# Patient Record
Sex: MALE | Race: WHITE
[De-identification: names, ages, dates, MRNs, and addresses within clinical notes are randomized per-mention and may not be internally consistent; named-entity substitution may affect disease eponyms.]

---

## 2018-02-13 ENCOUNTER — HOSPITAL ENCOUNTER (OUTPATIENT)
Dept: HOSPITAL 47 - LABWHC1 | Age: 58
Discharge: HOME | End: 2018-02-13
Payer: COMMERCIAL

## 2018-02-13 ENCOUNTER — HOSPITAL ENCOUNTER (INPATIENT)
Dept: HOSPITAL 47 - EC | Age: 58
LOS: 3 days | Discharge: HOME | DRG: 378 | End: 2018-02-16
Payer: COMMERCIAL

## 2018-02-13 DIAGNOSIS — K62.5: Primary | ICD-10-CM

## 2018-02-13 DIAGNOSIS — K57.31: ICD-10-CM

## 2018-02-13 DIAGNOSIS — I25.2: ICD-10-CM

## 2018-02-13 DIAGNOSIS — D62: ICD-10-CM

## 2018-02-13 DIAGNOSIS — Z87.11: ICD-10-CM

## 2018-02-13 DIAGNOSIS — Z79.82: ICD-10-CM

## 2018-02-13 DIAGNOSIS — K92.2: Primary | ICD-10-CM

## 2018-02-13 DIAGNOSIS — F10.20: ICD-10-CM

## 2018-02-13 DIAGNOSIS — Z85.828: ICD-10-CM

## 2018-02-13 DIAGNOSIS — Z79.02: ICD-10-CM

## 2018-02-13 DIAGNOSIS — I25.10: ICD-10-CM

## 2018-02-13 DIAGNOSIS — Z87.891: ICD-10-CM

## 2018-02-13 DIAGNOSIS — Z87.442: ICD-10-CM

## 2018-02-13 DIAGNOSIS — D75.89: ICD-10-CM

## 2018-02-13 DIAGNOSIS — Z95.5: ICD-10-CM

## 2018-02-13 DIAGNOSIS — E78.5: ICD-10-CM

## 2018-02-13 DIAGNOSIS — K21.0: ICD-10-CM

## 2018-02-13 DIAGNOSIS — Z79.899: ICD-10-CM

## 2018-02-13 LAB
ALBUMIN SERPL-MCNC: 3.4 G/DL (ref 3.5–5)
ALP SERPL-CCNC: 58 U/L (ref 38–126)
ALT SERPL-CCNC: 32 U/L (ref 21–72)
ANION GAP SERPL CALC-SCNC: 10 MMOL/L
APTT BLD: 20.5 SEC (ref 22–30)
AST SERPL-CCNC: 25 U/L (ref 17–59)
BASOPHILS # BLD AUTO: 0.1 K/UL (ref 0–0.2)
BASOPHILS # BLD AUTO: 0.1 K/UL (ref 0–0.2)
BASOPHILS NFR BLD AUTO: 1 %
BASOPHILS NFR BLD AUTO: 1 %
BUN SERPL-SCNC: 13 MG/DL (ref 9–20)
CALCIUM SPEC-MCNC: 9.2 MG/DL (ref 8.4–10.2)
CHLORIDE SERPL-SCNC: 103 MMOL/L (ref 98–107)
CK SERPL-CCNC: 59 U/L (ref 55–170)
CO2 SERPL-SCNC: 25 MMOL/L (ref 22–30)
EOSINOPHIL # BLD AUTO: 0.2 K/UL (ref 0–0.7)
EOSINOPHIL # BLD AUTO: 0.3 K/UL (ref 0–0.7)
EOSINOPHIL NFR BLD AUTO: 2 %
EOSINOPHIL NFR BLD AUTO: 3 %
ERYTHROCYTE [DISTWIDTH] IN BLOOD BY AUTOMATED COUNT: 2.7 M/UL (ref 4.3–5.9)
ERYTHROCYTE [DISTWIDTH] IN BLOOD BY AUTOMATED COUNT: 2.75 M/UL (ref 4.3–5.9)
ERYTHROCYTE [DISTWIDTH] IN BLOOD: 13.2 % (ref 11.5–15.5)
ERYTHROCYTE [DISTWIDTH] IN BLOOD: 13.3 % (ref 11.5–15.5)
GLUCOSE BLD-MCNC: 114 MG/DL (ref 75–99)
GLUCOSE SERPL-MCNC: 126 MG/DL (ref 74–99)
HCT VFR BLD AUTO: 26.3 % (ref 39–53)
HCT VFR BLD AUTO: 26.9 % (ref 39–53)
HGB BLD-MCNC: 8.4 GM/DL (ref 13–17.5)
HGB BLD-MCNC: 8.5 GM/DL (ref 13–17.5)
INR PPP: 1 (ref ?–1.2)
LIPASE SERPL-CCNC: 95 U/L (ref 23–300)
LYMPHOCYTES # SPEC AUTO: 1.2 K/UL (ref 1–4.8)
LYMPHOCYTES # SPEC AUTO: 1.9 K/UL (ref 1–4.8)
LYMPHOCYTES NFR SPEC AUTO: 18 %
LYMPHOCYTES NFR SPEC AUTO: 21 %
MAGNESIUM SPEC-SCNC: 1.9 MG/DL (ref 1.6–2.3)
MCH RBC QN AUTO: 31 PG (ref 25–35)
MCH RBC QN AUTO: 31.1 PG (ref 25–35)
MCHC RBC AUTO-ENTMCNC: 31.7 G/DL (ref 31–37)
MCHC RBC AUTO-ENTMCNC: 32 G/DL (ref 31–37)
MCV RBC AUTO: 97.4 FL (ref 80–100)
MCV RBC AUTO: 98 FL (ref 80–100)
MONOCYTES # BLD AUTO: 0.4 K/UL (ref 0–1)
MONOCYTES # BLD AUTO: 0.6 K/UL (ref 0–1)
MONOCYTES NFR BLD AUTO: 6 %
MONOCYTES NFR BLD AUTO: 6 %
NEUTROPHILS # BLD AUTO: 4.7 K/UL (ref 1.3–7.7)
NEUTROPHILS # BLD AUTO: 5.8 K/UL (ref 1.3–7.7)
NEUTROPHILS NFR BLD AUTO: 66 %
NEUTROPHILS NFR BLD AUTO: 70 %
PLATELET # BLD AUTO: 350 K/UL (ref 150–450)
PLATELET # BLD AUTO: 380 K/UL (ref 150–450)
POTASSIUM SERPL-SCNC: 4.3 MMOL/L (ref 3.5–5.1)
PROT SERPL-MCNC: 6.2 G/DL (ref 6.3–8.2)
PT BLD: 9.8 SEC (ref 9–12)
SODIUM SERPL-SCNC: 138 MMOL/L (ref 137–145)
TROPONIN I SERPL-MCNC: <0.012 NG/ML (ref 0–0.03)
WBC # BLD AUTO: 6.7 K/UL (ref 3.8–10.6)
WBC # BLD AUTO: 8.8 K/UL (ref 3.8–10.6)

## 2018-02-13 PROCEDURE — 85025 COMPLETE CBC W/AUTO DIFF WBC: CPT

## 2018-02-13 PROCEDURE — 82550 ASSAY OF CK (CPK): CPT

## 2018-02-13 PROCEDURE — 86920 COMPATIBILITY TEST SPIN: CPT

## 2018-02-13 PROCEDURE — 84100 ASSAY OF PHOSPHORUS: CPT

## 2018-02-13 PROCEDURE — 84132 ASSAY OF SERUM POTASSIUM: CPT

## 2018-02-13 PROCEDURE — 85610 PROTHROMBIN TIME: CPT

## 2018-02-13 PROCEDURE — 85027 COMPLETE CBC AUTOMATED: CPT

## 2018-02-13 PROCEDURE — 96361 HYDRATE IV INFUSION ADD-ON: CPT

## 2018-02-13 PROCEDURE — 86901 BLOOD TYPING SEROLOGIC RH(D): CPT

## 2018-02-13 PROCEDURE — 85730 THROMBOPLASTIN TIME PARTIAL: CPT

## 2018-02-13 PROCEDURE — 43235 EGD DIAGNOSTIC BRUSH WASH: CPT

## 2018-02-13 PROCEDURE — 83735 ASSAY OF MAGNESIUM: CPT

## 2018-02-13 PROCEDURE — 93005 ELECTROCARDIOGRAM TRACING: CPT

## 2018-02-13 PROCEDURE — 86900 BLOOD TYPING SEROLOGIC ABO: CPT

## 2018-02-13 PROCEDURE — 36415 COLL VENOUS BLD VENIPUNCTURE: CPT

## 2018-02-13 PROCEDURE — 80048 BASIC METABOLIC PNL TOTAL CA: CPT

## 2018-02-13 PROCEDURE — 86850 RBC ANTIBODY SCREEN: CPT

## 2018-02-13 PROCEDURE — 96374 THER/PROPH/DIAG INJ IV PUSH: CPT

## 2018-02-13 PROCEDURE — 82553 CREATINE MB FRACTION: CPT

## 2018-02-13 PROCEDURE — 80053 COMPREHEN METABOLIC PANEL: CPT

## 2018-02-13 PROCEDURE — 45330 DIAGNOSTIC SIGMOIDOSCOPY: CPT

## 2018-02-13 PROCEDURE — 99285 EMERGENCY DEPT VISIT HI MDM: CPT

## 2018-02-13 PROCEDURE — 83690 ASSAY OF LIPASE: CPT

## 2018-02-13 PROCEDURE — 84484 ASSAY OF TROPONIN QUANT: CPT

## 2018-02-13 RX ADMIN — PANTOPRAZOLE SODIUM SCH MG: 40 INJECTION, POWDER, FOR SOLUTION INTRAVENOUS at 21:47

## 2018-02-13 RX ADMIN — CEFAZOLIN SCH MLS/HR: 330 INJECTION, POWDER, FOR SOLUTION INTRAMUSCULAR; INTRAVENOUS at 22:30

## 2018-02-13 NOTE — ED
General Adult HPI





- General


Chief complaint: GI Bleed


Stated complaint: Rctal bleeding


Time Seen by Provider: 02/13/18 10:21


Source: patient, RN notes reviewed, old records reviewed


Mode of arrival: ambulatory


Limitations: no limitations





- History of Present Illness


Initial comments: 





This is a 57-year-old male the ER for evaluation of GI bleed.  Patient sent in 

for abnormal outpatient lab test.  Patient did have recent heart attack, and 

Plavix.  Patient has no lightheadedness no dizziness no weakness to feels 

easily pass out no abdominal pain.  Patient's been having bloody bowel 

movements for 4 days.  Bloody bowel movement or bright red in nature 

occasionally a little dark.





- Related Data


 Home Medications











 Medication  Instructions  Recorded  Confirmed


 


Aspirin 81 mg PO QAM 10/23/17 02/13/18


 


Cholecalciferol (Vitamin D3) 2,000 unit PO QAM 10/23/17 02/13/18





[Vitamin D3]   


 


Atorvastatin [Lipitor] 80 mg PO QAM 12/08/17 02/13/18


 


Metoprolol Tartrate [Lopressor] 25 mg PO QAM 12/08/17 02/13/18


 


Clopidogrel [Plavix] 75 mg PO QAM 02/13/18 02/13/18








 Previous Rx's











 Medication  Instructions  Recorded


 


Nitroglycerin Sl Tabs [Nitrostat] 0.4 mg SUBLINGUAL Q5M PRN #25 tab 10/25/17











 Allergies











Allergy/AdvReac Type Severity Reaction Status Date / Time


 


No Known Allergies Allergy   Verified 02/13/18 10:36














Review of Systems


ROS Statement: 


Those systems with pertinent positive or pertinent negative responses have been 

documented in the HPI.





ROS Other: All systems not noted in ROS Statement are negative.





Past Medical History


Past Medical History: Cancer, Myocardial Infarction (MI)


Additional Past Medical History / Comment(s): skin cancer right eyebrow


Last Myocardial Infarction Date:: 10/23/17


History of Any Multi-Drug Resistant Organisms: None Reported


Past Surgical History: Heart Catheterization With Stent, Tonsillectomy


Additional Past Surgical History / Comment(s): skin cancer removal, 3 stents  10

/23/17


Past Anesthesia/Blood Transfusion Reactions: No Reported Reaction


Date of Last Stent Placement:: 10/23/17


Past Psychological History: No Psychological Hx Reported


Smoking Status: Former smoker


Past Alcohol Use History: Daily


Past Drug Use History: None Reported





- Past Family History


  ** Mother


Family Medical History: CVA/TIA





  ** Father


Family Medical History: Cancer


Additional Family Medical History / Comment(s): pancreatic





General Exam


Limitations: no limitations


General appearance: alert, in no apparent distress


Head exam: Present: atraumatic, normocephalic, normal inspection


Eye exam: Present: normal appearance, PERRL, EOMI.  Absent: scleral icterus, 

conjunctival injection, periorbital swelling


ENT exam: Present: normal exam, mucous membranes moist


Neck exam: Present: normal inspection.  Absent: tenderness, meningismus, 

lymphadenopathy


Respiratory exam: Present: normal lung sounds bilaterally.  Absent: respiratory 

distress, wheezes, rales, rhonchi, stridor


Cardiovascular Exam: Present: normal rhythm, tachycardia, normal heart sounds.  

Absent: systolic murmur, diastolic murmur, rubs, gallop, clicks


GI/Abdominal exam: Present: soft, normal bowel sounds.  Absent: distended, 

tenderness, guarding, rebound, rigid


Extremities exam: Present: normal inspection, full ROM, normal capillary 

refill.  Absent: tenderness, pedal edema, joint swelling, calf tenderness


Back exam: Present: normal inspection


Neurological exam: Present: alert, oriented X3, CN II-XII intact


Psychiatric exam: Present: normal affect, normal mood


Skin exam: Present: warm, dry, intact, normal color.  Absent: rash





Course


 Vital Signs











  02/13/18 02/13/18





  10:12 11:25


 


Temperature 98.8 F 


 


Pulse Rate 114 H 89


 


Respiratory 20 16





Rate  


 


Blood Pressure 111/72 136/73


 


O2 Sat by Pulse 100 100





Oximetry  














- Reevaluation(s)


Reevaluation #1: 





02/13/18 11:58


Patient has not had a significant bloody bowel movement here in the emergency 

room





Medical Decision Making





- Medical Decision Making





57 male the ER for evaluation of positive GI bleed.  3-4 days of bloody bowel 

movements.  Patient denies pain.  Patient's hemoglobin is drop from 14 down to 

8.  Patient will be admitted for continued evaluation of hemodynamic state





- Lab Data


Result diagrams: 


 02/13/18 10:50





 02/13/18 10:50


 Lab Results











  02/13/18 02/13/18 02/13/18 Range/Units





  10:50 10:50 10:50 


 


WBC    6.7  (3.8-10.6)  k/uL


 


RBC    2.70 L  (4.30-5.90)  m/uL


 


Hgb    8.4 L  (13.0-17.5)  gm/dL


 


Hct    26.3 L  (39.0-53.0)  %


 


MCV    97.4  (80.0-100.0)  fL


 


MCH    31.1  (25.0-35.0)  pg


 


MCHC    32.0  (31.0-37.0)  g/dL


 


RDW    13.3  (11.5-15.5)  %


 


Plt Count    350  (150-450)  k/uL


 


Neutrophils %    70  %


 


Lymphocytes %    18  %


 


Monocytes %    6  %


 


Eosinophils %    2  %


 


Basophils %    1  %


 


Neutrophils #    4.7  (1.3-7.7)  k/uL


 


Lymphocytes #    1.2  (1.0-4.8)  k/uL


 


Monocytes #    0.4  (0-1.0)  k/uL


 


Eosinophils #    0.2  (0-0.7)  k/uL


 


Basophils #    0.1  (0-0.2)  k/uL


 


PT  9.8    (9.0-12.0)  sec


 


INR  1.0    (<1.2)  


 


APTT  20.5 L    (22.0-30.0)  sec


 


Sodium     (137-145)  mmol/L


 


Potassium     (3.5-5.1)  mmol/L


 


Chloride     ()  mmol/L


 


Carbon Dioxide     (22-30)  mmol/L


 


Anion Gap     mmol/L


 


BUN     (9-20)  mg/dL


 


Creatinine     (0.66-1.25)  mg/dL


 


Est GFR (MDRD) Af Amer     (>60 ml/min/1.73 sqM)  


 


Est GFR (MDRD) Non-Af     (>60 ml/min/1.73 sqM)  


 


Glucose     (74-99)  mg/dL


 


Calcium     (8.4-10.2)  mg/dL


 


Magnesium     (1.6-2.3)  mg/dL


 


Total Bilirubin     (0.2-1.3)  mg/dL


 


AST     (17-59)  U/L


 


ALT     (21-72)  U/L


 


Alkaline Phosphatase     ()  U/L


 


Total Creatine Kinase   59   ()  U/L


 


CK-MB (CK-2)   0.4   (0.0-2.4)  ng/mL


 


CK-MB (CK-2) Rel Index   0.7   


 


Troponin I   <0.012   (0.000-0.034)  ng/mL


 


Total Protein     (6.3-8.2)  g/dL


 


Albumin     (3.5-5.0)  g/dL


 


Lipase     ()  U/L














  02/13/18 Range/Units





  10:50 


 


WBC   (3.8-10.6)  k/uL


 


RBC   (4.30-5.90)  m/uL


 


Hgb   (13.0-17.5)  gm/dL


 


Hct   (39.0-53.0)  %


 


MCV   (80.0-100.0)  fL


 


MCH   (25.0-35.0)  pg


 


MCHC   (31.0-37.0)  g/dL


 


RDW   (11.5-15.5)  %


 


Plt Count   (150-450)  k/uL


 


Neutrophils %   %


 


Lymphocytes %   %


 


Monocytes %   %


 


Eosinophils %   %


 


Basophils %   %


 


Neutrophils #   (1.3-7.7)  k/uL


 


Lymphocytes #   (1.0-4.8)  k/uL


 


Monocytes #   (0-1.0)  k/uL


 


Eosinophils #   (0-0.7)  k/uL


 


Basophils #   (0-0.2)  k/uL


 


PT   (9.0-12.0)  sec


 


INR   (<1.2)  


 


APTT   (22.0-30.0)  sec


 


Sodium  138  (137-145)  mmol/L


 


Potassium  4.3  (3.5-5.1)  mmol/L


 


Chloride  103  ()  mmol/L


 


Carbon Dioxide  25  (22-30)  mmol/L


 


Anion Gap  10  mmol/L


 


BUN  13  (9-20)  mg/dL


 


Creatinine  0.98  (0.66-1.25)  mg/dL


 


Est GFR (MDRD) Af Amer  >60  (>60 ml/min/1.73 sqM)  


 


Est GFR (MDRD) Non-Af  >60  (>60 ml/min/1.73 sqM)  


 


Glucose  126 H  (74-99)  mg/dL


 


Calcium  9.2  (8.4-10.2)  mg/dL


 


Magnesium  1.9  (1.6-2.3)  mg/dL


 


Total Bilirubin  0.3  (0.2-1.3)  mg/dL


 


AST  25  (17-59)  U/L


 


ALT  32  (21-72)  U/L


 


Alkaline Phosphatase  58  ()  U/L


 


Total Creatine Kinase   ()  U/L


 


CK-MB (CK-2)   (0.0-2.4)  ng/mL


 


CK-MB (CK-2) Rel Index   


 


Troponin I   (0.000-0.034)  ng/mL


 


Total Protein  6.2 L  (6.3-8.2)  g/dL


 


Albumin  3.4 L  (3.5-5.0)  g/dL


 


Lipase  95  ()  U/L














Disposition


Clinical Impression: 


 Gastrointestinal hemorrhage, Anemia





Disposition: ADMITTED AS IP TO THIS HOSP


Condition: Fair


Referrals: 


Reddy Chung MD [Primary Care Provider] - 1-2 days

## 2018-02-13 NOTE — P.CNPUL
History of Present Illness


Consult date: 02/13/18


Requesting physician: Darrius Martínez


Reason for consult: other (Critical care management)


Chief complaint: Bright red and dark tarry stools


History of present illness: 





This is a very pleasant 57-year-old gentleman who follows with Dr. Reddy Chung 

as his primary care physician.  He has a history of skin cancer with resection 

over the right eyebrow, nephrolithiasis, hyperlipidemia.  He has a remote 

history of smoking and daily alcohol consumption.  He has a history of coronary 

artery disease and had sustained an acute myocardial infarction in October 2017.  He had undergone cardiac catheterization and stenting 3 to the RCA.  

There was moderate disease in the circumflex at that time along with some 

disease in the LAD.  He had undergone a subsequent cardiac catheterization on 12 /12/2017 to evaluate the progression.  The stents were widely patent.  The LAD 

artery had diffuse distal disease in the distal one fourth.  Circumcised had a 

50% mid lesion.  He was recommended continued medical therapy.  He has remained 

on Plavix and aspirin since October.  He presented here to the emergency room 

from his primary care physician's office after having a 3 day history of bloody 

bowel movements.  Initially they were bright red and subsequently they're black 

and tarry.  He was found to have a drop in hemoglobin from 14-8.4.  The 

recommendation is for transfer to the intensive care unit where consulted for 

the same.  He is seen in the emergency room.  He is awake and alert in no acute 

distress.  He has not had any further bloody bowel movements today.  He is 

maintaining good O2 saturations up to 100% on room air.  He's been afebrile.  

Hemodynamically stable.  He has been initiated on Protonix 40 mg IV push twice 

a day.  0.9 normal saline at 100 MLS per hour following 1 L of fluid 

resuscitation.





Review of Systems





14 point review of system was conducted.  Positive for acute gastrointestinal 

bleeding.  All other systems are negative thus far.





Past Medical History


Past Medical History: Coronary Artery Disease (CAD), Cancer, Chest Pain / Angina

, Myocardial Infarction (MI)


Additional Past Medical History / Comment(s): 10/23/17 stemi,  skin cancer 

removal-right eyebrow, kidney stone which pt passed on his own.


Last Myocardial Infarction Date:: 10/23/17


History of Any Multi-Drug Resistant Organisms: None Reported


Past Surgical History: Heart Catheterization With Stent, Tonsillectomy


Additional Past Surgical History / Comment(s): skin cancer removal, 3 stents on 

10/23/17, 12/12/17 cardiac cath with maximizing medical therapy.


Past Anesthesia/Blood Transfusion Reactions: No Reported Reaction


Date of Last Stent Placement:: 10/23/17


Smoking Status: Former smoker





- Past Family History


  ** Mother


Family Medical History: CVA/TIA





  ** Father


Family Medical History: Cancer


Additional Family Medical History / Comment(s): pancreatic





Medications and Allergies


 Home Medications











 Medication  Instructions  Recorded  Confirmed  Type


 


Aspirin 81 mg PO QAM 10/23/17 02/13/18 History


 


Cholecalciferol (Vitamin D3) 2,000 unit PO QAM 10/23/17 02/13/18 History





[Vitamin D3]    


 


Nitroglycerin Sl Tabs [Nitrostat] 0.4 mg SUBLINGUAL Q5M PRN #25 tab 10/25/17 02/

13/18 Rx


 


Atorvastatin [Lipitor] 80 mg PO QAM 12/08/17 02/13/18 History


 


Metoprolol Tartrate [Lopressor] 25 mg PO QAM 12/08/17 02/13/18 History


 


Clopidogrel [Plavix] 75 mg PO QAM 02/13/18 02/13/18 History











 Allergies











Allergy/AdvReac Type Severity Reaction Status Date / Time


 


No Known Allergies Allergy   Verified 02/13/18 10:36














Physical Exam


Vitals: 


 Vital Signs











  Temp Pulse Resp BP Pulse Ox


 


 02/13/18 11:25   89  16  136/73  100


 


 02/13/18 10:12  98.8 F  114 H  20  111/72  100








 Intake and Output











 02/12/18 02/13/18 02/13/18





 22:59 06:59 14:59


 


Other:   


 


  Weight   76.657 kg








 Patient Weight











 02/14/18





 06:59


 


Weight 76.657 kg














GENERAL EXAM: Alert, active, comfortable in no apparent distress.


HEAD: Normocephalic.


EYES: Normal reaction of pupils, equal size.


NOSE: Clear with pink turbinates.


THROAT: No erythema or exudates.


NECK: No masses, no JVD.


CHEST: No chest wall deformity.


LUNGS: Equal air entry with no crackles, wheeze, rhonchi or dullness.


CVS: S1 and S2 normal with no audible murmur, regular rhythm.


ABDOMEN: No hepatosplenomegaly, normal bowel sounds, no guarding or rigidity.


SPINE: No scoliosis or deformity


SKIN: No rashes


CENTRAL NERVOUS SYSTEM: No focal deficits, tone is normal in all 4 extremities.


EXTREMITIES: There is no peripheral edema.  No clubbing, no cyanosis.  

Peripheral pulses are intact.





Results





- Laboratory Findings


CBC and BMP: 


 02/13/18 10:50





 02/13/18 10:50


PT/INR, D-dimer











PT  9.8 sec (9.0-12.0)   02/13/18  10:50    


 


INR  1.0  (<1.2)   02/13/18  10:50    








Abnormal lab findings: 


 Abnormal Labs











  02/13/18 02/13/18 02/13/18





  10:50 10:50 10:50


 


RBC   2.70 L 


 


Hgb   8.4 L 


 


Hct   26.3 L 


 


APTT  20.5 L  


 


Glucose    126 H


 


Total Protein    6.2 L


 


Albumin    3.4 L














Assessment and Plan


Assessment: 





Impression:





#1 Acute gastrointestinal bleeding with a drop of hemoglobin from 14-8.7.





#2 Coronary artery disease with myocardial infarction in October 2017 status 

post stent placements 3 to the RCA. Maintained on aspirin and Plavix.  

Subsequent cardiac catheterization in December 2017 revealed patent stents.





#3 Daily alcohol consumption.





#4 Remote history of chronic tobacco dependence.





#5 History of nephrolithiasis.





#6 History of skin cancer removed from the right eyebrow.





Plan:





The patient was seen and evaluated by Dr. Bhatt.  He will be transferred to the 

intensive care unit for close observation and continued hemoglobin monitoring.  

The patient has not had any active bleeding today.  His stents are less than 6 

months out.  We'll continue with Plavix and aspirin for now.  Continue 0.9 

normal saline at 100 MLS per hour.  Continue IV Protonix.  Consult 

gastroenterology.  We will continue to follow and make further recommendations 

based on his clinical status.





I, the cosigning physician, performed a history & physical examination of the 

patient. Lungs sounds are clear.  Maintaining good O2 saturations in the 90s on 

room air.  I discussed the assessment and plan of care with my nurse 

practitioner, Fang Soto. I attest to the above note as dictated by her. 


Time with Patient: Greater than 30

## 2018-02-13 NOTE — HP
HISTORY AND PHYSICAL



DATE OF ADMISSION:

02/13/2018



PRESENT COMPLAINT:

Lower GI bleed.



HISTORY OF PRESENTING COMPLAINT:

This is a pleasant 57-year-old patient of Dr. Chung.  The patient on October 23 did

have an ST-elevation myocardial infarction, had a stent placed to the RCA.  Then

ejection fraction was 40%.  The patient came back to the cath lab in December, by Dr. KIKA HUBBARD. Juvenal was found to have diffuse LAD and no intervention was done.  The decision

was made to do medical management.  Repeat echocardiogram showed an EF of 50%-55%.  The

patient has been on aspirin and Plavix.  Four days ago, patient had a bout of diarrhea

and then started having multiple bloody stools for the last 3 days.  Last bloody stool

was yesterday and patient has been feeling dizzy, tired, run down.  Hemoglobin in the

ER was found to be 8.4, dizzy, lightheaded.  Patient admitted for admitted for the

same.  No bleeding today.



REVIEW OF SYSTEMS:

CONSTITUTIONAL:  Tired.

HEENT:  None.

RESPIRATORY:  None.

CARDIOVASCULAR:  None.

GASTROINTESTINAL:  As above.

GENITOURINARY:  None.

MUSCULOSKELETAL:  None.

DERMATOLOGICAL:  None.

HEMATOLOGIC:  As above.

LYMPHATIC:  None.

PSYCHIATRY:  None.

NEUROLOGICAL:  None.



PAST MEDICAL HISTORY:

Kidney stones, skin cancer, _____, coronary artery disease with acute MI.



SURGICAL HISTORY:

Skin cancer removal.



SOCIAL HISTORY:

Patient smoked for 30 years, 2 packs a day.  Still drinks about 5-6 beers a day.

Retired .  Lives with his wife, Sivan.



Family history of stroke.



HOME MEDICATIONS:

1. Nitrostat 0.4 sublingual q.5 p.r.n.

2. Lopressor 25 p.o. daily.

3. Plavix 75 p.o. daily.

4. Vitamin D3 2000 p.o. daily.

5. Lipitor 80 mg p.o. daily.

6. Aspirin 81 mg p.o. daily.



ALLERGIES:

None.



EXAMINATION:

Temperature 98.4, pulse 79, respirations 14, blood pressure 110/64, pulse ox 99% on

room air.

GENERAL APPEARANCE:  Average build, lying in bed, tired-appearing.

EYES:  Pupils equal.  Conjunctivae pale.

HEENT:  Oral cavity normal.

NECK:  JVD not raised.  Mass not palpable.

RESPIRATORY:  Effort normal.

LUNGS:  Slightly decreased breath sounds.

CARDIOVASCULAR:  First and second sounds normal.  No edema.

ABDOMEN:  Soft, nontender.  Liver and spleen not palpable.

LYMPHATIC:  No lymph nodes palpable in neck or axillae.

PSYCHIATRY:  Alert and oriented x3.  Mood and affect normal.

NEUROLOGICAL:  Pupils equal.  Cranial nerves grossly intact.  Power and sensation

grossly intact.



INVESTIGATIONS:

White count 6.7, hemoglobin 8.4, down from 14.4 on 10/25/2017.  BUN, creatinine are

normal.



ASSESSMENT:

1. Acute severe gastrointestinal in a patient who is on aspirin, Plavix, also drinks

    about 6 beers a day.  Most likely source of bleeding is upper

    gastrointestinal/gastric.

2. Acute severe blood loss anemia, symptomatic.  Patient is light-headed with a

    hemoglobin from 14.4 down to 8.4.

3. Coronary artery disease with stent to the right coronary artery and diffuse disease

    in the left anterior descending.



PLAN:

Patient admitted to the ICU.  Antiplatelet agents are held, even though the patient has

had a recent stent.  Will let Cardiology decide this dilemma.  Consultation made to GI,

pulmonary and intensivist.  H and H is being followed.  The patient is on a clear

liquid diet.  Patient is also put on proton pump inhibitor in IV format.  Care was

discussed with the patient.





MMODL / IJN: [id number] / Job#: [id number]

## 2018-02-14 LAB
ANION GAP SERPL CALC-SCNC: 7 MMOL/L
BASOPHILS # BLD AUTO: 0.1 K/UL (ref 0–0.2)
BASOPHILS NFR BLD AUTO: 1 %
BUN SERPL-SCNC: 8 MG/DL (ref 9–20)
CALCIUM SPEC-MCNC: 8.4 MG/DL (ref 8.4–10.2)
CHLORIDE SERPL-SCNC: 108 MMOL/L (ref 98–107)
CO2 SERPL-SCNC: 23 MMOL/L (ref 22–30)
EOSINOPHIL # BLD AUTO: 0.2 K/UL (ref 0–0.7)
EOSINOPHIL NFR BLD AUTO: 4 %
ERYTHROCYTE [DISTWIDTH] IN BLOOD BY AUTOMATED COUNT: 2.26 M/UL (ref 4.3–5.9)
ERYTHROCYTE [DISTWIDTH] IN BLOOD BY AUTOMATED COUNT: 2.27 M/UL (ref 4.3–5.9)
ERYTHROCYTE [DISTWIDTH] IN BLOOD BY AUTOMATED COUNT: 2.32 M/UL (ref 4.3–5.9)
ERYTHROCYTE [DISTWIDTH] IN BLOOD BY AUTOMATED COUNT: 2.34 M/UL (ref 4.3–5.9)
ERYTHROCYTE [DISTWIDTH] IN BLOOD: 13.5 % (ref 11.5–15.5)
ERYTHROCYTE [DISTWIDTH] IN BLOOD: 13.8 % (ref 11.5–15.5)
ERYTHROCYTE [DISTWIDTH] IN BLOOD: 13.9 % (ref 11.5–15.5)
ERYTHROCYTE [DISTWIDTH] IN BLOOD: 14.3 % (ref 11.5–15.5)
GLUCOSE SERPL-MCNC: 92 MG/DL (ref 74–99)
HCT VFR BLD AUTO: 22.3 % (ref 39–53)
HCT VFR BLD AUTO: 23 % (ref 39–53)
HCT VFR BLD AUTO: 23.1 % (ref 39–53)
HCT VFR BLD AUTO: 24 % (ref 39–53)
HGB BLD-MCNC: 7 GM/DL (ref 13–17.5)
HGB BLD-MCNC: 7.1 GM/DL (ref 13–17.5)
HGB BLD-MCNC: 7.2 GM/DL (ref 13–17.5)
HGB BLD-MCNC: 7.4 GM/DL (ref 13–17.5)
LYMPHOCYTES # SPEC AUTO: 1.3 K/UL (ref 1–4.8)
LYMPHOCYTES NFR SPEC AUTO: 25 %
MAGNESIUM SPEC-SCNC: 1.9 MG/DL (ref 1.6–2.3)
MAGNESIUM SPEC-SCNC: 2.2 MG/DL (ref 1.6–2.3)
MCH RBC QN AUTO: 30.8 PG (ref 25–35)
MCH RBC QN AUTO: 31.1 PG (ref 25–35)
MCH RBC QN AUTO: 31.1 PG (ref 25–35)
MCH RBC QN AUTO: 31.5 PG (ref 25–35)
MCHC RBC AUTO-ENTMCNC: 30.2 G/DL (ref 31–37)
MCHC RBC AUTO-ENTMCNC: 30.8 G/DL (ref 31–37)
MCHC RBC AUTO-ENTMCNC: 31.4 G/DL (ref 31–37)
MCHC RBC AUTO-ENTMCNC: 31.7 G/DL (ref 31–37)
MCV RBC AUTO: 102 FL (ref 80–100)
MCV RBC AUTO: 102.6 FL (ref 80–100)
MCV RBC AUTO: 98.2 FL (ref 80–100)
MCV RBC AUTO: 99.1 FL (ref 80–100)
MONOCYTES # BLD AUTO: 0.4 K/UL (ref 0–1)
MONOCYTES NFR BLD AUTO: 8 %
NEUTROPHILS # BLD AUTO: 3 K/UL (ref 1.3–7.7)
NEUTROPHILS NFR BLD AUTO: 60 %
PLATELET # BLD AUTO: 344 K/UL (ref 150–450)
PLATELET # BLD AUTO: 348 K/UL (ref 150–450)
PLATELET # BLD AUTO: 364 K/UL (ref 150–450)
PLATELET # BLD AUTO: 400 K/UL (ref 150–450)
POTASSIUM SERPL-SCNC: 3.7 MMOL/L (ref 3.5–5.1)
POTASSIUM SERPL-SCNC: 4 MMOL/L (ref 3.5–5.1)
SODIUM SERPL-SCNC: 138 MMOL/L (ref 137–145)
WBC # BLD AUTO: 5 K/UL (ref 3.8–10.6)
WBC # BLD AUTO: 5.4 K/UL (ref 3.8–10.6)
WBC # BLD AUTO: 5.6 K/UL (ref 3.8–10.6)
WBC # BLD AUTO: 6.6 K/UL (ref 3.8–10.6)

## 2018-02-14 PROCEDURE — 30233N1 TRANSFUSION OF NONAUTOLOGOUS RED BLOOD CELLS INTO PERIPHERAL VEIN, PERCUTANEOUS APPROACH: ICD-10-PCS

## 2018-02-14 RX ADMIN — Medication SCH UNIT: at 11:48

## 2018-02-14 RX ADMIN — MAGNESIUM SULFATE IN DEXTROSE SCH MLS/HR: 10 INJECTION, SOLUTION INTRAVENOUS at 11:09

## 2018-02-14 RX ADMIN — CEFAZOLIN SCH MLS/HR: 330 INJECTION, POWDER, FOR SOLUTION INTRAMUSCULAR; INTRAVENOUS at 18:25

## 2018-02-14 RX ADMIN — METOPROLOL TARTRATE SCH MG: 25 TABLET, FILM COATED ORAL at 08:36

## 2018-02-14 RX ADMIN — PANTOPRAZOLE SODIUM SCH MG: 40 INJECTION, POWDER, FOR SOLUTION INTRAVENOUS at 08:36

## 2018-02-14 RX ADMIN — PANTOPRAZOLE SODIUM SCH MG: 40 INJECTION, POWDER, FOR SOLUTION INTRAVENOUS at 22:34

## 2018-02-14 RX ADMIN — MAGNESIUM SULFATE IN DEXTROSE SCH MLS/HR: 10 INJECTION, SOLUTION INTRAVENOUS at 08:36

## 2018-02-14 RX ADMIN — ATORVASTATIN CALCIUM SCH MG: 80 TABLET, FILM COATED ORAL at 08:36

## 2018-02-14 RX ADMIN — CEFAZOLIN SCH MLS/HR: 330 INJECTION, POWDER, FOR SOLUTION INTRAMUSCULAR; INTRAVENOUS at 17:16

## 2018-02-14 NOTE — PN
PROGRESS NOTE



DATE OF SERVICE:

February 14, 2018.



PRESENTING COMPLAINT:

Lower GI bleed.



INTERVAL HISTORY:

This is a patient with a MI in October of 2017 with stent to the RCA, who drinks about

6 beers a day, was on aspirin and Plavix presented with 4 days of GI bleeding.  No

further bleeding.  No abdominal pain.  The patient did not want GI to do any

interventions.  Hence, surgery Dr. Desai was consulted.  The patient is currently

on clear liquids.  Lying in bed, tired appearing.



Current medications:  Aspirin, Plavix has been held.



PHYSICAL EXAMINATION:

Temperature 98.5, pulse 87, respiratory 15, blood pressure 138/73, pulse ox 99% on room

air.  General appearance:  Lying in bed, awake.  Eyes pupils are equal. Conjunctivae

pale.  HEENT:  External appearance of nose and ears normal.  Oral cavity normal.  Neck

JVD not raised.  Mass not palpable.  Respiratory effort normal.  Lungs slightly

decreased breath sounds.  Cardiovascular:  1st and 2nd sounds normal.  No edema.

ABDOMEN:  Soft, nontender.  Liver and spleen not palpable.  No mass palpable.

Psychiatry:  Alert and oriented times three.  Mood and affect normal.



INVESTIGATIONS:

White count 5, hemoglobin 7, potassium 3.7.



ASSESSMENT:

1. Acute severe gastrointestinal bleed in a patient who is on aspirin, Plavix and also

    drinks 6 beers a day.  Likely source is a gastric.

2. Acute severe blood loss anemia symptomatic with hemoglobin gone down.

3. Coronary artery with stent to the RCA and diffuse disease in the LAD.



PLAN:

Given that the patient is weak, tired, symptomatic,  we will go ahead and transfuse a

unit of blood.  Awaiting input in terms of timing of endoscopy.  Care was discussed

with the patient.  Follow.





MMODL / IJN: [id number] / Job#: [id number]

## 2018-02-14 NOTE — P.CONS
History of Present Illness





- Reason for Consult


Consult date: 02/14/18


GI bleed


Requesting physician: Darrius Martínez





- History of Present Illness


57-year-old male patient of Dr. Chung with a PMH of recent MI PCI stent October 2017 maintained on dual antiplatelet therapy presented with acute painless 

rectal bleeding.  This past Saturday he developed bright red blood per rectum 

multiple times without any pain.  Denies hematemesis or melena.  Over the 

weekend into Monday bowel movements tapered off more darker in color.  No 

history of GI bleed or peptic ulcer disease.  Denies epigastric pain.  No 

history of EGD.  Colonoscopy in 2014 performed by Dr. Delacruz identified sigmoid 

diverticulosis with a few polyps removed.  He drinks 4-6 beers on a daily 

basis.  No NSAIDs.





Admission hemoglobin 8.4 decreased to 7.  .  Platelets 344.  INR 1.0.








Review of Systems


Constitutional: Denies fever, chills, sweats, weight gain, or loss.


HEENT: Negative for migraines, blurred vision or loss, earaches, drainage, 

tinnitus, oral mucosal lesions, dysphagia, or odynophagia.


Cardiac: MI.  Negative for chest pain, arrhythmias, or palpitation.


Respiratory: Negative for shortness of breath, hemoptysis, cough, or sputum 

production.


Gastrointestinal: See HPI for pertinent findings.


Genitourinary: Negative for hematuria, urgency, frequency, polyuria, dysuria, 

or penile discharge.


Musculoskeletal: Negative for muscle aches, swelling, arthritis, and 

arthralgias.  


Neurologic: Negative for stroke or TIA.


Endocrine: Negative for thyroid problems.


Skin: History of skin carcinoma.  Negative for rash or itching.


Psychiatric: Negative history for depression and anxiety








Past Medical History


Past Medical History: Coronary Artery Disease (CAD), Cancer, Chest Pain / Angina

, Myocardial Infarction (MI)


Additional Past Medical History / Comment(s): 10/23/17 stemi,  skin cancer 

removal-right eyebrow, kidney stone which pt passed on his own.


Last Myocardial Infarction Date:: 10/23/17


History of Any Multi-Drug Resistant Organisms: None Reported


Past Surgical History: Heart Catheterization With Stent, Tonsillectomy


Additional Past Surgical History / Comment(s): skin cancer removal, 3 stents on 

10/23/17, 12/12/17 cardiac cath with maximizing medical therapy.


Past Anesthesia/Blood Transfusion Reactions: No Reported Reaction


Date of Last Stent Placement:: 10/23/17


Smoking Status: Former smoker





- Past Family History


  ** Mother


Family Medical History: CVA/TIA





  ** Father


Family Medical History: Cancer


Additional Family Medical History / Comment(s): pancreatic





Medications and Allergies


 Home Medications











 Medication  Instructions  Recorded  Confirmed  Type


 


Aspirin 81 mg PO QAM 10/23/17 02/13/18 History


 


Cholecalciferol (Vitamin D3) 2,000 unit PO QAM 10/23/17 02/13/18 History





[Vitamin D3]    


 


Nitroglycerin Sl Tabs [Nitrostat] 0.4 mg SUBLINGUAL Q5M PRN #25 tab 10/25/17 02/

13/18 Rx


 


Atorvastatin [Lipitor] 80 mg PO QAM 12/08/17 02/13/18 History


 


Metoprolol Tartrate [Lopressor] 25 mg PO QAM 12/08/17 02/13/18 History


 


Clopidogrel [Plavix] 75 mg PO QAM 02/13/18 02/13/18 History











 Allergies











Allergy/AdvReac Type Severity Reaction Status Date / Time


 


No Known Allergies Allergy   Verified 02/13/18 10:36














Physical Exam


Vitals: 


 Vital Signs











  Temp Pulse Resp BP Pulse Ox


 


 02/14/18 08:00  98.5 F  87  15  138/73  99


 


 02/14/18 07:00   84  14  123/72  100


 


 02/14/18 06:00   87  14  135/73  99


 


 02/14/18 05:00   83  13  120/72  99


 


 02/14/18 04:00  98.3 F  79  18  127/70  97


 


 02/14/18 03:00   89  14  110/67  96


 


 02/14/18 02:00   84  16  115/70  97


 


 02/14/18 01:00   76  15  119/68  99


 


 02/14/18 00:02   79  18  124/68  100


 


 02/14/18 00:00  98.5 F  82  16  124/68  100


 


 02/13/18 23:00   86  12  110/64  99


 


 02/13/18 22:00   77  19  125/77  99


 


 02/13/18 21:00  98.4 F  78  14  140/76  100


 


 02/13/18 20:23   69  16   100


 


 02/13/18 20:04   78  18  170/81  100


 


 02/13/18 19:17   76  18  139/76  99


 


 02/13/18 17:48   70  16  165/75  100


 


 02/13/18 16:50   72  16  142/75  100


 


 02/13/18 14:37   73  16  145/81  100


 


 02/13/18 13:37   78  16  146/78  100


 


 02/13/18 12:37   88  16  132/84  100


 


 02/13/18 11:37   90  16  128/71  98


 


 02/13/18 11:25   89  16  136/73  100








 Intake and Output











 02/13/18 02/14/18 02/14/18





 22:59 06:59 14:59


 


Intake Total 200 800 450


 


Output Total 250 475 450


 


Balance -50 325 0


 


Intake:   


 


  Intake, IV Titration 200 800 200





  Amount   


 


    Sodium Chloride 0.9% 1,  800 200





    000 ml @ 100 mls/hr IV .   





    Q10H ISA Rx#:[id number]   


 


    Sodium Chloride 0.9% 1, 200  





    000 ml @ 100 mls/hr IV .   





    Q10H STA Rx#:[id number]   


 


  Oral   250


 


Output:   


 


  Urine 250 475 450


 


Other:   


 


  Voiding Method Urinal Urinal 


 


  # Voids 1 1 


 


  Weight  82.4 kg 











General appearance: The patient is alert, oriented, in no acute distress.


HET: Head is normocephalic and atraumatic.  Pupils are equal and reactive.  

Oropharynx is clear without lesions.


Neck: Supple without lymphadenopathy.  Trachea midline.


Heart: S1 S2.  Regular rate and rhythm.


Lungs: No crackles or wheezes are heard.


Abdomen: Soft, nontender, nondistended with  bowel sounds.  No peritoneal 

signs.  No palpable organomegaly or masses.


Extremities: Normal skin color and turgor.  No cyanosis, rash, ulceration, 

clubbing, or edema.  Radial and pedal pulses are 2/4 bilaterally.


Neurological: No focal deficits.  Strength and sensation are grossly intact.








Results


CBC & Chem 7: 


 02/14/18 05:51





 02/14/18 05:51


Labs: 


 Abnormal Lab Results - Last 24 Hours (Table)











  02/13/18 02/13/18 02/13/18 Range/Units





  10:50 10:50 10:50 


 


RBC   2.70 L   (4.30-5.90)  m/uL


 


Hgb   8.4 L   (13.0-17.5)  gm/dL


 


Hct   26.3 L   (39.0-53.0)  %


 


MCV     (80.0-100.0)  fL


 


MCHC     (31.0-37.0)  g/dL


 


APTT  20.5 L    (22.0-30.0)  sec


 


Chloride     ()  mmol/L


 


BUN     (9-20)  mg/dL


 


Glucose    126 H  (74-99)  mg/dL


 


POC Glucose (mg/dL)     (75-99)  mg/dL


 


Total Protein    6.2 L  (6.3-8.2)  g/dL


 


Albumin    3.4 L  (3.5-5.0)  g/dL














  02/13/18 02/13/18 02/14/18 Range/Units





  20:26 23:57 05:51 


 


RBC   2.27 L  2.26 L  (4.30-5.90)  m/uL


 


Hgb   7.1 L  7.0 L*  (13.0-17.5)  gm/dL


 


Hct   22.3 L  23.1 L  (39.0-53.0)  %


 


MCV    102.0 H  (80.0-100.0)  fL


 


MCHC    30.2 L  (31.0-37.0)  g/dL


 


APTT     (22.0-30.0)  sec


 


Chloride     ()  mmol/L


 


BUN     (9-20)  mg/dL


 


Glucose     (74-99)  mg/dL


 


POC Glucose (mg/dL)  114 H    (75-99)  mg/dL


 


Total Protein     (6.3-8.2)  g/dL


 


Albumin     (3.5-5.0)  g/dL














  02/14/18 Range/Units





  05:51 


 


RBC   (4.30-5.90)  m/uL


 


Hgb   (13.0-17.5)  gm/dL


 


Hct   (39.0-53.0)  %


 


MCV   (80.0-100.0)  fL


 


MCHC   (31.0-37.0)  g/dL


 


APTT   (22.0-30.0)  sec


 


Chloride  108 H  ()  mmol/L


 


BUN  8 L  (9-20)  mg/dL


 


Glucose   (74-99)  mg/dL


 


POC Glucose (mg/dL)   (75-99)  mg/dL


 


Total Protein   (6.3-8.2)  g/dL


 


Albumin   (3.5-5.0)  g/dL














Assessment and Plan


(1) Gastrointestinal hemorrhage


Narrative/Plan: 


Suspect colonic diverticular bleed however upper GI pathology cannot be 

excluded.


Current Visit: Yes   Status: Acute   Code(s): K92.2 - GASTROINTESTINAL 

HEMORRHAGE, UNSPECIFIED   SNOMED Code(s): 41396104


   





(2) Acute blood loss anemia


Current Visit: Yes   Status: Acute   Code(s): D62 - ACUTE POSTHEMORRHAGIC 

ANEMIA   SNOMED Code(s): 653641355


   





(3) Macrocytosis


Current Visit: Yes   Status: Acute   Code(s): D75.89 - OTHER SPECIFIED DISEASES 

OF BLOOD AND BLOOD-FORMING ORGANS   SNOMED Code(s): 150921807


   





(4) ETOH abuse


Current Visit: Yes   Status: Acute   Code(s): F10.10 - ALCOHOL ABUSE, 

UNCOMPLICATED   SNOMED Code(s): 09481922


   





(5) Colon, diverticulosis


Current Visit: Yes   Status: Acute   Code(s): K57.30 - DVRTCLOS OF LG INT W/O 

PERFORATION OR ABSCESS W/O BLEEDING   SNOMED Code(s): 179016675


   





(6) History of MI (myocardial infarction)


Current Visit: Yes   Status: Acute   Code(s): I25.2 - OLD MYOCARDIAL INFARCTION

   SNOMED Code(s): 991892153


   


Plan: 


1.  Endoscopic exams were discussed with patient however at this time he is 

requesting surgical service to perform exams as necessary.  This request was 

communicated to the medical staff; surgical consult requested.


2.  Continue CBC monitoring.  Recommend Protonix 40 mg IV daily.


3.  We'll sign off and be available for additional questions or concerns.





Thank you for this kind referral and the opportunity to participate in the care 

of your patient.  This consultation was discussed with Dr. Spencer.  The 

impression and plan of care have been directed as dictated.

## 2018-02-14 NOTE — P.CRDCN
History of Present Illness


Consult date: 02/14/18


History of present illness: 


This is a 57-year-old gentleman with history of ischemic heart disease and 

previous inferior wall MI.  Patient had stent placement of the RCA in the 

setting of acute myocardial infarctions.  Subsequently, patient had stent 

placement of the left anterior descending coronary artery in December.  Since 

then patient has been doing well.  Patient has been on dual antiplatelet agents 

including aspirin and Plavix.  Patient is now admitted to the hospital with 

recurrent bloody diarrhea episodes.  Patient is anemic.  Denies any chest pain 

or shortness of breath.  Complains of fatigue.  Waiting to have a GI 

evaluation.  He received his Plavix and aspirin.  Yesterday.  Once patient is 

seen and evaluated by gastroenterology, will restart antiplatelet agents  as 

soon as possible.  Probably will start with aspirin followed by Plavix.  Rest 

of the medications to be continued.





Review of Systems





REVIEW OF SYSTEMS:


CONSTITUTIONAL:.  Patient is doing well. No complaints of fever or chills


EYES: Denies diplopia, blurring of vision


EARS, NOSE, MOUTH, THROAT: Denies headaches, denies sore throat.


CARDIOVASCULAR: Denies chest pain, denies shortness of breath, denies 

palpitations


RESPIRATORY: Denies shortness of breath, denies cough.


GASTROINTESTINAL: Denies change in appetite, denies abdominal pain, denies 

diarrhea


GENITOURINARY: Denies hematuria, denies infections.


MUSKULOSKELETAL: Denies pain, denies swelling.  Denies any cramps or 

claudication


INTEGUMENTARY: Denies rash, denies eczema.


NEUROLOGICAL:  Denies  focal weakness, or visual disturbance.  Denies any 

dizziness or syncope


PSYCHIATRIC: Denies anxiety, denies depression.


HEMATOLOGIC/LYMPHATIC: Denies any bleeding, denies enlarged lymph nodes.








Past Medical History


Past Medical History: Coronary Artery Disease (CAD), Cancer, Chest Pain / Angina

, Myocardial Infarction (MI)


Additional Past Medical History / Comment(s): 10/23/17 stemi,  skin cancer 

removal-right eyebrow, kidney stone which pt passed on his own.


Last Myocardial Infarction Date:: 10/23/17


History of Any Multi-Drug Resistant Organisms: None Reported


Past Surgical History: Heart Catheterization With Stent, Tonsillectomy


Additional Past Surgical History / Comment(s): skin cancer removal, 3 stents on 

10/23/17, 12/12/17 cardiac cath with maximizing medical therapy.


Past Anesthesia/Blood Transfusion Reactions: No Reported Reaction


Date of Last Stent Placement:: 10/23/17


Smoking Status: Former smoker





- Past Family History


  ** Mother


Family Medical History: CVA/TIA





  ** Father


Family Medical History: Cancer


Additional Family Medical History / Comment(s): pancreatic





Medications and Allergies


 Home Medications











 Medication  Instructions  Recorded  Confirmed  Type


 


Aspirin 81 mg PO QAM 10/23/17 02/13/18 History


 


Cholecalciferol (Vitamin D3) 2,000 unit PO QAM 10/23/17 02/13/18 History





[Vitamin D3]    


 


Nitroglycerin Sl Tabs [Nitrostat] 0.4 mg SUBLINGUAL Q5M PRN #25 tab 10/25/17 02/

13/18 Rx


 


Atorvastatin [Lipitor] 80 mg PO QAM 12/08/17 02/13/18 History


 


Metoprolol Tartrate [Lopressor] 25 mg PO QAM 12/08/17 02/13/18 History


 


Clopidogrel [Plavix] 75 mg PO QAM 02/13/18 02/13/18 History











 Allergies











Allergy/AdvReac Type Severity Reaction Status Date / Time


 


No Known Allergies Allergy   Verified 02/13/18 10:36














Physical Exam


Vitals: 


 Vital Signs











  Temp Pulse Resp BP Pulse Ox


 


 02/14/18 08:00  98.5 F  87  15  138/73  99


 


 02/14/18 07:00   84  14  123/72  100


 


 02/14/18 06:00   87  14  135/73  99


 


 02/14/18 05:00   83  13  120/72  99


 


 02/14/18 04:00  98.3 F  79  18  127/70  97


 


 02/14/18 03:00   89  14  110/67  96


 


 02/14/18 02:00   84  16  115/70  97


 


 02/14/18 01:00   76  15  119/68  99


 


 02/14/18 00:02   79  18  124/68  100


 


 02/14/18 00:00  98.5 F  82  16  124/68  100


 


 02/13/18 23:00   86  12  110/64  99


 


 02/13/18 22:00   77  19  125/77  99


 


 02/13/18 21:00  98.4 F  78  14  140/76  100


 


 02/13/18 20:23   69  16   100


 


 02/13/18 20:04   78  18  170/81  100


 


 02/13/18 19:17   76  18  139/76  99


 


 02/13/18 17:48   70  16  165/75  100


 


 02/13/18 16:50   72  16  142/75  100


 


 02/13/18 14:37   73  16  145/81  100


 


 02/13/18 13:37   78  16  146/78  100


 


 02/13/18 12:37   88  16  132/84  100


 


 02/13/18 11:37   90  16  128/71  98


 


 02/13/18 11:25   89  16  136/73  100


 


 02/13/18 10:12  98.8 F  114 H  20  111/72  100








 Intake and Output











 02/13/18 02/14/18 02/14/18





 22:59 06:59 14:59


 


Intake Total 200 800 450


 


Output Total 250 475 450


 


Balance -50 325 0


 


Intake:   


 


  Intake, IV Titration 200 800 200





  Amount   


 


    Sodium Chloride 0.9% 1,  800 200





    000 ml @ 100 mls/hr IV .   





    Q10H ISA Rx#:[id number]   


 


    Sodium Chloride 0.9% 1, 200  





    000 ml @ 100 mls/hr IV .   





    Q10H STA Rx#:[id number]   


 


  Oral   250


 


Output:   


 


  Urine 250 475 450


 


Other:   


 


  Voiding Method Urinal Urinal 


 


  # Voids 1 1 


 


  Weight  82.4 kg 














GENERAL EXAM: Patient is alert and oriented and doesn't appear to be in any 

acute distress


HEENT: Normocephalic. Normal reaction of pupils, equal size, normal range of 

extraocular motion. No erythema or exudates in the throat.


NECK: No masses, no nuchal rigidity.


CHEST: No chest wall deformity.


LUNGS: Equal air entry with no crackles or wheeze.


HEART: S1 and S2 normal with no audible mumurs or gallops. Regular rhythm, 

femorals equal on both sides..


ABDOMEN: No hepatosplenomegaly, normal bowel sounds, no guarding or rigidity.


SKIN: No rashes


CENTRAL NERVOUS SYSTEM: No focal deficits.


EXTREMITIES: No cyanosis, clubbing or edema.





Results





 02/14/18 05:51





 02/14/18 05:51


 Cardiac Enzymes











  02/13/18 02/13/18 Range/Units





  10:50 10:50 


 


AST   25  (17-59)  U/L


 


CK-MB (CK-2)  0.4   (0.0-2.4)  ng/mL


 


Troponin I  <0.012   (0.000-0.034)  ng/mL








 Coagulation











  02/13/18 Range/Units





  10:50 


 


PT  9.8  (9.0-12.0)  sec


 


APTT  20.5 L  (22.0-30.0)  sec








 CBC











  02/13/18 02/13/18 02/14/18 Range/Units





  10:50 23:57 05:51 


 


WBC  6.7  6.6  5.0  (3.8-10.6)  k/uL


 


RBC  2.70 L  2.27 L  2.26 L  (4.30-5.90)  m/uL


 


Hgb  8.4 L  7.1 L  7.0 L*  (13.0-17.5)  gm/dL


 


Hct  26.3 L  22.3 L  23.1 L  (39.0-53.0)  %


 


Plt Count  350  348  344  (150-450)  k/uL








 Comprehensive Metabolic Panel











  02/13/18 02/14/18 Range/Units





  10:50 05:51 


 


Sodium  138  138  (137-145)  mmol/L


 


Potassium  4.3  3.7  (3.5-5.1)  mmol/L


 


Chloride  103  108 H  ()  mmol/L


 


Carbon Dioxide  25  23  (22-30)  mmol/L


 


BUN  13  8 L  (9-20)  mg/dL


 


Creatinine  0.98  0.90  (0.66-1.25)  mg/dL


 


Glucose  126 H  92  (74-99)  mg/dL


 


Calcium  9.2  8.4  (8.4-10.2)  mg/dL


 


AST  25   (17-59)  U/L


 


ALT  32   (21-72)  U/L


 


Alkaline Phosphatase  58   ()  U/L


 


Total Protein  6.2 L   (6.3-8.2)  g/dL


 


Albumin  3.4 L   (3.5-5.0)  g/dL








 Current Medications











Generic Name Dose Route Start Last Admin





  Trade Name Freq  PRN Reason Stop Dose Admin


 


Atorvastatin Calcium  80 mg  02/14/18 09:00  02/14/18 08:36





  Lipitor  PO   80 mg





  QAM ISA   Administration


 


Cholecalciferol  2,000 unit  02/14/18 12:00  





  Vitamin D3  PO   





  1200 ISA   


 


Sodium Chloride  1,000 mls @ 100 mls/hr  02/13/18 22:15  02/13/18 22:30





  Saline 0.9%  IV   100 mls/hr





  .Q10H ISA   Administration


 


Magnesium Sulfate/Dextrose 1  100 mls @ 100 mls/hr  02/14/18 07:30  02/14/18 08:

36





  gm/ IV Solution  IVPB  02/14/18 09:29  100 mls/hr





  Q1H ISA   Administration


 


Metoprolol Tartrate  25 mg  02/14/18 09:00  02/14/18 08:36





  Lopressor  PO   25 mg





  QAM ISA   Administration


 


Miscellaneous Information  1 each  02/14/18 07:12  





  Magnesium Per Protocol  MISCELLANE   





  DAILY PRN   





  Per Protocol   





  Protocol   


 


Miscellaneous Information  1 each  02/14/18 07:12  





  Potassium Per Protocol  MISCELLANE   





  DAILY PRN   





  Per Protocol   





  Protocol   


 


Nitroglycerin  0.4 mg  02/13/18 17:19  





  Nitrostat  SUBLINGUAL   





  Q5M PRN   





  Chest Pain   


 


Pantoprazole Sodium  40 mg  02/13/18 21:00  02/14/18 08:36





  Protonix  IVP   40 mg





  BID ISA   Administration








 Intake and Output











 02/13/18 02/14/18 02/14/18





 22:59 06:59 14:59


 


Intake Total 200 800 450


 


Output Total 250 475 450


 


Balance -50 325 0


 


Intake:   


 


  Intake, IV Titration 200 800 200





  Amount   


 


    Sodium Chloride 0.9% 1,  800 200





    000 ml @ 100 mls/hr IV .   





    Q10H ISA Rx#:[id number]   


 


    Sodium Chloride 0.9% 1, 200  





    000 ml @ 100 mls/hr IV .   





    Q10H STA Rx#:[id number]   


 


  Oral   250


 


Output:   


 


  Urine 250 475 450


 


Other:   


 


  Voiding Method Urinal Urinal 


 


  # Voids 1 1 


 


  Weight  82.4 kg 








 





 02/14/18 05:51 





 02/14/18 05:51 











Assessment and Plan


(1) CAD (coronary artery disease)


Current Visit: Yes   Status: Acute   Code(s): I25.10 - ATHSCL HEART DISEASE OF 

NATIVE CORONARY ARTERY W/O ANG PCTRS   SNOMED Code(s): 74833203


   





(2) Anemia


Current Visit: Yes   Status: Acute   Code(s): D64.9 - ANEMIA, UNSPECIFIED   

SNOMED Code(s): 300980706


   





(3) Gastrointestinal hemorrhage


Current Visit: Yes   Status: Acute   Code(s): K92.2 - GASTROINTESTINAL 

HEMORRHAGE, UNSPECIFIED   SNOMED Code(s): 59776144


   





(4) Large bowel obstruction


Current Visit: No   Status: Acute   Code(s): K56.60 - UNSPECIFIED INTESTINAL 

OBSTRUCTION * DO NOT USE *   SNOMED Code(s): 601158999


   





(5) History of myocardial infarction


Current Visit: Yes   Status: Acute   Code(s): I25.2 - OLD MYOCARDIAL INFARCTION

   SNOMED Code(s): 790620835


   


Plan: 


Continue to monitor his hemoglobin.  Waiting for gastroenterology evaluation.  

Most probably patient needs colonoscopy.  May start on aspirin as she is 

cleared by the gastroenterology.  Plavix to be added later on.

## 2018-02-14 NOTE — P.PN
Subjective


Progress Note Date: 02/14/18


Principal diagnosis: 





Acute GI bleed





This is a very pleasant 57-year-old gentleman who follows with Dr. Reddy Chung 

as his primary care physician.  He has a history of skin cancer with resection 

over the right eyebrow, nephrolithiasis, hyperlipidemia.  He has a remote 

history of smoking and daily alcohol consumption, 4-6 beers daily.  He has a 

history of coronary artery disease and had sustained an acute myocardial 

infarction in October 2017.  He had undergone cardiac catheterization and 

stenting 3 to the RCA.  There was moderate disease in the circumflex at that 

time along with some disease in the LAD.  He had undergone a subsequent cardiac 

catheterization on 12/12/2017 to evaluate the progression.  The stents were 

widely patent.  The LAD artery had diffuse distal disease in the distal one 

fourth.  Circumcised had a 50% mid lesion.  He was recommended continued 

medical therapy.  He has remained on Plavix and aspirin since October.  He 

presented here to the emergency room from his primary care physician's office 

after having a 3 day history of bloody bowel movements.  Initially they were 

bright red and subsequently they're black and tarry.  He was found to have a 

drop in hemoglobin from 14-8.4.  The recommendation is for transfer to the 

intensive care unit where consulted for the same.  He is seen in the emergency 

room.  He is awake and alert in no acute distress.  He has not had any further 

bloody bowel movements today.  He is maintaining good O2 saturations up to 100% 

on room air.  He's been afebrile.  Hemodynamically stable.  He has been 

initiated on Protonix 40 mg IV push twice a day.  0.9 normal saline at 100 MLS 

per hour following 1 L of fluid resuscitation.





The patient is seen again today 02/14/2018 in follow-up in the intensive care 

unit.  He is awake and alert in no acute distress.  He has not had a bowel 

movement since admission.  His hemoglobin has dropped today to 7.0.  The 

patient does have a previous history of diverticulosis with a few polyps 

removed back in 2014.  He has been seen again by GI services.  They did discuss 

endoscopic exams with the patient.  The patient however is requesting 

conservative surgical services and we are awaiting consultation from them.  In 

the interim we'll continue with IV Protonix.  He is otherwise stable.  

Afebrile.  No tachycardia.  No tachypnea.  He blood pressure stable.  

Maintaining good O2 saturations in the high 90s to 100% on room air.  No 

complaints of chest pain.  He has been seen by cardiology who is continuing to 

hold the Plavix.





Objective





- Vital Signs


Vital signs: 


 Vital Signs











Temp  98.5 F   02/14/18 08:00


 


Pulse  87   02/14/18 08:00


 


Resp  15   02/14/18 08:00


 


BP  138/73   02/14/18 08:00


 


Pulse Ox  99   02/14/18 08:00








 Intake & Output











 02/13/18 02/14/18 02/14/18





 18:59 06:59 18:59


 


Intake Total  1000 450


 


Output Total  725 450


 


Balance  275 0


 


Weight 76.657 kg 82.4 kg 


 


Intake:   


 


  Intake, IV Titration  1000 200





  Amount   


 


    Sodium Chloride 0.9% 1,  800 200





    000 ml @ 100 mls/hr IV .   





    Q10H ISA Rx#:[id number]   


 


    Sodium Chloride 0.9% 1,  200 





    000 ml @ 100 mls/hr IV .   





    Q10H STA Rx#:[id number]   


 


  Oral   250


 


Output:   


 


  Urine  725 450


 


Other:   


 


  Voiding Method  Urinal 


 


  # Voids  1 














- Exam





GENERAL EXAM: Alert, active, comfortable in no apparent distress.


HEAD: Normocephalic.


EYES: Normal reaction of pupils, equal size.


NOSE: Clear with pink turbinates.


THROAT: No erythema or exudates.


NECK: No masses, no JVD.


CHEST: No chest wall deformity.


LUNGS: Equal air entry with no crackles, wheeze, rhonchi or dullness.


CVS: S1 and S2 normal with no audible murmur, regular rhythm.


ABDOMEN: No hepatosplenomegaly, normal bowel sounds, no guarding or rigidity.


SPINE: No scoliosis or deformity


SKIN: No rashes


CENTRAL NERVOUS SYSTEM: No focal deficits, tone is normal in all 4 extremities.


EXTREMITIES: There is no peripheral edema.  No clubbing, no cyanosis.  

Peripheral pulses are intact.





- Labs


CBC & Chem 7: 


 02/14/18 05:51





 02/14/18 05:51


Labs: 


 Abnormal Lab Results - Last 24 Hours (Table)











  02/13/18 02/13/18 02/13/18 Range/Units





  10:50 10:50 10:50 


 


RBC   2.70 L   (4.30-5.90)  m/uL


 


Hgb   8.4 L   (13.0-17.5)  gm/dL


 


Hct   26.3 L   (39.0-53.0)  %


 


MCV     (80.0-100.0)  fL


 


MCHC     (31.0-37.0)  g/dL


 


APTT  20.5 L    (22.0-30.0)  sec


 


Chloride     ()  mmol/L


 


BUN     (9-20)  mg/dL


 


Glucose    126 H  (74-99)  mg/dL


 


POC Glucose (mg/dL)     (75-99)  mg/dL


 


Total Protein    6.2 L  (6.3-8.2)  g/dL


 


Albumin    3.4 L  (3.5-5.0)  g/dL














  02/13/18 02/13/18 02/14/18 Range/Units





  20:26 23:57 05:51 


 


RBC   2.27 L  2.26 L  (4.30-5.90)  m/uL


 


Hgb   7.1 L  7.0 L*  (13.0-17.5)  gm/dL


 


Hct   22.3 L  23.1 L  (39.0-53.0)  %


 


MCV    102.0 H  (80.0-100.0)  fL


 


MCHC    30.2 L  (31.0-37.0)  g/dL


 


APTT     (22.0-30.0)  sec


 


Chloride     ()  mmol/L


 


BUN     (9-20)  mg/dL


 


Glucose     (74-99)  mg/dL


 


POC Glucose (mg/dL)  114 H    (75-99)  mg/dL


 


Total Protein     (6.3-8.2)  g/dL


 


Albumin     (3.5-5.0)  g/dL














  02/14/18 Range/Units





  05:51 


 


RBC   (4.30-5.90)  m/uL


 


Hgb   (13.0-17.5)  gm/dL


 


Hct   (39.0-53.0)  %


 


MCV   (80.0-100.0)  fL


 


MCHC   (31.0-37.0)  g/dL


 


APTT   (22.0-30.0)  sec


 


Chloride  108 H  ()  mmol/L


 


BUN  8 L  (9-20)  mg/dL


 


Glucose   (74-99)  mg/dL


 


POC Glucose (mg/dL)   (75-99)  mg/dL


 


Total Protein   (6.3-8.2)  g/dL


 


Albumin   (3.5-5.0)  g/dL














Assessment and Plan


Assessment: 





Impression:





#1 Acute gastrointestinal bleeding with a drop of hemoglobin from 14-7.0.  The 

patient is a previous history of colonoscopy and polypectomy in 2014.





#2 Coronary artery disease with myocardial infarction in October 2017 status 

post stent placements 3 to the RCA. Maintained on aspirin and Plavix.  

Subsequent cardiac catheterization in December 2017 revealed patent stents.





#3 Daily alcohol consumption.





#4 Remote history of chronic tobacco dependence.





#5 History of nephrolithiasis.





#6 History of skin cancer removed from the right eyebrow.





Plan:





The patient was seen and evaluated by Dr. Bhatt.  The patient does have a 

further drop in hemoglobin currently at 7.0.  We'll continue to monitor 

closely.  GI services had consulted.  The patient has requested surgical 

services which are pending.  Cardiology is on the case as well and is 

continuing to hold the Plavix and aspirin until further direction from 

consultants. We will continue to follow and make further recommendations based 

on his clinical status.





I, the cosigning physician, performed a history & physical examination of the 

patient. Lungs sounds are clear.  Maintaining good O2 saturations in the 90s on 

room air.  I discussed the assessment and plan of care with my nurse 

practitioner, Fang Soto. I attest to the above note as dictated by her. 


Time with Patient: Greater than 30

## 2018-02-15 LAB
ANION GAP SERPL CALC-SCNC: 9 MMOL/L
BASOPHILS # BLD AUTO: 0.1 K/UL (ref 0–0.2)
BASOPHILS NFR BLD AUTO: 1 %
BUN SERPL-SCNC: 6 MG/DL (ref 9–20)
CALCIUM SPEC-MCNC: 8.8 MG/DL (ref 8.4–10.2)
CHLORIDE SERPL-SCNC: 108 MMOL/L (ref 98–107)
CO2 SERPL-SCNC: 23 MMOL/L (ref 22–30)
EOSINOPHIL # BLD AUTO: 0.2 K/UL (ref 0–0.7)
EOSINOPHIL NFR BLD AUTO: 5 %
ERYTHROCYTE [DISTWIDTH] IN BLOOD BY AUTOMATED COUNT: 2.3 M/UL (ref 4.3–5.9)
ERYTHROCYTE [DISTWIDTH] IN BLOOD BY AUTOMATED COUNT: 2.96 M/UL (ref 4.3–5.9)
ERYTHROCYTE [DISTWIDTH] IN BLOOD: 15.2 % (ref 11.5–15.5)
ERYTHROCYTE [DISTWIDTH] IN BLOOD: 15.7 % (ref 11.5–15.5)
GLUCOSE SERPL-MCNC: 106 MG/DL (ref 74–99)
HCT VFR BLD AUTO: 23.1 % (ref 39–53)
HCT VFR BLD AUTO: 29.9 % (ref 39–53)
HGB BLD-MCNC: 7 GM/DL (ref 13–17.5)
HGB BLD-MCNC: 9.4 GM/DL (ref 13–17.5)
LYMPHOCYTES # SPEC AUTO: 1.6 K/UL (ref 1–4.8)
LYMPHOCYTES NFR SPEC AUTO: 35 %
MAGNESIUM SPEC-SCNC: 2 MG/DL (ref 1.6–2.3)
MCH RBC QN AUTO: 30.5 PG (ref 25–35)
MCH RBC QN AUTO: 31.7 PG (ref 25–35)
MCHC RBC AUTO-ENTMCNC: 30.3 G/DL (ref 31–37)
MCHC RBC AUTO-ENTMCNC: 31.3 G/DL (ref 31–37)
MCV RBC AUTO: 100.6 FL (ref 80–100)
MCV RBC AUTO: 101.2 FL (ref 80–100)
MONOCYTES # BLD AUTO: 0.4 K/UL (ref 0–1)
MONOCYTES NFR BLD AUTO: 9 %
NEUTROPHILS # BLD AUTO: 2.2 K/UL (ref 1.3–7.7)
NEUTROPHILS NFR BLD AUTO: 46 %
PLATELET # BLD AUTO: 403 K/UL (ref 150–450)
PLATELET # BLD AUTO: 406 K/UL (ref 150–450)
POTASSIUM SERPL-SCNC: 4.4 MMOL/L (ref 3.5–5.1)
SODIUM SERPL-SCNC: 140 MMOL/L (ref 137–145)
WBC # BLD AUTO: 4.7 K/UL (ref 3.8–10.6)
WBC # BLD AUTO: 8.1 K/UL (ref 3.8–10.6)

## 2018-02-15 RX ADMIN — CEFAZOLIN SCH: 330 INJECTION, POWDER, FOR SOLUTION INTRAMUSCULAR; INTRAVENOUS at 04:15

## 2018-02-15 RX ADMIN — METOPROLOL TARTRATE SCH MG: 25 TABLET, FILM COATED ORAL at 08:50

## 2018-02-15 RX ADMIN — ATORVASTATIN CALCIUM SCH MG: 80 TABLET, FILM COATED ORAL at 08:50

## 2018-02-15 RX ADMIN — CEFAZOLIN SCH MLS/HR: 330 INJECTION, POWDER, FOR SOLUTION INTRAMUSCULAR; INTRAVENOUS at 14:36

## 2018-02-15 RX ADMIN — POTASSIUM CHLORIDE SCH: 14.9 INJECTION, SOLUTION INTRAVENOUS at 16:06

## 2018-02-15 RX ADMIN — PANTOPRAZOLE SODIUM SCH MG: 40 INJECTION, POWDER, FOR SOLUTION INTRAVENOUS at 08:50

## 2018-02-15 RX ADMIN — Medication SCH UNIT: at 12:49

## 2018-02-15 RX ADMIN — PANTOPRAZOLE SODIUM SCH MG: 40 INJECTION, POWDER, FOR SOLUTION INTRAVENOUS at 22:28

## 2018-02-15 NOTE — P.PN
Progress Note - Text


Progress Note Date: 02/15/18


DATE OF SERVICE: 


02/15/2018





PRESENTING COMPLAINT:


Lower GI bleed





HISTORY OF PRESENT ILLNESS:


57-year-old male who has a history of having an ST elevation myocardial 

infarction back on October 23 of 2017 with a stent placement to the RCA.  Came 

back to the cath lab in December found to have diffuse LAD with no intervention 

done at that time and only had medical management.  Repeat echocardiogram 

showed EF of 50-55%.  Aspirin Plavix since that time.  4 days ago patient 

developed a bout of diarrhea and started having multiple bloody stools for the 

last 3 days.  Last placed to was day before admission and began feeling dizzy 

tired and rundown.  Hemoglobin in the emergency department was found to be 8.4 

patient had symptoms.  Admitted for the same.





INTERVAL HISTORY:


02/15/2018:


Patient sitting up in bed, hemoglobin 7 this morning.  Receive unit of packed 

red cells later today.  Had an episode of tachycardia heart rate in the 140s 

for brief at time.  In light of patient's symptoms general surgery will be 

doing an upper and lower scope tomorrow.  Bowel prep will be initiated today.  

Nothing by mouth after midnight.  Tolerating his diet eating about 50% of his 

meals.  Ambulatory to and from the bathroom without difficulty.  Last BM 02/14/ 2018.





REVIEW OF SYSTEMS:


Done for constitutional ,cardiovascular, GI, pulmonary with relevant findings 

as above.





CURRENT MEDICATIONS








PHYSICAL EXAM


VITAL SIGNS: 


Temperature 97.8, pulse 78, respiratory rate 16, blood pressure 124/76, oxygen 

saturation 98% on room air.





GENERAL APPEARANCE:  Lying in bed, not in distress. 


HENT: Normocephalic, JVD not raised. Mass not palpable.  Oral cavity normal, 

external appearance of ears and nose normal.


EYES:Pupils equal. Conjunctiva pale.


RESPIRATORY: Respiratory effort normal. Lungs managed to auscultation. 


CARDIOVASCULAR: First and second sounds normal. No edema. 


ABDOMEN: Soft. Liver and spleen not palpable. No tenderness. No mass palpable. 


PSYCHIATRY: Alert and oriented x3. Mood and affect normal. 





INVESTIGATIONS:


LABS: Hemoglobin 7.0, chloride 108,





ASSESSMENT:


-Acute severe gastrointestinal bleed in a patient who is on aspirin and Plavix 

and also drinks about 6 beers a day.  Likely source is gastric.


-Acute severe blood loss anemia symptomatic with hemoglobin repeatedly dropping.


-Coronary artery disease with stent to the RCA and diffuse disease in the LAD.





PLAN:


Plans for upper and lower scope tomorrow nothing by mouth after midnight bowel 

prep tonight.  Aspirin Plavix remain on hold.  Plan of care discussed with the 

patient at bedside he is agreeable.  We'll follow closely.





NP statement: Patient was seen and examined by nurse practitioner Natalya Mancini and all elements of the case discussed with attending  Dr. Martínez

## 2018-02-15 NOTE — P.PN
Subjective


Progress Note Date: 02/15/18


Principal diagnosis: 


Acute GI bleeding








This is a very pleasant 57-year-old gentleman who follows with Dr. Reddy Chung 

as his primary care physician.  He has a history of skin cancer with resection 

over the right eyebrow, nephrolithiasis, hyperlipidemia.  He has a remote 

history of smoking and daily alcohol consumption, 4-6 beers daily.  He has a 

history of coronary artery disease and had sustained an acute myocardial 

infarction in October 2017.  He had undergone cardiac catheterization and 

stenting 3 to the RCA.  There was moderate disease in the circumflex at that 

time along with some disease in the LAD.  He had undergone a subsequent cardiac 

catheterization on 12/12/2017 to evaluate the progression.  The stents were 

widely patent.  The LAD artery had diffuse distal disease in the distal one 

fourth.  Circumcised had a 50% mid lesion.  He was recommended continued 

medical therapy.  He has remained on Plavix and aspirin since October.  He 

presented here to the emergency room from his primary care physician's office 

after having a 3 day history of bloody bowel movements.  Initially they were 

bright red and subsequently they're black and tarry.  He was found to have a 

drop in hemoglobin from 14-8.4.  The recommendation is for transfer to the 

intensive care unit where consulted for the same.  He is seen in the emergency 

room.  He is awake and alert in no acute distress.  He has not had any further 

bloody bowel movements today.  He is maintaining good O2 saturations up to 100% 

on room air.  He's been afebrile.  Hemodynamically stable.  He has been 

initiated on Protonix 40 mg IV push twice a day.  0.9 normal saline at 100 MLS 

per hour following 1 L of fluid resuscitation.





The patient is seen again today 02/14/2018 in follow-up in the intensive care 

unit.  He is awake and alert in no acute distress.  He has not had a bowel 

movement since admission.  His hemoglobin has dropped today to 7.0.  The 

patient does have a previous history of diverticulosis with a few polyps 

removed back in 2014.  He has been seen again by GI services.  They did discuss 

endoscopic exams with the patient.  The patient however is requesting 

conservative surgical services and we are awaiting consultation from them.  In 

the interim we'll continue with IV Protonix.  He is otherwise stable.  

Afebrile.  No tachycardia.  No tachypnea.  He blood pressure stable.  

Maintaining good O2 saturations in the high 90s to 100% on room air.  No 

complaints of chest pain.  He has been seen by cardiology who is continuing to 

hold the Plavix.





On 02/15/2018 patient seen in follow-up on the surgical floor.  Denies any 

acute distress, denies any chest pain, lightheadedness or dizziness or 

weakness.  He does become slightly winded with ambulation, but does recover 

would rest.  He was noted to be tachycardic as well with activity, with a heart 

rate going up in the 140s while he is ambulating, and returning back to baseline

, to around 80-90 BPM.  He had only one episode of small black tarry stool last 

night, after he had eaten a sandwich.  That was his first regular meal since 

admission.  No further episodes of GI bleeding.  Hemoglobin today 7.0.  Patient 

did not require any blood transfusions during this admission.  Surgery is 

following regarding the acute GI bleeding, and the plan is to discharge home 

today if he remains stable.  Aspirin and Plavix remain on hold.  From pulmonary/

critical care standpoint there are no acute issues at this point, we will 

follow the patient on as-needed basis.





Objective





- Vital Signs


Vital signs: 


 Vital Signs











Temp  98.3 F   02/15/18 07:00


 


Pulse  90   02/15/18 07:00


 


Resp  16   02/15/18 07:00


 


BP  131/74   02/15/18 07:00


 


Pulse Ox  98   02/15/18 07:00








 Intake & Output











 02/14/18 02/15/18 02/15/18





 18:59 06:59 18:59


 


Intake Total 3550 1180 240


 


Output Total 2050  


 


Balance 1500 1180 240


 


Intake:   


 


  Intake, IV Titration 1300 700 





  Amount   


 


    Magnesium Sulfate-D5w Pmx 200  





    1 gm In Dextrose/Water 1   





    100ml.bag @ 100 mls/hr   





    IVPB Q1H ISA Rx#:   





    [id number]   


 


    Sodium Chloride 0.9% 1, 1100 700 





    000 ml @ 100 mls/hr IV .   





    Q10H ISA Rx#:[id number]   


 


  Oral 2250 480 240


 


Output:   


 


  Urine 2050  


 


Other:   


 


  Voiding Method Urinal Toilet Toilet


 


  # Voids 5 2 


 


  # Bowel Movements  1 














- Exam


GENERAL EXAM: Alert, pleasant, 57-year-old white male comfortable in no 

apparent distress.


HEAD: Normocephalic/atraumatic.


EYES: Normal reaction of pupils, equal size.  Conjunctiva pink, sclera white.


NOSE: Clear with pink turbinates.


THROAT: No erythema or exudates.


NECK: No masses, no JVD, no thyroid enlargement, no adenopathy.


CHEST: No chest wall deformity.  Symmetrical expansion. 


LUNGS: Equal air entry with no crackles, wheeze, rhonchi or dullness.


CVS: Regular rate and rhythm, normal S1 and S2, no gallops, no murmurs, no rubs


ABDOMEN: Soft, nontender.  No hepatosplenomegaly, normal bowel sounds, no 

guarding or rigidity.


EXTREMITIES: No clubbing, no edema, no cyanosis, 2+ pulses and upper and lower 

extremities.


MUSCULOSKELETAL: Muscle strength and tone normal.


SPINE: No scoliosis or deformity


SKIN: No rashes


CENTRAL NERVOUS SYSTEM: Alert and oriented -3.  No focal deficits, tone is 

normal in all 4 extremities.


PSYCHIATRIC: Alert and oriented -3.  Appropriate affect.  Intact judgment and 

insight.











- Labs


CBC & Chem 7: 


 02/15/18 06:59





 02/15/18 06:59


Labs: 


 Abnormal Lab Results - Last 24 Hours (Table)











  02/13/18 02/14/18 02/14/18 Range/Units





  10:50 11:24 18:19 


 


RBC   2.34 L  2.32 L  (4.30-5.90)  m/uL


 


Hgb   7.4 L  7.2 L  (13.0-17.5)  gm/dL


 


Hct   24.0 L  23.0 L  (39.0-53.0)  %


 


MCV   102.6 H   (80.0-100.0)  fL


 


MCHC   30.8 L   (31.0-37.0)  g/dL


 


Chloride     ()  mmol/L


 


BUN     (9-20)  mg/dL


 


Glucose     (74-99)  mg/dL


 


Crossmatch  See Detail    














  02/15/18 02/15/18 Range/Units





  06:59 06:59 


 


RBC  2.30 L   (4.30-5.90)  m/uL


 


Hgb  7.0 L*   (13.0-17.5)  gm/dL


 


Hct  23.1 L   (39.0-53.0)  %


 


MCV  100.6 H   (80.0-100.0)  fL


 


MCHC  30.3 L   (31.0-37.0)  g/dL


 


Chloride   108 H  ()  mmol/L


 


BUN   6 L  (9-20)  mg/dL


 


Glucose   106 H  (74-99)  mg/dL


 


Crossmatch    














Assessment and Plan


Plan: 


Assessment:





#1 Acute gastrointestinal bleeding with a drop of hemoglobin from 14-7.0.  The 

patient is a previous history of colonoscopy and polypectomy in 2014.  Today's 

hemoglobin is 7.0, patient remains hemodynamically stable.  Only had one 

episode of dark tarry stool last night after he was initiated on a regular diet

, no further GI bleeding.





#2 Coronary artery disease with myocardial infarction in October 2017 status 

post stent placements 3 to the RCA. Maintained on aspirin and Plavix.  

Subsequent cardiac catheterization in December 2017 revealed patent stents.





#3 Daily alcohol consumption.





#4 Remote history of chronic tobacco dependence.





#5 History of nephrolithiasis.





#6 History of skin cancer removed from the right eyebrow.





Plan:





Patient's hemoglobin today 7.0, it remains stable, last night he was started on 

a regular diet, shortly afterward he did have an episode of dark tarry stool, 

the patient remains hemodynamically stable, does become a little winded with 

ambulation, but quickly recovers, does become tachycardic with activity as well

, but it is nonsustained.  He will be given his home dose metoprolol, 

cardiology is following.  He denies any chest pain.  His lungs are clear.  From 

pulmonary/critical care standpoint, patient is stable.  Surgery is planning on 

discharging the patient home today.  We will follow with the patient on as-

needed basis.  Thank you for this consultation,





I performed a history & physical examination of the patient and discussed their 

management with my nurse practitioner, Geetha Dubon.  I reviewed the nurse 

practitioner's note and agree with the documented findings and plan of care.  

Lung sounds are clear.  The findings and the impression was discussed with the 

patient.  I attest to the documentation by the nurse practitioner. 








Time with Patient: Less than 30

## 2018-02-15 NOTE — PN
PROGRESS NOTE



DATE OF SERVICE:

February 15, 2018.



ATTENDING NOTE:

The patient seen and examined by me.  Discussed with nurse practitioner Ms. Mancini.

This is a patient with GI bleed from antiplatelet agents, hemoglobin dropped earlier

today.  I did order a unit of blood.  The patient is pending endoscopy.  No chest pain.



EXAMINATION:

Lungs are clear.  Cardiovascular 1st and 2nd sounds normal.



INVESTIGATIONS:

Hemoglobin 7.



ASSESSMENT:

1. Acute GI bleed from patient being on antiplatelet agents.

2. Acute blood-loss anemia.

3. Coronary artery disease, stent to RCA and diffuse diseased LAD.



PLAN:

The patient was ordered a unit of blood earlier today.  Repeat hemoglobin is ordered.

Did speak to Car[facility] to request Dr. Desai to also do EGD along with a colonoscopy,

scheduled for tomorrow.





MMODL / IJN: [id number] / Job#: [id number]

## 2018-02-15 NOTE — P.PN
Subjective


Progress Note Date: 02/15/18





57-year-old male seen and examined at bedside.  Patient did have a maroon 

bloody stool last evening.  Patients being seen at the request of the attending 

for a GI bleed.  Hemoglobin this morning 7.   Did note the patient did have an 

episode of tachycardia this morning heart rate was up in the 140s.   Patient 

denied chest pain dizziness or lightheadedness.   Did discuss greater than 15 

minutes at the bedside the plan of care.   Patient is agreeing to undergo 

endoscopic if indicated.  Patient does have a history of coronary artery 

disease with prior coronary stenting.  Patient has been seen by cardiology 

service.





Objective





- Vital Signs


Vital signs: 


 Vital Signs











Temp  98.5 F   02/15/18 12:13


 


Pulse  65   02/15/18 12:13


 


Resp  16   02/15/18 12:13


 


BP  110/69   02/15/18 12:13


 


Pulse Ox  97   02/15/18 12:13








 Intake & Output











 02/14/18 02/15/18 02/15/18





 18:59 06:59 18:59


 


Intake Total 3550 1180 240


 


Output Total 2050  


 


Balance 1500 1180 240


 


Intake:   


 


  Intake, IV Titration 1300 700 





  Amount   


 


    Magnesium Sulfate-D5w Pmx 200  





    1 gm In Dextrose/Water 1   





    100ml.bag @ 100 mls/hr   





    IVPB Q1H ISA Rx#:   





    [id number]   


 


    Sodium Chloride 0.9% 1, 1100 700 





    000 ml @ 100 mls/hr IV .   





    Q10H ISA Rx#:[id number]   


 


  Oral 2250 480 240


 


  Blood Product   0


 


    Rc As-1  Unit   0





    [id number]   


 


Output:   


 


  Urine 2050  


 


Other:   


 


  Voiding Method Urinal Toilet Toilet


 


  # Voids 5 2 


 


  # Bowel Movements  1 














- Exam





Physical exam


57-year-old male sitting up in bed denies dizziness lightheadedness.


Lungs diminished at the bases otherwise adequate air movement


Heart S1-S2 audible regular


Abdomen soft flat nontender not distended bowel tones present reports of nausea 

vomiting


Extremities no edema





- Labs


CBC & Chem 7: 


 02/15/18 06:59





 02/15/18 06:59


Labs: 


 Abnormal Lab Results - Last 24 Hours (Table)











  02/13/18 02/14/18 02/15/18 Range/Units





  10:50 18:19 06:59 


 


RBC   2.32 L  2.30 L  (4.30-5.90)  m/uL


 


Hgb   7.2 L  7.0 L*  (13.0-17.5)  gm/dL


 


Hct   23.0 L  23.1 L  (39.0-53.0)  %


 


MCV    100.6 H  (80.0-100.0)  fL


 


MCHC    30.3 L  (31.0-37.0)  g/dL


 


Chloride     ()  mmol/L


 


BUN     (9-20)  mg/dL


 


Glucose     (74-99)  mg/dL


 


Crossmatch  See Detail    














  02/15/18 Range/Units





  06:59 


 


RBC   (4.30-5.90)  m/uL


 


Hgb   (13.0-17.5)  gm/dL


 


Hct   (39.0-53.0)  %


 


MCV   (80.0-100.0)  fL


 


MCHC   (31.0-37.0)  g/dL


 


Chloride  108 H  ()  mmol/L


 


BUN  6 L  (9-20)  mg/dL


 


Glucose  106 H  (74-99)  mg/dL


 


Crossmatch   














Assessment and Plan


Assessment: 





Impression


Present on admission acute blood loss anemia suspect due to a gastrointestinal 

hemorrhage from diverticula colonic bleed


History of chronic daily consumption alcohol abuse


colon Diverticulosis


Known coronary artery disease with prior coronary stenting with a myocardial 

infarction October 2017 with a subsequent heart catheterization December 2017 8 

coronary stents


Sinus tachycardia








Plan


Will transfuse 1 unit of packed red blood cells today


Scheduled for an EGD and colonoscopy tomorrow


Start bowel prep today


Clear liquid diet


Nothing by mouth after midnight


Aspirin Plavix have been held


Further recommendations pending the findings after EGD and colonoscopy











The above impression and plan of care have been discussed and directed by 

signing physician. Marlee Simon nurse practitioner acting as scribe for signing 

physician.

## 2018-02-15 NOTE — P.PN
Subjective


Progress Note Date: 02/15/18





Mr. Franco is seen today in follow-up on the surgical floor. Initially when 

entering the room he was up in the bathroom and telemetry was showing heart 

rate in the 150's. It appeared sinus. Per nursing this has been happening every 

time he gets up and minimally exerts himself. After getting back into bed and 

resting for a few minutes his heart rate goes back down to the 90's. He denies 

symptoms of chest pain although does admit to mild shortness of breath. He 

states he had been up to the bathroom having a dark red stool this morning. Hgb 

today 7.0. Plan is for colonoscopy tomorrow with Dr. Desai. Discussion was 

had with the patient regarding importance of resuming aspirin immediately and 

plavix can be discussed down the road as an outpatient if needed. 





Catherization report from December 2017 reveals 3 patent stents to the distal, 

mid and proximal RCA, left main is free of disease, LAD with diffuse disease 

distally and 50% mid circumflex disease. At that time ongoing medical therapy 

was recommended.   





Objective





- Vital Signs


Vital signs: 


 Vital Signs











Temp  98.5 F   02/15/18 12:13


 


Pulse  65   02/15/18 12:13


 


Resp  16   02/15/18 12:13


 


BP  110/69   02/15/18 12:13


 


Pulse Ox  97   02/15/18 12:13








 Intake & Output











 02/14/18 02/15/18 02/15/18





 18:59 06:59 18:59


 


Intake Total 3550 1180 240


 


Output Total 2050  


 


Balance 1500 1180 240


 


Intake:   


 


  Intake, IV Titration 1300 700 





  Amount   


 


    Magnesium Sulfate-D5w Pmx 200  





    1 gm In Dextrose/Water 1   





    100ml.bag @ 100 mls/hr   





    IVPB Q1H [facility] Rx#:   





    [id number]   


 


    Sodium Chloride 0.9% 1, 1100 700 





    000 ml @ 100 mls/hr IV .   





    Q10H [facility] Rx#:[id number]   


 


  Oral 2250 480 240


 


  Blood Product   0


 


    Rc As-1  Unit   0





    [id number]   


 


Output:   


 


  Urine 2050  


 


Other:   


 


  Voiding Method Urinal Toilet Toilet


 


  # Voids 5 2 


 


  # Bowel Movements  1 














- Exam





Blood pressure 110/69 heart rate 65 afebrile


GENERAL: Well-appearing, well-nourished and in no acute distress.


NECK: Supple without JVD or thyromegaly.


LUNGS: Breath sounds clear to auscultation bilaterally. Respiration equal and 

unlabored.  No wheezes, rales or rhonchi.


HEART: Regular rate and rhythm without murmurs, rubs or gallops. S1 and S2 

heard.


EXTREMITIES: Normal range of motion, no edema.  No clubbing or cyanosis. 

Peripheral pulses intact and strong.





- Labs


CBC & Chem 7: 


 02/15/18 06:59





 02/15/18 06:59


Labs: 


 Abnormal Lab Results - Last 24 Hours (Table)











  02/13/18 02/14/18 02/15/18 Range/Units





  10:50 18:19 06:59 


 


RBC   2.32 L  2.30 L  (4.30-5.90)  m/uL


 


Hgb   7.2 L  7.0 L*  (13.0-17.5)  gm/dL


 


Hct   23.0 L  23.1 L  (39.0-53.0)  %


 


MCV    100.6 H  (80.0-100.0)  fL


 


MCHC    30.3 L  (31.0-37.0)  g/dL


 


Chloride     ()  mmol/L


 


BUN     (9-20)  mg/dL


 


Glucose     (74-99)  mg/dL


 


Crossmatch  See Detail    














  02/15/18 Range/Units





  06:59 


 


RBC   (4.30-5.90)  m/uL


 


Hgb   (13.0-17.5)  gm/dL


 


Hct   (39.0-53.0)  %


 


MCV   (80.0-100.0)  fL


 


MCHC   (31.0-37.0)  g/dL


 


Chloride  108 H  ()  mmol/L


 


BUN  6 L  (9-20)  mg/dL


 


Glucose  106 H  (74-99)  mg/dL


 


Crossmatch   














Assessment and Plan


Assessment: 





ASSESSMENT


1. Coronary artery disease with recent stent placement


2. Acute blood loss anemia


3. Former tobacco use, quit 10/2017


4. Chronic daily alcohol use








PLAN


Continue to monitor hemoglobin. 


Await results of colonoscopy tomorrow for evidence of active bleedings. 


Restart aspirin as soon as possible  when cleared per surgery. 


Plavix can be resumed at a later date once all symptoms have resolved as an 

outpatient. 





Nurse Practitioner note has been reviewed, I agree with a documented findings 

and plan of care.  Patient was seen and examined.

## 2018-02-15 NOTE — P.PN
Progress Note - Text


Progress Note Date: 02/15/18





Patient report's passing old blood this morning. From his prep, he denies any 

blood in stools. He had nausea earlier from his prep and had clear emesis. Plan 

for upper and lower scope per admitting team. If findings are unremarkable, 

potential discharge home after tolerating diet post-procedure with resumption 

of aspirin.

## 2018-02-15 NOTE — P.GSCN
History of Present Illness


Consult date: 02/14/18


Reason for Consult: 





GI Bleed


History of present illness: 











DATE OF CONSULTATION:  2/14/2018 





CHIEF COMPLAINT: GI bleed





HISTORY OF PRESENT ILLNESS: 


The patient's a 57-year-old gentleman who presents after having bloody bowel 

movement prior to admission.  He has been on Plavix for underlying coronary 

artery disease with cardiac catheterization and stent placement.  He had been 

in the ICU where his hemoglobin has been monitored.  His hemoglobin on 

presentation was less than 8.  No blood transfusions as he has been clinically 

stable.  Since transfer from the ICU to the floor, no reports of abdominal 

pain.  No reports of bloody bowel movements.  He is tolerating liquid diet.  He 

expresses desire to eat solid food.  Last colonoscopy was in 2014 with known 

history of diverticulosis as well.  His Plavix has been discontinued.





PAST MEDICAL HISTORY: 


See list.





PAST SURGICAL HISTORY: 


Last colonoscopy 2014.


See list.





MEDICATIONS: 


See list.





ALLERGIES: 


See list.





SOCIAL HISTORY: No illicit drug use.





FAMILY HISTORY:  Significant for cardiac disease.  





REVIEW OF ORGAN SYSTEMS: 


CONSTITUTIONAL: Denies recent weight loss.


HEENT: No troubles with vision or hearing. No reports of dysphagia.  


ENDOCRINE: No reports of thyroid disorders. 


CARDIOVASCULAR: Previous cardiac catheterization with stent placement.


RESPIRATORY: No recent pneumonias.  No distention exertion.


GASTROINTESTINAL: Recent blood in stools.  No active gastric esophageal reflux 

disease.


NEURO: No reports of stroke or seizure disorders. 


PSYCH: No reports of depression or suicidal ideation. 


HEMATOLOGIC: He is on antiplatelet therapy, Plavix.


LYMPHATIC:  The patient denies any lumps and bumps around the neck. 


GENITOURINARY:  Denies any blood in urine or increased urinary frequency.  


MUSCULOSKELETAL:  Has back pain, stiffness or joint arthritis. 





PHYSICAL EXAM: 


VITAL SIGNS: Currently stable.


GENERAL: Well-developed male in no acute distress. 


HEENT:  No sclera icterus. Extraocular movements grossly intact.  Moist buccal 

mucosa. 


Head is atraumatic, normocephalic. Hears conversational speech. No nasal 

drainage.


NECK:  Supple without lymphadenopathy.


CHEST:  Non-labored respirations and equal bilateral excursions. 


CARDIOVASCULAR: Regular rate regular rhythm.  Palpable 2+ radial pulses.


ABDOMEN:  Soft.  Nondistended.  Nontender.


MUSCULOSKELETAL:  No clubbing, cyanosis or edema.


NEUROLOGIC:  No focal or lateralizing signs.  Cranial nerves II through XII 

grossly intact.


PSYCH:  Appropriate affect.  Alert and oriented to person, place and time.





LABS: Reviewed.





ASSESSMENT: 


1.  Gastrointestinal bleeding.


2.  History of antiplatelet therapy adverse event.


3.  Coronary artery disease.


4.  Diverticulosis, likely cause of lower GI bleed.





PLAN: 


1.  Patient is clinically stable despite hemoglobin is 8.  Continue monitor.


2.  May start regular diet.


3.  Discussed this patient pending normal bowel movements without blood, 

patient may potentially discharge.


4.  Follow-up as outpatient was described.








Thank you for this kind consultation.





Past Medical History


Past Medical History: Coronary Artery Disease (CAD), Cancer, Chest Pain / Angina

, Myocardial Infarction (MI)


Additional Past Medical History / Comment(s): 10/23/17 stemi,  skin cancer 

removal-right eyebrow, kidney stone which pt passed on his own.


Last Myocardial Infarction Date:: 10/23/17


History of Any Multi-Drug Resistant Organisms: None Reported


Past Surgical History: Heart Catheterization With Stent, Tonsillectomy


Additional Past Surgical History / Comment(s): skin cancer removal, 3 stents on 

10/23/17, 12/12/17 cardiac cath with maximizing medical therapy.


Past Anesthesia/Blood Transfusion Reactions: No Reported Reaction


Date of Last Stent Placement:: 10/23/17


Smoking Status: Former smoker





- Past Family History


  ** Mother


Family Medical History: CVA/TIA





  ** Father


Family Medical History: Cancer


Additional Family Medical History / Comment(s): pancreatic





Medications and Allergies


 Home Medications











 Medication  Instructions  Recorded  Confirmed  Type


 


Aspirin 81 mg PO QAM 10/23/17 02/13/18 History


 


Cholecalciferol (Vitamin D3) 2,000 unit PO QAM 10/23/17 02/13/18 History





[Vitamin D3]    


 


Nitroglycerin Sl Tabs [Nitrostat] 0.4 mg SUBLINGUAL Q5M PRN #25 tab 10/25/17 02/

13/18 Rx


 


Atorvastatin [Lipitor] 80 mg PO QAM 12/08/17 02/13/18 History


 


Metoprolol Tartrate [Lopressor] 25 mg PO QAM 12/08/17 02/13/18 History


 


Clopidogrel [Plavix] 75 mg PO QAM 02/13/18 02/13/18 History











 Allergies











Allergy/AdvReac Type Severity Reaction Status Date / Time


 


No Known Allergies Allergy   Verified 02/13/18 10:36














Surgical - Exam


 Vital Signs











Temp Pulse Resp BP Pulse Ox


 


 98.8 F   114 H  20   111/72   100 


 


 02/13/18 10:12  02/13/18 10:12  02/13/18 10:12  02/13/18 10:12  02/13/18 10:12














Results





- Labs





 02/14/18 18:19





 02/14/18 18:19


 Abnormal Lab Results - Last 24 Hours (Table)











  02/13/18 02/13/18 02/13/18 Range/Units





  10:50 20:26 23:57 


 


RBC    2.27 L  (4.30-5.90)  m/uL


 


Hgb    7.1 L  (13.0-17.5)  gm/dL


 


Hct    22.3 L  (39.0-53.0)  %


 


MCV     (80.0-100.0)  fL


 


MCHC     (31.0-37.0)  g/dL


 


Chloride     ()  mmol/L


 


BUN     (9-20)  mg/dL


 


POC Glucose (mg/dL)   114 H   (75-99)  mg/dL


 


Crossmatch  See Detail    














  02/14/18 02/14/18 02/14/18 Range/Units





  05:51 05:51 11:24 


 


RBC  2.26 L   2.34 L  (4.30-5.90)  m/uL


 


Hgb  7.0 L*   7.4 L  (13.0-17.5)  gm/dL


 


Hct  23.1 L   24.0 L  (39.0-53.0)  %


 


MCV  102.0 H   102.6 H  (80.0-100.0)  fL


 


MCHC  30.2 L   30.8 L  (31.0-37.0)  g/dL


 


Chloride   108 H   ()  mmol/L


 


BUN   8 L   (9-20)  mg/dL


 


POC Glucose (mg/dL)     (75-99)  mg/dL


 


Crossmatch     














  02/14/18 Range/Units





  18:19 


 


RBC  2.32 L  (4.30-5.90)  m/uL


 


Hgb  7.2 L  (13.0-17.5)  gm/dL


 


Hct  23.0 L  (39.0-53.0)  %


 


MCV   (80.0-100.0)  fL


 


MCHC   (31.0-37.0)  g/dL


 


Chloride   ()  mmol/L


 


BUN   (9-20)  mg/dL


 


POC Glucose (mg/dL)   (75-99)  mg/dL


 


Crossmatch   








 Diabetes panel











  02/14/18 02/14/18 Range/Units





  05:51 18:19 


 


Sodium  138   (137-145)  mmol/L


 


Potassium  3.7  4.0  (3.5-5.1)  mmol/L


 


Chloride  108 H   ()  mmol/L


 


Carbon Dioxide  23   (22-30)  mmol/L


 


BUN  8 L   (9-20)  mg/dL


 


Creatinine  0.90   (0.66-1.25)  mg/dL


 


Glucose  92   (74-99)  mg/dL


 


Calcium  8.4   (8.4-10.2)  mg/dL








 Calcium panel











  02/14/18 Range/Units





  05:51 


 


Calcium  8.4  (8.4-10.2)  mg/dL


 


Phosphorus  3.7  (2.5-4.5)  mg/dL








 Pituitary panel











  02/14/18 02/14/18 Range/Units





  05:51 18:19 


 


Sodium  138   (137-145)  mmol/L


 


Potassium  3.7  4.0  (3.5-5.1)  mmol/L


 


Chloride  108 H   ()  mmol/L


 


Carbon Dioxide  23   (22-30)  mmol/L


 


BUN  8 L   (9-20)  mg/dL


 


Creatinine  0.90   (0.66-1.25)  mg/dL


 


Glucose  92   (74-99)  mg/dL


 


Calcium  8.4   (8.4-10.2)  mg/dL








 Adrenal panel











  02/14/18 02/14/18 Range/Units





  05:51 18:19 


 


Sodium  138   (137-145)  mmol/L


 


Potassium  3.7  4.0  (3.5-5.1)  mmol/L


 


Chloride  108 H   ()  mmol/L


 


Carbon Dioxide  23   (22-30)  mmol/L


 


BUN  8 L   (9-20)  mg/dL


 


Creatinine  0.90   (0.66-1.25)  mg/dL


 


Glucose  92   (74-99)  mg/dL


 


Calcium  8.4   (8.4-10.2)  mg/dL














Assessment and Plan


(1) Antiplatelet or antithrombotic long-term use


Current Visit: Yes   Status: Acute   Code(s): Z79.02 - LONG TERM (CURRENT) USE 

OF ANTITHROMBOTICS/ANTIPLATELETS   SNOMED Code(s): 471640881


   





(2) Adverse effect of antiplatelet agent


Current Visit: Yes   Status: Acute   Code(s): T45.7X5A - ADVERSE EFFECT OF 

ANTICOAG ANTAG, VIT K AND OTH COAG, INIT   SNOMED Code(s): 412902667


   





(3) Acute blood loss anemia


Current Visit: Yes   Status: Acute   Code(s): D62 - ACUTE POSTHEMORRHAGIC 

ANEMIA   SNOMED Code(s): 480390071


   





(4) CAD (coronary artery disease)


Current Visit: Yes   Status: Acute   Code(s): I25.10 - ATHSCL HEART DISEASE OF 

NATIVE CORONARY ARTERY W/O ANG PCTRS   SNOMED Code(s): 73960570


   





(5) Colon, diverticulosis


Current Visit: Yes   Status: Acute   Code(s): K57.30 - DVRTCLOS OF LG INT W/O 

PERFORATION OR ABSCESS W/O BLEEDING   SNOMED Code(s): 912421190


   





(6) Gastrointestinal hemorrhage


Current Visit: Yes   Status: Acute   Code(s): K92.2 - GASTROINTESTINAL 

HEMORRHAGE, UNSPECIFIED   SNOMED Code(s): 83484763

## 2018-02-16 VITALS
SYSTOLIC BLOOD PRESSURE: 154 MMHG | DIASTOLIC BLOOD PRESSURE: 71 MMHG | RESPIRATION RATE: 18 BRPM | TEMPERATURE: 98.5 F | HEART RATE: 79 BPM

## 2018-02-16 LAB
ANION GAP SERPL CALC-SCNC: 8 MMOL/L
BASOPHILS # BLD AUTO: 0.1 K/UL (ref 0–0.2)
BASOPHILS NFR BLD AUTO: 1 %
BUN SERPL-SCNC: 4 MG/DL (ref 9–20)
CALCIUM SPEC-MCNC: 8.9 MG/DL (ref 8.4–10.2)
CHLORIDE SERPL-SCNC: 109 MMOL/L (ref 98–107)
CO2 SERPL-SCNC: 24 MMOL/L (ref 22–30)
EOSINOPHIL # BLD AUTO: 0.3 K/UL (ref 0–0.7)
EOSINOPHIL NFR BLD AUTO: 5 %
ERYTHROCYTE [DISTWIDTH] IN BLOOD BY AUTOMATED COUNT: 2.85 M/UL (ref 4.3–5.9)
ERYTHROCYTE [DISTWIDTH] IN BLOOD: 16.5 % (ref 11.5–15.5)
GLUCOSE SERPL-MCNC: 84 MG/DL (ref 74–99)
HCT VFR BLD AUTO: 28 % (ref 39–53)
HGB BLD-MCNC: 8.8 GM/DL (ref 13–17.5)
LYMPHOCYTES # SPEC AUTO: 1.8 K/UL (ref 1–4.8)
LYMPHOCYTES NFR SPEC AUTO: 30 %
MAGNESIUM SPEC-SCNC: 1.8 MG/DL (ref 1.6–2.3)
MCH RBC QN AUTO: 30.9 PG (ref 25–35)
MCHC RBC AUTO-ENTMCNC: 31.5 G/DL (ref 31–37)
MCV RBC AUTO: 98 FL (ref 80–100)
MONOCYTES # BLD AUTO: 0.6 K/UL (ref 0–1)
MONOCYTES NFR BLD AUTO: 10 %
NEUTROPHILS # BLD AUTO: 3 K/UL (ref 1.3–7.7)
NEUTROPHILS NFR BLD AUTO: 51 %
PLATELET # BLD AUTO: 430 K/UL (ref 150–450)
POTASSIUM SERPL-SCNC: 4 MMOL/L (ref 3.5–5.1)
SODIUM SERPL-SCNC: 141 MMOL/L (ref 137–145)
WBC # BLD AUTO: 5.9 K/UL (ref 3.8–10.6)

## 2018-02-16 PROCEDURE — 0DJD8ZZ INSPECTION OF LOWER INTESTINAL TRACT, VIA NATURAL OR ARTIFICIAL OPENING ENDOSCOPIC: ICD-10-PCS

## 2018-02-16 PROCEDURE — 0DJ08ZZ INSPECTION OF UPPER INTESTINAL TRACT, VIA NATURAL OR ARTIFICIAL OPENING ENDOSCOPIC: ICD-10-PCS

## 2018-02-16 RX ADMIN — PANTOPRAZOLE SODIUM SCH MG: 40 INJECTION, POWDER, FOR SOLUTION INTRAVENOUS at 08:28

## 2018-02-16 RX ADMIN — ATORVASTATIN CALCIUM SCH: 80 TABLET, FILM COATED ORAL at 08:28

## 2018-02-16 RX ADMIN — POTASSIUM CHLORIDE SCH: 14.9 INJECTION, SOLUTION INTRAVENOUS at 14:44

## 2018-02-16 RX ADMIN — CEFAZOLIN SCH: 330 INJECTION, POWDER, FOR SOLUTION INTRAMUSCULAR; INTRAVENOUS at 11:21

## 2018-02-16 RX ADMIN — Medication SCH: at 12:26

## 2018-02-16 RX ADMIN — CEFAZOLIN SCH MLS/HR: 330 INJECTION, POWDER, FOR SOLUTION INTRAMUSCULAR; INTRAVENOUS at 00:30

## 2018-02-16 RX ADMIN — METOPROLOL TARTRATE SCH MG: 25 TABLET, FILM COATED ORAL at 08:28

## 2018-02-16 NOTE — P.PN
Progress Note - Text


Progress Note Date: 02/16/18





EGD negative for ulcer or bleeding. Colon negative. May start regular diet. DC 

home.

## 2018-02-16 NOTE — P.PN
Subjective


Progress Note Date: 02/16/18





Mr. Franco is seen and examined today awaiting his colonoscopy. Hgb today is 

8.8. He completed his prep and states there was no evidence of greg blood. He 

denies chest pain, shortness of breath, dizziness or palpitations. Only 

complaint is of lower abdominal cramping secondary to his GI prep. Telemetry 

tracings have been unremarkable. Vital signs stable.    





Objective





- Vital Signs


Vital signs: 


 Vital Signs











Temp  98.4 F   02/16/18 07:20


 


Pulse  81   02/16/18 07:20


 


Resp  16   02/16/18 07:20


 


BP  127/78   02/16/18 07:20


 


Pulse Ox  100   02/16/18 07:20








 Intake & Output











 02/15/18 02/16/18 02/16/18





 18:59 06:59 18:59


 


Intake Total 1550 1305 0


 


Balance 1550 1305 0


 


Weight  73.2 kg 


 


Intake:   


 


  Intake, IV Titration  825 





  Amount   


 


    Sodium Chloride 0.9% 1,  825 





    000 ml @ 100 mls/hr IV .   





    Q10H [facility] Rx#:[id number]   


 


  Oral 1240 480 0


 


  Blood Product 310  


 


    Rc As-1  Unit 310  





    [id number]   


 


Other:   


 


  Voiding Method Toilet  Toilet


 


  # Voids 4 3 


 


  # Bowel Movements  2 














- Exam





Blood pressure 127/78 heart rate 81 afebrile


GENERAL: Well-appearing, well-nourished and in no acute distress.


NECK: Supple without JVD or thyromegaly.


LUNGS: Breath sounds clear to auscultation bilaterally. Respiration equal and 

unlabored.  No wheezes, rales or rhonchi.


HEART: Regular rate and rhythm without murmurs, rubs or gallops. S1 and S2 

heard.


EXTREMITIES: Normal range of motion, no edema.  No clubbing or cyanosis. 

Peripheral pulses intact and strong.





- Labs


CBC & Chem 7: 


 02/16/18 06:44





 02/16/18 06:44


Labs: 


 Abnormal Lab Results - Last 24 Hours (Table)











  02/13/18 02/15/18 02/16/18 Range/Units





  10:50 17:56 06:44 


 


RBC   2.96 L  2.85 L  (4.30-5.90)  m/uL


 


Hgb   9.4 L D  8.8 L  (13.0-17.5)  gm/dL


 


Hct   29.9 L  28.0 L  (39.0-53.0)  %


 


MCV   101.2 H   (80.0-100.0)  fL


 


RDW   15.7 H  16.5 H  (11.5-15.5)  %


 


Chloride     ()  mmol/L


 


BUN     (9-20)  mg/dL


 


Crossmatch  See Detail    














  02/16/18 Range/Units





  06:44 


 


RBC   (4.30-5.90)  m/uL


 


Hgb   (13.0-17.5)  gm/dL


 


Hct   (39.0-53.0)  %


 


MCV   (80.0-100.0)  fL


 


RDW   (11.5-15.5)  %


 


Chloride  109 H  ()  mmol/L


 


BUN  4 L  (9-20)  mg/dL


 


Crossmatch   














Assessment and Plan


Assessment: 





ASSESSMENT


1. Coronary artery disease with recent stent placement


2. Acute blood loss anemia


3. Former tobacco use, quit 10/2017


4. Chronic daily alcohol use








PLAN


With no further evidence of GI bleeding and stable hemoglobin we recommend he 

restart his aspirin and plavix as soon as his colonoscopy is complete if ok per 

surgery. Follow-up with Dr. AMRIK Sanford in 1 week. 





Nurse Practitioner note has been reviewed, I agree with a documented findings 

and plan of care.  Patient was seen and examined.

## 2018-02-19 NOTE — DS
DISCHARGE SUMMARY



DATE OF ADMISSION:

February 13, 2018.



DATE OF DISCHARGE:

February 16, 2018.



FINAL DIAGNOSES:

1. Acute severe gastrointestinal bleed.  The patient being on aspirin, Plavix and also

    chronic alcoholism exact source of the gastrointestinal tract not detected.

2. Acute severe blood loss anemia symptomatic, patient requiring blood transfusion.

3. Coronary artery disease with stent to the RCA and diffuse disease in the LAD.



HOSPITAL COURSE:

This pleasant 57-year-old patient who in October of 2017, had ST-elevation myocardial

infarction with stent to the RCA.  The patient did come back for cardiac cath in

December done by Dr. KIKA Sanford found diffuse LAD disease.  No intervention was done.

Repeat Echocardiogram showed EF had come up to 50-55%.  The patient presented 4 days of

multiple bloody stools, dizzy, lightheaded, hemoglobin did drop down to 7.  The patient

symptomatic.  The patient was transfused a unit of blood.  Hemoglobin did come up to

8.8.  The patient presently wanted surgery to do the procedure.  The patient did have a

EGD colonoscopy by Dr. Desai.  All came back to be unremarkable.  The patient had

no further episodes.



PHYSICAL EXAMINATION:

On exam, lungs fair entry.  Cardiovascular 1st and second sounds normal.



Patient told to discontinue drinking.  Per Cardiology patient is okay to go back on the

aspirin and Plavix aspirin.



CONSULTATION:

Dr. Larios from Cardiology, Dr. Spencer from Gastroenterology, Dr. Bhatt from

Critical Care and Dr. Desai from General surgery who did carry out EGD and a

colonoscopy.



DISCHARGE MEDICATIONS:

1. Aspirin 81 mg a day.

2. Vitamin D3 2000 units p.o. daily.

3. Nitrostat 0.4 sublingual q.5 p.r.n.

4. Lipitor 80 mg daily.

5. Lopressor 25 p.o. daily.

6. Plavix 75 mg p.o. daily.

7. Prilosec 20 mg p.o. with breakfast.



Follow up with Dr. KIKA Sanford in 1 week.  Follow up with Dr. Reddy Chung 2/21/2018.

CBC in 3 days.



On examination lungs are clear.  Cardiovascular 1st and 2nd sounds normal.



Discussion and discharge planning more than 35 minutes.





MMODL / IJN: [id number] / Job#: [id number]

## 2018-03-01 NOTE — P.PCN
Date of Procedure: 02/16/18


Description of Procedure: 








PREOPERATIVE DIAGNOSIS:


History of gastrointestinal bleed.


History of gastric ulcers.





POSTOPERATIVE DIAGNOSIS:


History of gastrointestinal bleed.


History of gastric ulcers.





OPERATION:


Esophagogastroduodenoscopy.





SURGEON: Eliz Desai MD





ANESTHESIA: MAC.





INDICATIONS:


The patient is a 57-year-old female who presents with a history of reflux 

disease. Benefits and risks of the procedure were described. Informed consent 

was obtained.





DESCRIPTION:


The patient was brought into the endoscopy suite and laid in the left lateral 

decubitus position. An Olympus gastroscope was passed along the posterior 

oropharynx down to the distal esophagus where the squamocolumnar junction was 

unremarkable.  The stomach was entered and no bile reflux was found.  

Additional findings are listed below. The first through third portion of the 

duodenum was examined and unremarkable. Retroflexion of the scope confirmed  

Hill grade 2 lower esophageal valve. The squamocolumnar junction demostrated 

early and acute LA grade A erosive esophagitis. The stomach was desufflated. 

The patient tolerated the procedure well.





FINDINGS:


Squamocolumnar junction unremarkable.


Hill grade 2 lower esophageal valve.


LA grade A erosive esophagitis.


No active duodenitis.


No duodenal ulcers.


No gastric ulcer.





RECOMMENDATIONS:


Continue medical therapy.


Upper endoscopy as needed.

## 2018-03-01 NOTE — P.PCN
Date of Procedure: 02/16/18


Description of Procedure: 











PREOPERATIVE DIAGNOSIS:


History of anemia.


Gastrointestinal hemorrhage.





POSTOPERATIVE DIAGNOSIS:


History of anemia.


Gastrointestinal hemorrhage.





OPERATION:


Colonoscopy to the sigmoid colon with flexible sigmoidoscopy.





SURGEON: Eliz Desai MD.





ANESTHESIA: MAC.





INDICATIONS:


The patient is a 57 year-old male who presents with anemia gastrointestinal 

hemorrhage.  Benefits and risks were described and informed consent was 

obtained.





DESCRIPTION OF PROCEDURE:


The patient had undergone bowel prep. He had been brought into the endoscopy 

room and laid in the left lateral decubitus position. After adequate 

intravenous sedation, the rectum was examined with 2% lidocaine jelly. No 

external hemorrhoids were encountered. The rectal tone was within normal 

limits. No lesions were palpated in the rectal vault.  The prostate was smooth 

without nodularity.  An Olympus colonoscope was advanced along the rectum to  

the sigmoid colon without any evidence of bleeding.  Sigmoid diverticulosis 

identified.  No polyps were identified at lungs portion of the colon.  Despite 

multiple maneuvers including attempted advancement the scope, the scope could 

not safely advance without potential harm to the patient.  As result, the 

colonoscopy was terminated to the sigmoid colon.  The colon was desufflated.  

The colonoscope was removed.  The patient tolerated the procedure well.





FINDINGS:


No evidence of active bleeding.


Scattered diverticulosis of the sigmoid colon.


Severely tortuous sigmoid colon with increased risk of trauma from the 

colonoscope hence procedure terminated as flexible sigmoidoscopy.





RECOMMENDATIONS:


Lower endoscopy as needed.

## 2018-06-14 ENCOUNTER — HOSPITAL ENCOUNTER (INPATIENT)
Dept: HOSPITAL 47 - EC | Age: 58
LOS: 22 days | Discharge: HOME HEALTH | DRG: 329 | End: 2018-07-06
Payer: COMMERCIAL

## 2018-06-14 VITALS — BODY MASS INDEX: 22 KG/M2

## 2018-06-14 DIAGNOSIS — E88.09: ICD-10-CM

## 2018-06-14 DIAGNOSIS — D50.9: ICD-10-CM

## 2018-06-14 DIAGNOSIS — Z79.899: ICD-10-CM

## 2018-06-14 DIAGNOSIS — E78.5: ICD-10-CM

## 2018-06-14 DIAGNOSIS — Z95.5: ICD-10-CM

## 2018-06-14 DIAGNOSIS — I25.2: ICD-10-CM

## 2018-06-14 DIAGNOSIS — Z79.02: ICD-10-CM

## 2018-06-14 DIAGNOSIS — I45.10: ICD-10-CM

## 2018-06-14 DIAGNOSIS — E87.5: ICD-10-CM

## 2018-06-14 DIAGNOSIS — N17.0: ICD-10-CM

## 2018-06-14 DIAGNOSIS — Z87.891: ICD-10-CM

## 2018-06-14 DIAGNOSIS — K21.9: ICD-10-CM

## 2018-06-14 DIAGNOSIS — J18.9: ICD-10-CM

## 2018-06-14 DIAGNOSIS — Z79.82: ICD-10-CM

## 2018-06-14 DIAGNOSIS — Z80.0: ICD-10-CM

## 2018-06-14 DIAGNOSIS — E86.0: ICD-10-CM

## 2018-06-14 DIAGNOSIS — I25.5: ICD-10-CM

## 2018-06-14 DIAGNOSIS — Z82.3: ICD-10-CM

## 2018-06-14 DIAGNOSIS — Z87.442: ICD-10-CM

## 2018-06-14 DIAGNOSIS — Z85.828: ICD-10-CM

## 2018-06-14 DIAGNOSIS — I08.3: ICD-10-CM

## 2018-06-14 DIAGNOSIS — I11.9: ICD-10-CM

## 2018-06-14 DIAGNOSIS — E87.6: ICD-10-CM

## 2018-06-14 DIAGNOSIS — E83.52: ICD-10-CM

## 2018-06-14 DIAGNOSIS — B37.7: ICD-10-CM

## 2018-06-14 DIAGNOSIS — Z53.31: ICD-10-CM

## 2018-06-14 DIAGNOSIS — K57.20: Primary | ICD-10-CM

## 2018-06-14 DIAGNOSIS — R21: ICD-10-CM

## 2018-06-14 DIAGNOSIS — I25.10: ICD-10-CM

## 2018-06-14 LAB
ALBUMIN SERPL-MCNC: 4.5 G/DL (ref 3.5–5)
ALP SERPL-CCNC: 94 U/L (ref 38–126)
ALT SERPL-CCNC: 48 U/L (ref 21–72)
AMYLASE SERPL-CCNC: 47 U/L (ref 30–110)
ANION GAP SERPL CALC-SCNC: 24 MMOL/L
APTT BLD: 21.1 SEC (ref 22–30)
AST SERPL-CCNC: 42 U/L (ref 17–59)
BASOPHILS # BLD AUTO: 0.1 K/UL (ref 0–0.2)
BASOPHILS NFR BLD AUTO: 0 %
BUN SERPL-SCNC: 16 MG/DL (ref 9–20)
CALCIUM SPEC-MCNC: 10.6 MG/DL (ref 8.4–10.2)
CHLORIDE SERPL-SCNC: 93 MMOL/L (ref 98–107)
CO2 SERPL-SCNC: 21 MMOL/L (ref 22–30)
EOSINOPHIL # BLD AUTO: 0.1 K/UL (ref 0–0.7)
EOSINOPHIL NFR BLD AUTO: 1 %
ERYTHROCYTE [DISTWIDTH] IN BLOOD BY AUTOMATED COUNT: 5.42 M/UL (ref 4.3–5.9)
ERYTHROCYTE [DISTWIDTH] IN BLOOD: 13.5 % (ref 11.5–15.5)
GLUCOSE SERPL-MCNC: 205 MG/DL (ref 74–99)
HCT VFR BLD AUTO: 50.7 % (ref 39–53)
HGB BLD-MCNC: 16.2 GM/DL (ref 13–17.5)
INR PPP: 0.9 (ref ?–1.2)
KETONES UR QL STRIP.AUTO: (no result)
LIPASE SERPL-CCNC: 58 U/L (ref 23–300)
LYMPHOCYTES # SPEC AUTO: 0.7 K/UL (ref 1–4.8)
LYMPHOCYTES NFR SPEC AUTO: 6 %
MCH RBC QN AUTO: 30 PG (ref 25–35)
MCHC RBC AUTO-ENTMCNC: 32 G/DL (ref 31–37)
MCV RBC AUTO: 93.6 FL (ref 80–100)
MONOCYTES # BLD AUTO: 0.6 K/UL (ref 0–1)
MONOCYTES NFR BLD AUTO: 5 %
NEUTROPHILS # BLD AUTO: 10.3 K/UL (ref 1.3–7.7)
NEUTROPHILS NFR BLD AUTO: 87 %
PH UR: 5.5 [PH] (ref 5–8)
PLATELET # BLD AUTO: 406 K/UL (ref 150–450)
POTASSIUM SERPL-SCNC: 5.2 MMOL/L (ref 3.5–5.1)
PROT SERPL-MCNC: 8 G/DL (ref 6.3–8.2)
PROT UR QL: (no result)
PT BLD: 9.4 SEC (ref 9–12)
RBC UR QL: 1 /HPF (ref 0–5)
SODIUM SERPL-SCNC: 138 MMOL/L (ref 137–145)
SP GR UR: >1.05 (ref 1–1.03)
UROBILINOGEN UR QL STRIP: 2 MG/DL (ref ?–2)
WBC # BLD AUTO: 11.8 K/UL (ref 3.8–10.6)
WBC #/AREA URNS HPF: 2 /HPF (ref 0–5)

## 2018-06-14 PROCEDURE — 83540 ASSAY OF IRON: CPT

## 2018-06-14 PROCEDURE — 74176 CT ABD & PELVIS W/O CONTRAST: CPT

## 2018-06-14 PROCEDURE — 86301 IMMUNOASSAY TUMOR CA 19-9: CPT

## 2018-06-14 PROCEDURE — 74019 RADEX ABDOMEN 2 VIEWS: CPT

## 2018-06-14 PROCEDURE — 86901 BLOOD TYPING SEROLOGIC RH(D): CPT

## 2018-06-14 PROCEDURE — 82105 ALPHA-FETOPROTEIN SERUM: CPT

## 2018-06-14 PROCEDURE — 87324 CLOSTRIDIUM AG IA: CPT

## 2018-06-14 PROCEDURE — 85610 PROTHROMBIN TIME: CPT

## 2018-06-14 PROCEDURE — 87086 URINE CULTURE/COLONY COUNT: CPT

## 2018-06-14 PROCEDURE — 81003 URINALYSIS AUTO W/O SCOPE: CPT

## 2018-06-14 PROCEDURE — 82728 ASSAY OF FERRITIN: CPT

## 2018-06-14 PROCEDURE — 83735 ASSAY OF MAGNESIUM: CPT

## 2018-06-14 PROCEDURE — 85730 THROMBOPLASTIN TIME PARTIAL: CPT

## 2018-06-14 PROCEDURE — 82330 ASSAY OF CALCIUM: CPT

## 2018-06-14 PROCEDURE — 80053 COMPREHEN METABOLIC PANEL: CPT

## 2018-06-14 PROCEDURE — 87070 CULTURE OTHR SPECIMN AEROBIC: CPT

## 2018-06-14 PROCEDURE — 71046 X-RAY EXAM CHEST 2 VIEWS: CPT

## 2018-06-14 PROCEDURE — 81001 URINALYSIS AUTO W/SCOPE: CPT

## 2018-06-14 PROCEDURE — 83550 IRON BINDING TEST: CPT

## 2018-06-14 PROCEDURE — 83605 ASSAY OF LACTIC ACID: CPT

## 2018-06-14 PROCEDURE — 99291 CRITICAL CARE FIRST HOUR: CPT

## 2018-06-14 PROCEDURE — 93005 ELECTROCARDIOGRAM TRACING: CPT

## 2018-06-14 PROCEDURE — 82150 ASSAY OF AMYLASE: CPT

## 2018-06-14 PROCEDURE — 96374 THER/PROPH/DIAG INJ IV PUSH: CPT

## 2018-06-14 PROCEDURE — 74177 CT ABD & PELVIS W/CONTRAST: CPT

## 2018-06-14 PROCEDURE — 87103 BLOOD FUNGUS CULTURE: CPT

## 2018-06-14 PROCEDURE — 88307 TISSUE EXAM BY PATHOLOGIST: CPT

## 2018-06-14 PROCEDURE — 84100 ASSAY OF PHOSPHORUS: CPT

## 2018-06-14 PROCEDURE — 85025 COMPLETE CBC W/AUTO DIFF WBC: CPT

## 2018-06-14 PROCEDURE — 36569 INSJ PICC 5 YR+ W/O IMAGING: CPT

## 2018-06-14 PROCEDURE — 86850 RBC ANTIBODY SCREEN: CPT

## 2018-06-14 PROCEDURE — 82378 CARCINOEMBRYONIC ANTIGEN: CPT

## 2018-06-14 PROCEDURE — 83690 ASSAY OF LIPASE: CPT

## 2018-06-14 PROCEDURE — 71045 X-RAY EXAM CHEST 1 VIEW: CPT

## 2018-06-14 PROCEDURE — 94760 N-INVAS EAR/PLS OXIMETRY 1: CPT

## 2018-06-14 PROCEDURE — 84132 ASSAY OF SERUM POTASSIUM: CPT

## 2018-06-14 PROCEDURE — 96375 TX/PRO/DX INJ NEW DRUG ADDON: CPT

## 2018-06-14 PROCEDURE — 93970 EXTREMITY STUDY: CPT

## 2018-06-14 PROCEDURE — 86900 BLOOD TYPING SEROLOGIC ABO: CPT

## 2018-06-14 PROCEDURE — 84478 ASSAY OF TRIGLYCERIDES: CPT

## 2018-06-14 PROCEDURE — 80048 BASIC METABOLIC PNL TOTAL CA: CPT

## 2018-06-14 PROCEDURE — 76937 US GUIDE VASCULAR ACCESS: CPT

## 2018-06-14 PROCEDURE — 77001 FLUOROGUIDE FOR VEIN DEVICE: CPT

## 2018-06-14 PROCEDURE — 87040 BLOOD CULTURE FOR BACTERIA: CPT

## 2018-06-14 PROCEDURE — 36415 COLL VENOUS BLD VENIPUNCTURE: CPT

## 2018-06-14 RX ADMIN — METOCLOPRAMIDE PRN MG: 5 INJECTION, SOLUTION INTRAMUSCULAR; INTRAVENOUS at 14:06

## 2018-06-14 RX ADMIN — PANTOPRAZOLE SODIUM SCH MG: 40 INJECTION, POWDER, FOR SOLUTION INTRAVENOUS at 11:15

## 2018-06-14 RX ADMIN — METOCLOPRAMIDE PRN MG: 5 INJECTION, SOLUTION INTRAMUSCULAR; INTRAVENOUS at 18:35

## 2018-06-14 RX ADMIN — ATORVASTATIN CALCIUM SCH MG: 80 TABLET, FILM COATED ORAL at 17:23

## 2018-06-14 RX ADMIN — METOPROLOL TARTRATE SCH MG: 25 TABLET, FILM COATED ORAL at 17:23

## 2018-06-14 RX ADMIN — ASPIRIN 81 MG CHEWABLE TABLET SCH: 81 TABLET CHEWABLE at 20:57

## 2018-06-14 RX ADMIN — MORPHINE SULFATE PRN MG: 2 INJECTION, SOLUTION INTRAMUSCULAR; INTRAVENOUS at 13:58

## 2018-06-14 RX ADMIN — CEFAZOLIN SCH MLS/HR: 330 INJECTION, POWDER, FOR SOLUTION INTRAMUSCULAR; INTRAVENOUS at 09:13

## 2018-06-14 RX ADMIN — IOPAMIDOL PRN ML: 612 INJECTION, SOLUTION INTRAVENOUS at 13:52

## 2018-06-14 RX ADMIN — METRONIDAZOLE SCH MLS/HR: 500 INJECTION, SOLUTION INTRAVENOUS at 23:30

## 2018-06-14 RX ADMIN — METRONIDAZOLE SCH MLS/HR: 500 INJECTION, SOLUTION INTRAVENOUS at 19:53

## 2018-06-14 RX ADMIN — CLOPIDOGREL BISULFATE SCH: 75 TABLET ORAL at 20:57

## 2018-06-14 RX ADMIN — MORPHINE SULFATE PRN MG: 2 INJECTION, SOLUTION INTRAMUSCULAR; INTRAVENOUS at 20:59

## 2018-06-14 RX ADMIN — METRONIDAZOLE SCH MLS/HR: 500 INJECTION, SOLUTION INTRAVENOUS at 11:15

## 2018-06-14 RX ADMIN — CEFAZOLIN SCH: 330 INJECTION, POWDER, FOR SOLUTION INTRAMUSCULAR; INTRAVENOUS at 19:56

## 2018-06-14 RX ADMIN — IOPAMIDOL PRN ML: 612 INJECTION, SOLUTION INTRAVENOUS at 12:56

## 2018-06-14 NOTE — CT
EXAMINATION TYPE: CT abdomen pelvis wo con

 

DATE OF EXAM: 6/14/2018

 

COMPARISON: Today

 

HISTORY: Abdominal distention and pain.

 

CT DLP: 503 mGycm

Automated exposure control for dose reduction was used.

 

TECHNIQUE:  Helical acquisition of images was performed from the lung bases through the pelvis.

 

FINDINGS: 

 

There is minimal subsegmental atelectasis at the lung bases. Liver spleen pancreas appear normal. Pablo
e ducts are not dilated. Gallbladder appears normal.

 

There are multiple dilated fluid-filled loops of small bowel. There is no free air. Small bowel measu
res up to 4.4 cm. There is gas-filled large bowel which shows mild distention in the transverse colon
 and right colon. There is fluid level in the ascending colon. There are multiple enlarged small kylee
l mesenteric lymph nodes that measure up to 1.7 cm x 1 cm. There are multiple sigmoid diverticula. Th
ere is no evidence of diverticulitis. There is mild thickening of the wall of the mid sigmoid colon.

IMPRESSION: 

MARKEDLY DILATED SMALL BOWEL APPEARS INCREASED COMPARED TO EXAM THIS MORNING AT 8:00 AM. SMALL BOWEL 
MEASURES UP TO 3.5 TO 4 CM THIS MORNING AND NOW MEASURES 4.5 CM. THERE ARE LARGE BOWEL FLUID LEVELS I
N THE RIGHT COLON. THIS PATTERN PROBABLY RELATES TO GENERALIZED ILEUS. THERE IS SIGMOID DIVERTICULOSI
S WITHOUT DEFINITE DIVERTICULITIS. I DO NOT SEE ANY EVIDENCE OF MECHANICAL OBSTRUCTION OF THE LARGE B
OWEL.

## 2018-06-14 NOTE — CT
EXAMINATION TYPE: CT abdomen pelvis w con

 

DATE OF EXAM: 6/14/2018

 

COMPARISON: CT abdomen and pelvis August 2, 2014

 

HISTORY: Distention

 

CT DLP: 1553 mGycm, Automated Exposure Control for Dose Reduction was Utilized.

 

CONTRAST: 

CT scan of the abdomen and pelvis is performed without oral but with IV Contrast, patient injected wi
th 80 mL of Isovue 300.

 

FINDINGS:

 

LUNG BASES: Dependent atelectasis in both bases is present. Coronary artery calcification and/or sten
ts is present.

 

LIVER/GB:   No significant abnormality is appreciated.

 

PANCREAS:  No significant abnormality is seen.

 

SPLEEN:  No significant abnormality is seen.

 

ADRENALS:  No significant abnormality is seen.

 

KIDNEYS:  No significant abnormality is seen.

 

BOWEL: There is slightly prominent stomach with air-fluid level. There is no suspicious dilatation of
 duodenal sweep. There are prominent and dilated small bowel loops throughout the abdomen and pelvis 
with multiple air-fluid levels. Small bowel loops are dilated up to 4.0 cm for reference left anterio
r pelvis axial image 74. There are air-fluid levels in prominent fluid-filled colon up through the le
ft and sigmoid colonic junction. There is abrupt short segment narrowing in the proximal sigmoid colo
n axial image 84 in the left anterior pelvis with then short segment of fluid-filled mid sigmoid colo
n contains diverticula near axial image 84 in than just distal to this there is more moderate irregul
ar wall thickening with diverticula distal sigmoid colon of the right pelvis. There is perhaps minima
l adjacent fat stranding. Distal to this fecal material is seen in nondistended distal sigmoid colon 
and rectum.

 

PROSTATE/SEMINAL VESICLES: Slight asymmetric bulging of the right prostate peripheral margin is seen 
without enhancing nodule.

 

LYMPH NODES:  No greater than 1cm abdominal or pelvic lymph nodes are appreciated.

 

OSSEOUS STRUCTURES: Dextroconvex scoliosis of lumbar spine is present. There is moderate joint space 
loss in both hips. There is disc space narrowing and vacuum disc phenomenon at lumbosacral junction. 
Multilevel facet arthropathy lower lumbar spine is present.

 

OTHER: There is moderate calcified plaque of the the infrarenal abdominal aorta extending into branch
 vessels. Small fat-containing bilateral inguinal hernias are redemonstrated.

 

IMPRESSION:

1. Possible mild acute diverticulitis in the mid to distal sigmoid colon. This is causing partial bow
el obstruction involving small and large bowel proximal to this. There are 2 suspicious areas of conc
segundo in the sigmoid colon, differential includes spasm, stricture, and neoplasm. Colonoscopy follow up
 after treatment is advised to assess both levels.

## 2018-06-14 NOTE — CT
EXAMINATION TYPE: CT abdomen pelvis wo con

 

DATE OF EXAM: 6/14/2018

 

COMPARISON: 6/14/2018

 

HISTORY: Abdominal distention

 

CT DLP: 396.9 mGycm

Automated exposure control for dose reduction was used.

 

TECHNIQUE:  Helical acquisition of images was performed from the lung bases through the pelvis.

 

FINDINGS: Lack of intravenous contrast limits evaluation of the solid viscera.

 

LUNG BASES: Redemonstration of bibasilar subsegmental atelectasis.

 

LIVER/GB: New layering high density within the gallbladder represents vicarious excretion of contrast
 from the prior recent exam earlier on the same day.

 

PANCREAS: No significant abnormality is seen.

 

SPLEEN: No significant abnormality is seen.

 

ADRENALS: Very mild thickening without nodularity of the adrenal gland suggests mild underlying adren
al gland hyperplasia.

 

KIDNEYS: Residual contrast is seen excreting within the pelvic calyceal system from CT earlier on the
 same date.

 

FREE AIR:  No free air is visualized

 

URINARY BLADDER:  No significant abnormality is seen.

 

OSSEOUS STRUCTURES:  Dextroconvex scoliosis of the lumbar spine is again seen in combination with deg
enerative changes of the thoracolumbar spine and femoral acetabular joints. No suspicious osseous les
ions are seen. Probable bone islands are noted within both femora.

 

BOWEL AND ADENOPATHY:  The previously seen mucosal thickening is less conspicuous without intravenous
 contrast of the sigmoid colon however bowel wall thickening, fascial thickening and pericolonic infl
ammatory fat stranding and a short segment within the sigmoid colon on series 3 image 75 surrounds mu
ltiple diverticula again compatible with acute diverticulitis. Proximal to this the previously seen a
keith of focal stricturing on the exam earlier the same day is less conspicuous, however still narrowed
 in comparison to the caliber of the more proximal colon. There is persistent dilatation of the small
 bowel with numerous air-fluid levels throughout. In the same region as measured on the prior exam th
e small bowel measures up to 4.5 cm and previously measured up to 3.7 cm. Area of luminal narrowing w
ithin the small bowel in the right lower quadrant near the terminal ileum is more pronounced currentl
y than on the prior and could relate to peristalsis. Multiple prominent central abdominal lymph nodes
 are seen on axial series 3 images 50 through 54. The largest lymph node measures 1.6 cm on series 6 
image 53.

 

IMPRESSION: 

 

1. REDEMONSTRATION OF FINDINGS COMPATIBLE WITH ACUTE SIGMOID DIVERTICULITIS, HOWEVER THERE REMAINS CO
NCERN FOR UNIFOCAL OR MULTIFOCAL COLONIC NEOPLASM AS MULTIPLE PROMINENT AND FEW ENLARGED MESENTERIC L
YMPH NODES ARE ALSO SEEN. HOWEVER THESE COULD BE REACTIVE AND THEREFORE COLONOSCOPY IS RECOMMENDED AF
TER TREATMENT.

 

2. PROGRESSIVE DILATATION OF THE SMALL BOWEL IN COMPARISON TO THE EXAM EARLIER ON THE SAME DAY NOW ME
ASURING UP TO 4.5 CM AND PREVIOUSLY MEASURING UP TO 3.7 CM, HOWEVER NO FOCAL TRANSITION POINT IS SEEN
 AND FLUID IS NOTED WITHIN THE COLON SUGGESTING PROGRESSIVE REACTIVE ILEUS. FOLLOW-UP ABDOMINAL RADIO
GRAPHS ARE RECOMMENDED TO ENSURE CONTRAST EXTENDS INTO THE COLON.

## 2018-06-14 NOTE — ED
General Adult HPI





- General


Chief complaint: Abdominal Pain


Stated complaint: Abd pain


Time Seen by Provider: 06/14/18 07:00


Source: patient, family, RN notes reviewed


Mode of arrival: wheelchair


Limitations: no limitations





- History of Present Illness


Initial comments: 





Patient is a pleasant 58-year-old male presenting to the emergency Department 

with abdominal discomfort.  Symptoms started 2 days ago and gradually 

increased.  Symptoms have been worse last night and patient did not sleep well.

  Symptoms have been waxing and waning.  Discomfort is moderate at this time.  

Discomfort is somewhat diffuse however more in the lower abdomen.  Abdomen does 

feel distended.  Patient has had nausea.  Patient has alternated between some 

constipation and diarrhea.  No fevers.  No history of similar symptoms 

previously.





- Related Data


 Home Medications











 Medication  Instructions  Recorded  Confirmed


 


Aspirin 81 mg PO QAM 10/23/17 06/14/18


 


Cholecalciferol (Vitamin D3) 2,000 unit PO QAM 10/23/17 06/14/18





[Vitamin D3]   


 


Atorvastatin [Lipitor] 80 mg PO QAM 12/08/17 06/14/18


 


Metoprolol Tartrate [Lopressor] 25 mg PO QAM 12/08/17 06/14/18


 


Clopidogrel [Plavix] 75 mg PO QAM 02/13/18 06/14/18








 Previous Rx's











 Medication  Instructions  Recorded


 


Nitroglycerin Sl Tabs [Nitrostat] 0.4 mg SUBLINGUAL Q5M PRN #25 tab 10/25/17


 


Omeprazole [PriLOSEC] 20 mg PO AC-BRKFST #30 cap 02/16/18











 Allergies











Allergy/AdvReac Type Severity Reaction Status Date / Time


 


No Known Allergies Allergy   Verified 06/14/18 07:43














Review of Systems


ROS Statement: 


Those systems with pertinent positive or pertinent negative responses have been 

documented in the HPI.





ROS Other: All systems not noted in ROS Statement are negative.


Constitutional: Denies: fever


Eyes: Denies: eye pain


ENT: Denies: ear pain


Respiratory: Denies: cough


Cardiovascular: Denies: chest pain


Endocrine: Denies: fatigue


Gastrointestinal: Reports: abdominal pain, nausea, diarrhea, constipation


Genitourinary: Denies: dysuria


Musculoskeletal: Denies: back pain


Skin: Denies: rash


Neurological: Denies: weakness





Past Medical History


Past Medical History: Coronary Artery Disease (CAD), Cancer, Chest Pain / Angina

, Myocardial Infarction (MI)


Additional Past Medical History / Comment(s): 10/23/17 stemi,  skin cancer 

removal-right eyebrow, kidney stone which pt passed on his own.


Last Myocardial Infarction Date:: 10/23/17


History of Any Multi-Drug Resistant Organisms: None Reported


Past Surgical History: Heart Catheterization With Stent, Tonsillectomy


Additional Past Surgical History / Comment(s): skin cancer removal, 3 stents on 

10/23/17, 12/12/17 cardiac cath with maximizing medical therapy.


Past Anesthesia/Blood Transfusion Reactions: No Reported Reaction


Date of Last Stent Placement:: 10/23/17


Past Psychological History: No Psychological Hx Reported


Smoking Status: Former smoker


Past Alcohol Use History: Daily


Past Drug Use History: None Reported





- Past Family History


  ** Mother


Family Medical History: CVA/TIA





  ** Father


Family Medical History: Cancer


Additional Family Medical History / Comment(s): pancreatic





General Exam


Limitations: no limitations


General appearance: alert, in no apparent distress


Head exam: Present: atraumatic


Eye exam: Present: normal appearance, PERRL


ENT exam: Present: normal oropharynx


Neck exam: Present: normal inspection


Respiratory exam: Present: normal lung sounds bilaterally


Cardiovascular Exam: Present: tachycardia, normal heart sounds


  ** Expanded


Peripheral pulses: 2+: Posterior Tibialis (R), Posterior Tibialis (L)


GI/Abdominal exam: Present: distended, tenderness (Moderate diffuse tenderness)

, hypoactive bowel sounds.  Absent: guarding, rigid, pulsatile mass


Extremities exam: Present: normal inspection.  Absent: pedal edema, calf 

tenderness


Neurological exam: Present: alert


Psychiatric exam: Present: normal affect, normal mood


Skin exam: Present: normal color





Course


 Vital Signs











  06/14/18 06/14/18





  06:04 06:19


 


Temperature 98.1 F 


 


Pulse Rate 141 H 125 H


 


Respiratory 20 16





Rate  


 


Blood Pressure 132/94 151/101


 


O2 Sat by Pulse 95 96





Oximetry  














- Reevaluation(s)


Reevaluation #1: 





06/14/18 08:44


Patient does meet criteria for sepsis diagnosed at 8:44 AM.  Blood culture and 

lactic acid have been ordered.  IV antibiotics will be ordered.





EKG Findings





- EKG Comments:


EKG Findings:: Sinus tachycardia 129.  IL 1:30.  .  .  QTc 477.  

Axis indeterminate.  Right bundle branch block.  No acute ST change.





Medical Decision Making





- Medical Decision Making





Patient reevaluated and only somewhat improved.  Patient and family updated on 

results and plan.  Patient states he did have colonoscopy done around 6 months 

ago with Dr. Flores.  Dr. Martínez has been paged for admission.





- Lab Data


Result diagrams: 


 06/14/18 06:14





 06/14/18 06:14


 Lab Results











  06/14/18 06/14/18 06/14/18 Range/Units





  06:14 06:14 06:14 


 


WBC   11.8 H   (3.8-10.6)  k/uL


 


RBC   5.42   (4.30-5.90)  m/uL


 


Hgb   16.2   (13.0-17.5)  gm/dL


 


Hct   50.7   (39.0-53.0)  %


 


MCV   93.6   (80.0-100.0)  fL


 


MCH   30.0   (25.0-35.0)  pg


 


MCHC   32.0   (31.0-37.0)  g/dL


 


RDW   13.5   (11.5-15.5)  %


 


Plt Count   406   (150-450)  k/uL


 


Neutrophils %   87   %


 


Lymphocytes %   6   %


 


Monocytes %   5   %


 


Eosinophils %   1   %


 


Basophils %   0   %


 


Neutrophils #   10.3 H   (1.3-7.7)  k/uL


 


Lymphocytes #   0.7 L   (1.0-4.8)  k/uL


 


Monocytes #   0.6   (0-1.0)  k/uL


 


Eosinophils #   0.1   (0-0.7)  k/uL


 


Basophils #   0.1   (0-0.2)  k/uL


 


PT    9.4  (9.0-12.0)  sec


 


INR    0.9  (<1.2)  


 


APTT    21.1 L  (22.0-30.0)  sec


 


Sodium  138    (137-145)  mmol/L


 


Potassium  5.2 H    (3.5-5.1)  mmol/L


 


Chloride  93 L    ()  mmol/L


 


Carbon Dioxide  21 L    (22-30)  mmol/L


 


Anion Gap  24    mmol/L


 


BUN  16    (9-20)  mg/dL


 


Creatinine  1.52 H    (0.66-1.25)  mg/dL


 


Est GFR (CKD-EPI)AfAm  57    (>60 ml/min/1.73 sqM)  


 


Est GFR (CKD-EPI)NonAf  49    (>60 ml/min/1.73 sqM)  


 


Glucose  205 H    (74-99)  mg/dL


 


Calcium  10.6 H    (8.4-10.2)  mg/dL


 


Total Bilirubin  0.8    (0.2-1.3)  mg/dL


 


AST  42    (17-59)  U/L


 


ALT  48    (21-72)  U/L


 


Alkaline Phosphatase  94    ()  U/L


 


Total Protein  8.0    (6.3-8.2)  g/dL


 


Albumin  4.5    (3.5-5.0)  g/dL


 


Amylase  47    ()  U/L


 


Lipase  58    ()  U/L














- Radiology Data


Radiology results: report reviewed (Computed tomography scan of the abdomen and 

pelvis shows possible mild diverticulitis mid to distal sigmoid colon.  This 

does appear to be causing partial bowel obstruction.  There is also 2 

suspicious areas and patient will need colonoscopy.)





Critical Care Time


Critical Care Time: Yes


Total Critical Care Time: 32





Disposition


Clinical Impression: 


 Partial bowel obstruction, Diverticulitis





Disposition: ADMITTED AS IP TO THIS [facility]


Condition: Serious


Referrals: 


Reddy Chung MD [Primary Care Provider] - 1-2 days


Decision Time: 08:45

## 2018-06-14 NOTE — P.GSCN
History of Present Illness


Consult date: 06/14/18


Reason for Consult: 





Abdominal pain


History of present illness: 





58-year-old male presented on the day of admission to the emergency room with a 

chief complaint of abdominal pain.  Patient stated it started on Tuesday he 

felt bloated and constipated.  Stated that he did take a fleets enema with a 

small amount of stool no blood noted in the stool.  There was some relief from 

the abdominal distention.    stated the symptoms reoccurred became more 

symptomatic attempted to take another fleets enema with no results.  Stated 

that he came into the emergency room because the abdominal pain was 

intolerable.  Abdomen is distended firm with a lot of pressure feeling.  Stated 

that he could belch but could not pass gas.  Denied any blood in the stool no 

hematemesis no nausea no vomiting.  Denied chest pain dizziness or 

lightheadedness  


Patient had a CAT scan done of the abdomen and pelvis report reviewed showed 

possible mild diverticulitis mid to distal sigmoid colon likely causing partial 

small bowel obstruction. 


 Reviewing computerized record patient did have colonoscopy done February 16th 2018 by Dr. Desai the report indicated  there was no evidence of any active 

bleed scattered diverticulosis of the sigmoid colon severe torturous sigmoid 

colon which limited the colonoscopy procedure terminated as flexible 

sigmoidoscopy


EGD done February 2018 reviewing the report indicated it showed no active 

duodenitis no duodenal ulcer no gastric ulcer LA grade erosive esophagitis





Patient has a past medical history of coronary artery disease prior coronary 

stenting, history of diverticulosis, skin cancer


Past surgical history cardiac catheterization, skin cancer removal, 

tonsillectomy.





Review of Systems





Essentially unremarkable except as mentioned in the present illness





Past Medical History


Past Medical History: Coronary Artery Disease (CAD), Cancer, Chest Pain / Angina

, GI Bleed, Myocardial Infarction (MI)


Additional Past Medical History / Comment(s): 2/13/18 lower GI bleed-unknown 

source, blood loss anemia with transfusion, 10/23/17 stemi, skin cancer removal-

right eyebrow, kidney stone which pt passed on his own.


Last Myocardial Infarction Date:: 10/23/17


History of Any Multi-Drug Resistant Organisms: None Reported


Past Surgical History: Heart Catheterization With Stent, Tonsillectomy


Additional Past Surgical History / Comment(s): 2/2018 EGD/colonoscopy, skin 

cancer removal, 3 stents on 10/23/17, 12/12/17 cardiac cath with maximizing 

medical therapy.


Past Anesthesia/Blood Transfusion Reactions: No Reported Reaction


Date of Last Stent Placement:: 10/23/17


Smoking Status: Former smoker





- Past Family History


  ** Mother


Family Medical History: CVA/TIA





  ** Father


Family Medical History: Cancer


Additional Family Medical History / Comment(s): pancreatic





Medications and Allergies


 Home Medications











 Medication  Instructions  Recorded  Confirmed  Type


 


Aspirin 81 mg PO QAM 10/23/17 06/14/18 History


 


Cholecalciferol (Vitamin D3) 2,000 unit PO QAM 10/23/17 06/14/18 History





[Vitamin D3]    


 


Nitroglycerin Sl Tabs [Nitrostat] 0.4 mg SUBLINGUAL Q5M PRN #25 tab 10/25/17 06/

14/18 Rx


 


Atorvastatin [Lipitor] 80 mg PO QAM 12/08/17 06/14/18 History


 


Metoprolol Tartrate [Lopressor] 25 mg PO QAM 12/08/17 06/14/18 History


 


Clopidogrel [Plavix] 75 mg PO QAM 02/13/18 06/14/18 History


 


Omeprazole [PriLOSEC] 20 mg PO AC-BRKFST #30 cap 02/16/18 06/14/18 Rx











 Allergies











Allergy/AdvReac Type Severity Reaction Status Date / Time


 


No Known Allergies Allergy   Verified 06/14/18 07:43














Surgical - Exam


 Vital Signs











Temp Pulse Resp BP Pulse Ox


 


 98.1 F   141 H  20   132/94   95 


 


 06/14/18 06:04  06/14/18 06:04  06/14/18 06:04  06/14/18 06:04  06/14/18 06:04














GENERAL APPEARANCE: patient is alert, oriented,x 3  in no acute distress.  

Denies any dizziness lightheadedness shortness of breath or chest pain


VITAL SIGNS: Reviewed


HEENT: Head is normocephalic and atraumatic. Pupils are equal and reactive. The 

nares are patent. Oropharynx is clear without lesions.


NECK: Supple without lymphadenopathy. Traches midline.


HEART: S1, S2. Regular rate and rhythm.  No murmur noted


LUNGS: No crackles or wheezes are heard.  On room air


ABDOMEN: Diffuse tenderness firm distended few hypoactive bowel tones reports 

no nausea vomiting  No peritoneal signs. No palpable organomegaly or masses.


EXTREMITIES: Normal skin color and turgor. No cyanosis, rash, ulceration, 

clubbing or edema. Radial pedal pulses are 2/4 bilaterally.


NEUROLOGICAL: No focal deficits. Strength and sensation are grossly intact.








Results





- Labs





 06/14/18 06:14





 06/14/18 06:14


 Abnormal Lab Results - Last 24 Hours (Table)











  06/14/18 06/14/18 06/14/18 Range/Units





  06:14 06:14 06:14 


 


WBC   11.8 H   (3.8-10.6)  k/uL


 


Neutrophils #   10.3 H   (1.3-7.7)  k/uL


 


Lymphocytes #   0.7 L   (1.0-4.8)  k/uL


 


APTT    21.1 L  (22.0-30.0)  sec


 


Potassium  5.2 H    (3.5-5.1)  mmol/L


 


Chloride  93 L    ()  mmol/L


 


Carbon Dioxide  21 L    (22-30)  mmol/L


 


Creatinine  1.52 H    (0.66-1.25)  mg/dL


 


Glucose  205 H    (74-99)  mg/dL


 


Calcium  10.6 H    (8.4-10.2)  mg/dL








 Diabetes panel











  06/14/18 Range/Units





  06:14 


 


Sodium  138  (137-145)  mmol/L


 


Potassium  5.2 H  (3.5-5.1)  mmol/L


 


Chloride  93 L  ()  mmol/L


 


Carbon Dioxide  21 L  (22-30)  mmol/L


 


BUN  16  (9-20)  mg/dL


 


Creatinine  1.52 H  (0.66-1.25)  mg/dL


 


Glucose  205 H  (74-99)  mg/dL


 


Calcium  10.6 H  (8.4-10.2)  mg/dL


 


AST  42  (17-59)  U/L


 


ALT  48  (21-72)  U/L


 


Alkaline Phosphatase  94  ()  U/L


 


Total Protein  8.0  (6.3-8.2)  g/dL


 


Albumin  4.5  (3.5-5.0)  g/dL








 Calcium panel











  06/14/18 Range/Units





  06:14 


 


Calcium  10.6 H  (8.4-10.2)  mg/dL


 


Albumin  4.5  (3.5-5.0)  g/dL








 Pituitary panel











  06/14/18 Range/Units





  06:14 


 


Sodium  138  (137-145)  mmol/L


 


Potassium  5.2 H  (3.5-5.1)  mmol/L


 


Chloride  93 L  ()  mmol/L


 


Carbon Dioxide  21 L  (22-30)  mmol/L


 


BUN  16  (9-20)  mg/dL


 


Creatinine  1.52 H  (0.66-1.25)  mg/dL


 


Glucose  205 H  (74-99)  mg/dL


 


Calcium  10.6 H  (8.4-10.2)  mg/dL








 Adrenal panel











  06/14/18 Range/Units





  06:14 


 


Sodium  138  (137-145)  mmol/L


 


Potassium  5.2 H  (3.5-5.1)  mmol/L


 


Chloride  93 L  ()  mmol/L


 


Carbon Dioxide  21 L  (22-30)  mmol/L


 


BUN  16  (9-20)  mg/dL


 


Creatinine  1.52 H  (0.66-1.25)  mg/dL


 


Glucose  205 H  (74-99)  mg/dL


 


Calcium  10.6 H  (8.4-10.2)  mg/dL


 


Total Bilirubin  0.8  (0.2-1.3)  mg/dL


 


AST  42  (17-59)  U/L


 


ALT  48  (21-72)  U/L


 


Alkaline Phosphatase  94  ()  U/L


 


Total Protein  8.0  (6.3-8.2)  g/dL


 


Albumin  4.5  (3.5-5.0)  g/dL














Assessment and Plan


Assessment: 





Impression


Present on admission abdominal pain nausea vomiting suspect due to partial 

bowel obstruction involving small and large bowel


CAT scan abdomen and pelvis obtained on admission report indicates mild acute 

diverticulitis mid to distal sigmoid colon causing partial small bowel 

obstruction involving small large bowel


Known coronary artery disease with prior coronary stenting


History of tobacco abuse recently quit


A recent colonoscopy to the sigmoid colon with flexible sigmoidoscopy done 

February 2018 showed scattered diverticulosis of the sigmoid colon with severe 

torturous sigmoid colon with no evidence of an active bleed as part of a workup 

for acute blood loss anemia


A recent EGD February 2018 no evidence of an active duodenitis, no duodenal 

ulcer: Gastric ulcer








Plan


Computed tomography scan abdomen and pelvis now follow up on results


Bowel rest


IV fluid for hydration


Monitor labs


DVT and GI prophylaxis


Pain control


Further surgical recommendations pending will follow with you











Surgical consultation note dictated for Dr. tasha flores on behalf of Dr. Desai


The above impression and plan of care have been discussed and directed by 

signing physician. Marlee Simon nurse practitioner acting as scribe for signing 

physician.

## 2018-06-14 NOTE — HP
HISTORY AND PHYSICAL



ADMISSION:

June 14, 2018.



PRESENTING COMPLAINT:

Abdominal pain.



HISTORY OF PRESENTING COMPLAINT:

This is a 58-year-old patient who was here in February of this year, admitted to the

hospital with acute GI bleed.  The patient does drink about 6 beers a day.  The patient

has known coronary artery disease with stent to the RCA and diffuse disease in the LAD.

The patient continues to drink about 6 beers a day.  On the last admission, patient did

have a EGD by Dr. Desai that was unremarkable and colonoscopy had to be abandoned

with simply a sigmoidoscopy because the colon was very tortuous and further colon could

not be negotiated.  The patient now presents with increasing abdominal pain for 2 days,

distended, nausea.  The patient gave himself a Fleet's enema and had a small bowel

movement.  The patient still has significant distention and presented for the same.

Patient continues to drink about 6 beers a day.  The patient CT scan shows evidence of

possible bowel obstruction, possible diverticulitis, multiple air-fluid levels.



REVIEW OF SYSTEMS:

Constitutional:  Tired.  HEENT none.  Respiratory none.  Cardiovascular none.

Gastrointestinal as above.  Genitourinary none.  Musculoskeletal none.  Dermatological,

hematologic, lymphatics none.  Psychiatry none.  Neurological none.



PAST MEDICAL HISTORY:

Coronary artery disease, GI bleed, source unknown.  Significant diverticulosis.  Skin

cancer removed over right eyebrow.  Kidney stone which the patient passed on his own.



PAST SURGICAL HISTORY:

Cardiac cath with stent.  The patient did have 3 stents placed on October 2017.



SOCIAL HISTORY:

.  The patient smoked for 20 years.  Stopped in September of 2017. Continues to

drink 5-6 beers a day.



FAMILY HISTORY:

Pancreatic cancer and stroke.



HOME MEDICATIONS:

1. Prilosec 20 mg a day.

2. Nitrostat 0.4 sublingual q.5 p.r.n.

3. Lopressor 25 p.o. daily.

4. Plavix 75 mg p.o. daily.

5. Vitamin D3 2000 units p.o. daily.

6. Lipitor 80 mg p.o. daily.

7. Aspirin 81 mg p.o. daily.



ALLERGIES:

None.



PHYSICAL EXAMINATION:

VITAL SIGNS: Temperature 98.1, pulse 141, respiration 20, blood pressure 132/94, pulse

ox 95% on room air.

GENERAL APPEARANCE:  Average built, sitting up, not in distress.  EYES:  Pupils equal.

Conjunctivae normal.

HEENT:  External appearance of nose and ears normal.  Oral cavity normal.

NECK:  JVD not raised.  Mass not palpable.

RESPIRATORY:  Effort normal.

LUNGS:  Fair entry.

CARDIOVASCULAR:  First and second sounds normal.  No edema.

ABDOMEN:  Distended, diffuse tenderness.  No guarding or rigidity.  Bowel sounds are

hyperactive.

LYMPHATIC: No lymph nodes palpable in the neck or axillae.

PSYCHIATRY:  Alert and oriented times three. Mood and affect slightly anxious-

appearing.

NEUROLOGICAL:  Pupils equal.  Cranial nerves grossly intact. Power and sensation

grossly intact.



INVESTIGATIONS:

White count 11.8, hemoglobin 16.2, potassium 4.2, BUN 16, creatinine 1.52.  The

patient's creatinine was 0.97 in February, CT scan of the abdomen and pelvis showing

acute sigmoid diverticulitis and is a concern for colonic neoplasm and enlarged

mesenteric lymph nodes, and there is dilatation of the small bowel.  EKG shows sinus

tachycardia with right bundle branch block.



ASSESSMENT:

1. This patient presents with two days of progressive distention of the abdomen,

    possible sigmoid diverticulitis and bowel obstruction that could be ileus.

2. Chronic alcohol use. Patient drinks about 5-6 beers a day.

3. Coronary artery disease with stent to the RCA in September 2017 and diffuse disease

    to LAD.

4. Acute renal failure.  Creatinine has gone 0.8 to 1.53.

5. Hyperkalemia in the setting of acute renal failure.

6. Hypercalcemia from dehydration.



PLAN:

Patient is put on IV Levaquin and Flagyl.  Home medications will be resumed.

Consultation put to Gastroenterology and General surgery was made.  The patient also

will be put on remote tele and continue with antiplatelet agents.  The patient is also

getting IV fluids.  Care was discussed with the patient.  Also put the patient on a

CIWA scale.  Copy to Dr. Chung.





MMODL / IJN: [id number] / Job#: [id number]

## 2018-06-14 NOTE — XR
EXAMINATION TYPE: XR abdomen 2V

 

DATE OF EXAM: 6/14/2018

 

COMPARISON: 8/4/2014

 

HISTORY: Abdominal distention

 

TECHNIQUE: 3 views

 

FINDINGS: Supine and upright views of the abdomen show multiple dilated loops of small bowel with air
 and fluid. There is contrast in the urinary bladder. There is no evidence of free air. There is a re
lative lack of large bowel gas.

 

IMPRESSION: Dilated small bowel consistent with mechanical small bowel obstruction. Small bowel is mo
re dilated than old exam.

## 2018-06-15 LAB
ALBUMIN SERPL-MCNC: 2.7 G/DL (ref 3.5–5)
ALP SERPL-CCNC: 52 U/L (ref 38–126)
ALT SERPL-CCNC: 38 U/L (ref 21–72)
ANION GAP SERPL CALC-SCNC: 11 MMOL/L
AST SERPL-CCNC: 25 U/L (ref 17–59)
BASOPHILS # BLD AUTO: 0 K/UL (ref 0–0.2)
BASOPHILS NFR BLD AUTO: 1 %
BUN SERPL-SCNC: 13 MG/DL (ref 9–20)
CALCIUM SPEC-MCNC: 8.5 MG/DL (ref 8.4–10.2)
CHLORIDE SERPL-SCNC: 104 MMOL/L (ref 98–107)
CO2 SERPL-SCNC: 21 MMOL/L (ref 22–30)
EOSINOPHIL # BLD AUTO: 0 K/UL (ref 0–0.7)
EOSINOPHIL NFR BLD AUTO: 0 %
ERYTHROCYTE [DISTWIDTH] IN BLOOD BY AUTOMATED COUNT: 4.3 M/UL (ref 4.3–5.9)
ERYTHROCYTE [DISTWIDTH] IN BLOOD: 13.7 % (ref 11.5–15.5)
GLUCOSE SERPL-MCNC: 105 MG/DL (ref 74–99)
HCT VFR BLD AUTO: 39.5 % (ref 39–53)
HGB BLD-MCNC: 13.2 GM/DL (ref 13–17.5)
LYMPHOCYTES # SPEC AUTO: 0.7 K/UL (ref 1–4.8)
LYMPHOCYTES NFR SPEC AUTO: 13 %
MCH RBC QN AUTO: 30.7 PG (ref 25–35)
MCHC RBC AUTO-ENTMCNC: 33.4 G/DL (ref 31–37)
MCV RBC AUTO: 91.9 FL (ref 80–100)
MONOCYTES # BLD AUTO: 0.6 K/UL (ref 0–1)
MONOCYTES NFR BLD AUTO: 10 %
NEUTROPHILS # BLD AUTO: 4 K/UL (ref 1.3–7.7)
NEUTROPHILS NFR BLD AUTO: 73 %
PLATELET # BLD AUTO: 301 K/UL (ref 150–450)
POTASSIUM SERPL-SCNC: 4.5 MMOL/L (ref 3.5–5.1)
PROT SERPL-MCNC: 5.3 G/DL (ref 6.3–8.2)
SODIUM SERPL-SCNC: 136 MMOL/L (ref 137–145)
WBC # BLD AUTO: 5.5 K/UL (ref 3.8–10.6)

## 2018-06-15 RX ADMIN — METRONIDAZOLE SCH MLS/HR: 500 INJECTION, SOLUTION INTRAVENOUS at 13:09

## 2018-06-15 RX ADMIN — MORPHINE SULFATE PRN MG: 2 INJECTION, SOLUTION INTRAMUSCULAR; INTRAVENOUS at 16:58

## 2018-06-15 RX ADMIN — CEFAZOLIN SCH MLS/HR: 330 INJECTION, POWDER, FOR SOLUTION INTRAMUSCULAR; INTRAVENOUS at 23:28

## 2018-06-15 RX ADMIN — METRONIDAZOLE SCH MLS/HR: 500 INJECTION, SOLUTION INTRAVENOUS at 05:03

## 2018-06-15 RX ADMIN — BENZOCAINE AND MENTHOL PRN EACH: 15; 3.6 LOZENGE ORAL at 15:30

## 2018-06-15 RX ADMIN — ATORVASTATIN CALCIUM SCH MG: 80 TABLET, FILM COATED ORAL at 08:58

## 2018-06-15 RX ADMIN — PANTOPRAZOLE SODIUM SCH MG: 40 INJECTION, POWDER, FOR SOLUTION INTRAVENOUS at 08:58

## 2018-06-15 RX ADMIN — MORPHINE SULFATE PRN MG: 2 INJECTION, SOLUTION INTRAMUSCULAR; INTRAVENOUS at 09:49

## 2018-06-15 RX ADMIN — MORPHINE SULFATE PRN MG: 2 INJECTION, SOLUTION INTRAMUSCULAR; INTRAVENOUS at 21:37

## 2018-06-15 RX ADMIN — METOCLOPRAMIDE PRN MG: 5 INJECTION, SOLUTION INTRAMUSCULAR; INTRAVENOUS at 02:42

## 2018-06-15 RX ADMIN — ASPIRIN 81 MG CHEWABLE TABLET SCH: 81 TABLET CHEWABLE at 11:49

## 2018-06-15 RX ADMIN — MORPHINE SULFATE PRN MG: 2 INJECTION, SOLUTION INTRAMUSCULAR; INTRAVENOUS at 05:03

## 2018-06-15 RX ADMIN — CEFAZOLIN SCH: 330 INJECTION, POWDER, FOR SOLUTION INTRAMUSCULAR; INTRAVENOUS at 11:49

## 2018-06-15 RX ADMIN — CEFAZOLIN SCH MLS/HR: 330 INJECTION, POWDER, FOR SOLUTION INTRAMUSCULAR; INTRAVENOUS at 18:02

## 2018-06-15 RX ADMIN — CEFAZOLIN SCH: 330 INJECTION, POWDER, FOR SOLUTION INTRAMUSCULAR; INTRAVENOUS at 01:40

## 2018-06-15 RX ADMIN — METRONIDAZOLE SCH MLS/HR: 500 INJECTION, SOLUTION INTRAVENOUS at 23:26

## 2018-06-15 RX ADMIN — METRONIDAZOLE SCH MLS/HR: 500 INJECTION, SOLUTION INTRAVENOUS at 18:59

## 2018-06-15 RX ADMIN — CLOPIDOGREL BISULFATE SCH: 75 TABLET ORAL at 11:49

## 2018-06-15 RX ADMIN — METOPROLOL TARTRATE SCH MG: 25 TABLET, FILM COATED ORAL at 08:58

## 2018-06-15 RX ADMIN — BENZOCAINE AND MENTHOL PRN EACH: 15; 3.6 LOZENGE ORAL at 18:58

## 2018-06-15 RX ADMIN — LEVOFLOXACIN SCH MLS/HR: 750 INJECTION, SOLUTION INTRAVENOUS at 08:58

## 2018-06-15 NOTE — P.PN
Progress Note - Text


Progress Note Date: 06/15/18





The patient is resting Bed.  Patient's had several CAT scans performed.  He 

appears to have an ileus.  There is no significant mechanical obstruction.  His 

CAT scans were reviewed with Dr. Kay.





On exam his vital signs are stable.  His abdomen soft.  The abdomen is quite 

distended.





Patient had a NG tube placed.  4 mL of bilious fluid was removed.  Patient will 

continue to have NG tube to low suction.  He'll remain nothing by mouth until 

his ileus resolves.

## 2018-06-15 NOTE — PN
PROGRESS NOTE



DATE OF SERVICE:

06/15/2018.



PRESENTING COMPLAINT:

Abdominal pain.



INTERVAL HISTORY:

This patient presented with a small-bowel obstruction, still remains distended, has not

had any flatus.  NG tube was being placed this morning.  Feels rather uncomfortable.



REVIEW OF SYSTEMS:

Done for constitutional, cardiovascular, GI, pulmonary; relevant findings as above.



CURRENT MEDICATIONS:

Reviewed.  Aspirin and Plavix have been held by surgery.  Getting IV fluids.



EXAMINATION:

Temperature 98.3, pulse 59, respirations 18, blood pressure 124/84, pulse ox 92% on

room air.

GENERAL APPEARANCE:  Sitting up, somewhat anxious-appearing.

EYES:  Pupils equal.  Conjunctivae normal.

HEENT:  External nose and ears normal.  Oral cavity normal.

NECK:  JVD not raised.  Mass not palpable.

RESPIRATORY:  Effort increased.

LUNGS:  Fair air entry.

CARDIOVASCULAR:  First and second sounds normal.  No edema.

ABDOMEN:  Distended, diffusely tender.  No guarding or rigidity.  Bowel sounds present.

LYMPHATIC:  No lymph node palpable in neck or axillae.

PSYCHIATRY:  Alert and oriented x3.  Mood and affect anxious-appearing.



INVESTIGATIONS:

White count 5.5, potassium 4.5, creatinine 0.76.  Abdominal x-ray showing ileus.



ASSESSMENT:

1. Acute severe ileus versus small-bowel obstruction, clinically slow to respond.

2. Chronic alcohol use.

3. Coronary artery with stent to the right coronary artery in September 2017 and

    diffuse disease to the left anterior descending.

4. Acute renal failure, prerenal.  Creatinine has come down.

5. Hyperkalemia in the setting of acute renal failure, improved.

6. Hypercalcemic dehydration.



PLAN:

NG tube is being placed.  Antibiotics are to continue.  The patient is being followed

by Dr. Benson.  He was called the results on the CT scan today.  Follow.





MMODL / IJN: [id number] / Job#: [id number]

## 2018-06-15 NOTE — P.CRDCN
History of Present Illness


History of present illness: 





Mr. Franco is a pleasant 58-year-old  male past medical history 

significant for coronary artery disease with angioplasty x3 of the RCA 10/2017 

he also had moderate disease of the circumflex artery and diffuse disease to 

LAD. His most recent catheterization was performed in December 2017 and 

revealed patent stents to RCA and no progression of disease to LAD or 

circumflex. He has been maintained on aspirin, plavix, lopressor and 

atorvastatin. He has presented to the hospital with abdominal pain and 

distension. CT performed revealed evidence of acute sigmoid diverticulitis and 

concern for colonic neoplasm as well as dilitation of the small bowel 

suggesting ileus. He has undergone serial CT's for ongoing evaluation per 

surgery. Most CT performed this morning obtained imaging of the lung bases and 

reveals a small right pleural effusion has developed. We have been asked to see 

him secondary to history of CAD. Surgery has recommended GI rest and NG tube 

with ongoing evaluation. Currently Dr. Luther has held anti-platelets should 

he require surgery. He denies symptoms of chest pain, shortness of breath, 

diaphoresis, palpitations or dizziness. He states he hasn't had a bowel 

movement in 3 days and is starting to feel some movement occur although he has 

not yet passed any gas. He denies bleeding from the rectum or hematemesis. 





EKG reveals sinus tachycardia heart rate 129 with right bundle branch block. 

This is not new. Consistent with old EKG from February. 


Laboratory data reviewed, hemoglobin 13.2 down from 16.2 yesterday., platelets 

301, sodium 136, potassium 4.5, creatinine 0.76. 


Current cardiac medications include aspirin 81 mg daily, atorvastatin 80 mg 

daily, Plavix 75 mg daily and Lopressor 25 mg daily.  He also takes Prilosec 

and vitamin D.


Most recent echocardiogram reveals preserved left ventricular systolic function 

with ejection fraction 50-55%, mild aortic regurgitation, mild MR and mild TR.








Review of Systems





At the time of exam:


CONSTITUTIONAL: Denies fever. Denies chills.


EYES: Denies blurred vision. Denies vision changes. Denies eye pain.


EARS, NOSE, MOUTH & THROAT: Denies headache. Denies sore throat. Denies ear 

pain.


CARDIOVASCULAR: Denies chest pain. Denies shortness of breath. Denies 

orthopnea. Denies PND. Denies palpitations.


RESPIRATORY: Denies cough. 


GASTROINTESTINAL: Complains of abdominal pain. Denies diarrhea.  Complains of 

constipation. Denies nausea. Denies vomiting.


MUSCULOSKELETAL: Denies myalgias.


INTEGUMENTARY: Denies pruitis. Denies rash.


NEUROLOGIC: Denies numbness. Denies tingling. Denies weakness.


PSYCHIATRIC: Denies anxiety. Denies depression.


ENDOCRINE: Denies fatigue. Denies weight change. Denies polydipsia. Denies 

polyurina.


GENITOURINARY: Denies burning, hematuria or urgency with micturation.


HEMATOLOGIC: Denies history of anemia. Denies bleeding. 








Past Medical History


Past Medical History: Coronary Artery Disease (CAD), Cancer, Chest Pain / Angina

, GI Bleed, Myocardial Infarction (MI)


Additional Past Medical History / Comment(s): 2/13/18 lower GI bleed-unknown 

source, blood loss anemia with transfusion, 10/23/17 stemi, skin cancer removal-

right eyebrow, kidney stone which pt passed on his own.


Last Myocardial Infarction Date:: 10/23/17


History of Any Multi-Drug Resistant Organisms: None Reported


Past Surgical History: Heart Catheterization With Stent, Tonsillectomy


Additional Past Surgical History / Comment(s): 2/2018 EGD/colonoscopy, skin 

cancer removal, 3 stents on 10/23/17, 12/12/17 cardiac cath with maximizing 

medical therapy.


Past Anesthesia/Blood Transfusion Reactions: No Reported Reaction


Date of Last Stent Placement:: 10/23/17


Smoking Status: Former smoker





- Past Family History


  ** Mother


Family Medical History: CVA/TIA





  ** Father


Family Medical History: Cancer


Additional Family Medical History / Comment(s): pancreatic





Medications and Allergies


 Home Medications











 Medication  Instructions  Recorded  Confirmed  Type


 


Aspirin 81 mg PO QAM 10/23/17 06/14/18 History


 


Cholecalciferol (Vitamin D3) 2,000 unit PO QAM 10/23/17 06/14/18 History





[Vitamin D3]    


 


Nitroglycerin Sl Tabs [Nitrostat] 0.4 mg SUBLINGUAL Q5M PRN #25 tab 10/25/17 06/

14/18 Rx


 


Atorvastatin [Lipitor] 80 mg PO QAM 12/08/17 06/14/18 History


 


Metoprolol Tartrate [Lopressor] 25 mg PO QAM 12/08/17 06/14/18 History


 


Clopidogrel [Plavix] 75 mg PO QAM 02/13/18 06/14/18 History


 


Omeprazole [PriLOSEC] 20 mg PO AC-BRKFST #30 cap 02/16/18 06/14/18 Rx











 Allergies











Allergy/AdvReac Type Severity Reaction Status Date / Time


 


No Known Allergies Allergy   Verified 06/14/18 07:43














Physical Exam


Vitals: 


 Vital Signs











  Temp Pulse Resp BP Pulse Ox


 


 06/15/18 07:17  98.3 F  89  18  124/84  92 L


 


 06/15/18 00:22  98.2 F  84  15  134/84  94 L


 


 06/14/18 19:58  97.0 F L  81  16  144/89  97


 


 06/14/18 14:59  97.3 F L  102 H  18  115/72  95








 Intake and Output











 06/14/18 06/15/18 06/15/18





 22:59 06:59 14:59


 


Intake Total 250 1250 


 


Output Total 500 200 


 


Balance -250 1050 


 


Intake:   


 


   1250 


 


    Sodium Chloride 0.9% 1, 250 1250 





    000 ml @ 125 mls/hr IV .   





    Q8H [facility] Rx#:[id number]   


 


Output:   


 


  Urine 500 200 


 


    Straight 500 200 














Blood pressure 124/84 heart rate 89 afebrile maintaining oxygen saturation on 

room air


GENERAL: This is a 58-year-old  male in no apparent distress at the 

time of my examination. Pael appearance.


HEENT: Head is atraumatic, normocephalic. Pupils are equal, round. Sclerae 

anicteric. Conjunctivae are clear. Mucous membranes of the mouth are moist. 

Neck is supple. There is no jugular venous distention. No carotid bruit is 

heard. NG tube in place. 


LUNGS: Clear to auscultation no wheezes, rales or rhonchi. No chest wall 

tenderness is noted on palpation or with deep breathing.


HEART: Regular rate and rhythm without murmurs, rubs or gallops. S1 and S2 

heard.


ABDOMEN: Firm, nontender, distended, no bowel sounds. No organomegaly noted.


EXTREMITIES: No evidence of peripheral edema and no calf tenderness noted.


VASCULAR: Radial and dorsalis pedis pulses palpated, no evidence of clubbing.  


NEUROLOGIC: Patient is awake, alert and oriented x3.


 








Results





 06/15/18 06:20





 06/15/18 06:20


 Cardiac Enzymes











  06/15/18 Range/Units





  06:20 


 


AST  25  (17-59)  U/L








 CBC











  06/15/18 Range/Units





  06:20 


 


WBC  5.5  (3.8-10.6)  k/uL


 


RBC  4.30  (4.30-5.90)  m/uL


 


Hgb  13.2  D  (13.0-17.5)  gm/dL


 


Hct  39.5  (39.0-53.0)  %


 


Plt Count  301  (150-450)  k/uL








 Comprehensive Metabolic Panel











  06/15/18 Range/Units





  06:20 


 


Sodium  136 L  (137-145)  mmol/L


 


Potassium  4.5  (3.5-5.1)  mmol/L


 


Chloride  104  ()  mmol/L


 


Carbon Dioxide  21 L  (22-30)  mmol/L


 


BUN  13  (9-20)  mg/dL


 


Creatinine  0.76  (0.66-1.25)  mg/dL


 


Glucose  105 H  (74-99)  mg/dL


 


Calcium  8.5  (8.4-10.2)  mg/dL


 


AST  25  (17-59)  U/L


 


ALT  38  (21-72)  U/L


 


Alkaline Phosphatase  52  ()  U/L


 


Total Protein  5.3 L  (6.3-8.2)  g/dL


 


Albumin  2.7 L  (3.5-5.0)  g/dL








 Current Medications











Generic Name Dose Route Start Last Admin





  Trade Name Freq  PRN Reason Stop Dose Admin


 


Aspirin  81 mg  06/14/18 17:00  06/15/18 11:49





  Aspirin  PO   Not Given





  [facility]   


 


Atorvastatin Calcium  80 mg  06/14/18 16:15  06/15/18 08:58





  Lipitor  PO   80 mg





  QAM ISA   Administration


 


Clopidogrel Bisulfate  75 mg  06/14/18 17:00  06/15/18 11:49





  Plavix  PO   Not Given





  QAM [facility]   


 


Sodium Chloride  1,000 mls @ 125 mls/hr  06/14/18 08:45  06/15/18 11:49





  Saline 0.9%  IV   Not Given





  .Q8H ISA   


 


Levofloxacin 750 mg/ IV  150 mls @ 100 mls/hr  06/15/18 09:00  06/15/18 08:58





  Solution  IVPB   100 mls/hr





  Q24H ISA   Administration


 


Metronidazole 500 mg/ IV  100 mls @ 100 mls/hr  06/14/18 12:00  06/15/18 05:03





  Solution  IVPB   100 mls/hr





  Q6HR ISA   Administration


 


Lorazepam  1 mg  06/14/18 20:02  





  Ativan  IV   





  Q2HR PRN   





  CIWA 8 or 9   


 


Lorazepam  1 mg  06/14/18 20:02  





  Ativan  IV   





  Q1HR PRN   





  CIWA 10 to 15   


 


Lorazepam  2 mg  06/14/18 20:02  





  Ativan  IV  06/16/18 20:02  





  Q10M PRN   





  CIWA 16 or higher   


 


Metoclopramide HCl  5 mg  06/14/18 08:48  06/15/18 02:42





  Reglan  IVP   5 mg





  Q6HR PRN   Administration





  Nausea And Vomiting   


 


Metoprolol Tartrate  25 mg  06/14/18 16:15  06/15/18 08:58





  Lopressor  PO   25 mg





  QAM ISA   Administration


 


Morphine Sulfate  4 mg  06/14/18 08:45  06/15/18 09:49





  Morphine Sulfate (Inj)  IV   4 mg





  Q4HR PRN   Administration





  Severe Pain   


 


Naloxone HCl  0.2 mg  06/14/18 08:45  





  Narcan  IV   





  Q2M PRN   





  Opioid Reversal   


 


Pantoprazole Sodium  40 mg  06/14/18 09:00  06/15/18 08:58





  Protonix  IV   40 mg





  DAILY ISA   Administration








 Intake and Output











 06/14/18 06/15/18 06/15/18





 22:59 06:59 14:59


 


Intake Total 250 1250 


 


Output Total 500 200 


 


Balance -250 1050 


 


Intake:   


 


   1250 


 


    Sodium Chloride 0.9% 1, 250 1250 





    000 ml @ 125 mls/hr IV .   





    Q8H ISA Rx#:[id number]   


 


Output:   


 


  Urine 500 200 


 


    Straight 500 200 








 





 06/15/18 06:20 





 06/15/18 06:20 











Assessment and Plan


Assessment: 





ASSESSMENT


1.  Ileus, bowel rest and decompression with NG tube ongoing per surgery.


2.  Sigmoid diverticulitis, receiving IV antibiotics


3.  History of known coronary artery disease status post angioplasty October 2017 currently on dual anti-platelet therapy 


4.  Former tobacco use, quit 10/2017


5.  Chronic daily alcohol use





PLAN


Ongoing medical management of ileus and diverticulitis. 


Plavix and aspirin has been held per surgery pending the need for possible 

surgical intervention. 


This should be resumed as soon as possible per surgery recommendations. 


Thank you kindly for this consultation. 





Nurse Practitioner note has been reviewed, I agree with a documented findings 

and plan of care.  Patient was seen and examined.

## 2018-06-15 NOTE — CT
EXAMINATION TYPE: CT abdomen pelvis wo con

 

DATE OF EXAM: 6/15/2018

 

COMPARISON: 6/14/2018

 

INDICATION: Follow up to prior CT. Obstruction

 

DLP: 1105 mGycm, Automated exposure control for dose reduction was used.

 

CONTRAST:  0 mL of Isovue 370. 

                        Study performed without Oral Contrast

 

TECHNIQUE: Axial images were obtained from above the diaphragm to the pubic rami in the axial plane a
t 5 mm thick sections.  Reconstructed images are reviewed on the computer in the coronal plane.  

 

FINDINGS:

 

Limited CT sections are obtained the lung bases.  Small right pleural effusion is developed. Coronary
 artery calcification is present..

 

CT ABDOMEN: Contrast remains within the stomach in the proximal small bowel loops. This extends to di
stal small bowel loops. Contrast enhancement however from the previous evening appears somewhat limit
ed. The distal ileum and the terminal ileum appears less prominent than the more proximal small bowel
 loops. A zone of transition however is not identified. Some pneumatosis intestinalis may be developi
ng at the cecum.

 

Liver: Small amount of ascites is adjacent to the liver which may be subtly increased from comparison
.

 

Spleen: Normal

 

Pancreas: Normal

 

Adrenal glands: The adrenal glands are normal.

 

Gallbladder: Normal  

 

Kidneys: No masses are evident. No hydronephrosis is present.   No cysts are present.  Study is perfo
rmed without oral contrast.

 

Aorta: Vascular calcification is within the aorta. 

 

Inferior vena cava: Normal.

 

CT PELVIS: Small amount of free fluid is within the pelvis.

Small bowel loops are dilated and fluid-filled. Contrast has some advancement from the previous exami
nation into the ileum which is dilated. Contrast appears to be diffusing through the fluid extends to
 the distal ileum. Some contrast may be within the cecum and ascending colon. Significant advancement
 however is not evident. Small bowel loops remain dilated.

 

Appendix: Not visualized

 

Urinary bladder: Normal. 

 

Genitourinary structures: Prostate appears normal

 

Osseous structures: No suspicious lytic or sclerotic lesions.

 

IMPRESSIONS:

1.  Significant ileus with dilated loops of bowel.

2. Obstruction is not clearly identified. Some faint contrast diffusing through the fluid appears to 
have reached the cecum aerated

3. Interval development of some pneumatosis intestinalis at the cecum. 4 report was called and case d
iscussed with Dr. Benson by Dr. Kay by telephone at 0940 hours 6/15/2018

## 2018-06-16 RX ADMIN — MORPHINE SULFATE PRN MG: 2 INJECTION, SOLUTION INTRAMUSCULAR; INTRAVENOUS at 18:12

## 2018-06-16 RX ADMIN — MORPHINE SULFATE PRN MG: 2 INJECTION, SOLUTION INTRAMUSCULAR; INTRAVENOUS at 07:56

## 2018-06-16 RX ADMIN — METRONIDAZOLE SCH MLS/HR: 500 INJECTION, SOLUTION INTRAVENOUS at 05:19

## 2018-06-16 RX ADMIN — METOPROLOL TARTRATE SCH MG: 25 TABLET, FILM COATED ORAL at 07:55

## 2018-06-16 RX ADMIN — METRONIDAZOLE SCH MLS/HR: 500 INJECTION, SOLUTION INTRAVENOUS at 17:19

## 2018-06-16 RX ADMIN — CLOPIDOGREL BISULFATE SCH: 75 TABLET ORAL at 11:47

## 2018-06-16 RX ADMIN — MORPHINE SULFATE PRN MG: 2 INJECTION, SOLUTION INTRAMUSCULAR; INTRAVENOUS at 13:35

## 2018-06-16 RX ADMIN — ATORVASTATIN CALCIUM SCH MG: 80 TABLET, FILM COATED ORAL at 07:56

## 2018-06-16 RX ADMIN — METRONIDAZOLE SCH MLS/HR: 500 INJECTION, SOLUTION INTRAVENOUS at 12:26

## 2018-06-16 RX ADMIN — METOCLOPRAMIDE PRN MG: 5 INJECTION, SOLUTION INTRAMUSCULAR; INTRAVENOUS at 21:01

## 2018-06-16 RX ADMIN — PANTOPRAZOLE SODIUM SCH MG: 40 INJECTION, POWDER, FOR SOLUTION INTRAVENOUS at 07:56

## 2018-06-16 RX ADMIN — LEVOFLOXACIN SCH MLS/HR: 750 INJECTION, SOLUTION INTRAVENOUS at 07:56

## 2018-06-16 RX ADMIN — ASPIRIN 81 MG CHEWABLE TABLET SCH MG: 81 TABLET CHEWABLE at 07:55

## 2018-06-16 RX ADMIN — BENZOCAINE AND MENTHOL PRN EACH: 15; 3.6 LOZENGE ORAL at 10:28

## 2018-06-16 RX ADMIN — MORPHINE SULFATE PRN MG: 2 INJECTION, SOLUTION INTRAMUSCULAR; INTRAVENOUS at 02:59

## 2018-06-16 RX ADMIN — CEFAZOLIN SCH MLS/HR: 330 INJECTION, POWDER, FOR SOLUTION INTRAMUSCULAR; INTRAVENOUS at 07:57

## 2018-06-16 RX ADMIN — MORPHINE SULFATE PRN MG: 2 INJECTION, SOLUTION INTRAMUSCULAR; INTRAVENOUS at 22:53

## 2018-06-16 RX ADMIN — CEFAZOLIN SCH MLS/HR: 330 INJECTION, POWDER, FOR SOLUTION INTRAMUSCULAR; INTRAVENOUS at 17:20

## 2018-06-16 NOTE — P.PN
Progress Note - Text


Progress Note Date: 06/16/18





The patient states he feels better today.  He has less abdominal bloating.  He 

denies any significant bowel function.





On exam his vital signs are stable.  His abdomen is soft.  The abdomen is less 

distended than yesterday.





Ileus.  Patient continued receiving NG tube decompression.

## 2018-06-16 NOTE — PN
PROGRESS NOTE



DATE OF SERVICE:

June 16, 2018.



PRESENTING COMPLAINT:

Abdominal pain.



INTERVAL HISTORY:

Patient presented with small-bowel obstruction, less distention, not passed any flatus.

NG tube remains in place.  Feels a bit better.  No nausea, vomiting.



REVIEW OF SYSTEMS:

Done for constitutional, cardiovascular, GI, pulmonary and relevant findings as above.



CURRENT MEDICATIONS:

Reviewed that include IV Levaquin and Flagyl.



PHYSICAL EXAMINATION:

VITAL SIGNS: Temperature 98.2, pulse 78, respiratory 18, blood pressure 129/79, pulse

ox 94% on room air.

GENERAL APPEARANCE: Lying in bed, comfortable.

EYES: Pupils equal. Conjunctivae normal.

HEENT:  External appearance of nose and ears normal.  Oral cavity dry with NG tube in

place.

NECK: JVD not raised.  Mass not palpable.

RESPIRATORY:  Effort normal.

LUNGS:  Fair entry.

CARDIOVASCULAR:  1st and 2nd sounds normal. No edema.  ABDOMEN:  Less distended.  Soft,

minimal tenderness.  Bowel sounds are sluggish.

PSYCHIATRY:  Alert and oriented x3. Mood and affect anxious-appearing.



INVESTIGATIONS:

No blood work from today.



ASSESSMENT:

1. Acute severe ileus versus small bowel obstruction with some clinical improvement.

2. Chronic alcohol use.

3. Coronary artery disease with stent to the RCA and diffuse disease to LAD.

4. Acute renal failure prerenal.  Creatinine has come down.

5. Hyperkalemia in the setting of acute renal failure, improved.

6. Hypercalcemia from dehydration.



PLAN:

Patient should have repeat abdominal x-ray tomorrow.  The patient has been ambulating.

Encouraged to ambulate more.





MMODL / IJN: [id number] / Job#: [id number]

## 2018-06-17 RX ADMIN — CEFAZOLIN SCH MLS/HR: 330 INJECTION, POWDER, FOR SOLUTION INTRAMUSCULAR; INTRAVENOUS at 01:14

## 2018-06-17 RX ADMIN — ATORVASTATIN CALCIUM SCH MG: 80 TABLET, FILM COATED ORAL at 07:55

## 2018-06-17 RX ADMIN — CEFAZOLIN SCH MLS/HR: 330 INJECTION, POWDER, FOR SOLUTION INTRAMUSCULAR; INTRAVENOUS at 17:54

## 2018-06-17 RX ADMIN — MORPHINE SULFATE PRN MG: 2 INJECTION, SOLUTION INTRAMUSCULAR; INTRAVENOUS at 22:31

## 2018-06-17 RX ADMIN — ASPIRIN 81 MG CHEWABLE TABLET SCH MG: 81 TABLET CHEWABLE at 07:55

## 2018-06-17 RX ADMIN — LEVOFLOXACIN SCH MLS/HR: 750 INJECTION, SOLUTION INTRAVENOUS at 07:55

## 2018-06-17 RX ADMIN — METRONIDAZOLE SCH MLS/HR: 500 INJECTION, SOLUTION INTRAVENOUS at 06:02

## 2018-06-17 RX ADMIN — MORPHINE SULFATE PRN MG: 2 INJECTION, SOLUTION INTRAMUSCULAR; INTRAVENOUS at 04:39

## 2018-06-17 RX ADMIN — METRONIDAZOLE SCH MLS/HR: 500 INJECTION, SOLUTION INTRAVENOUS at 17:55

## 2018-06-17 RX ADMIN — CEFAZOLIN SCH: 330 INJECTION, POWDER, FOR SOLUTION INTRAMUSCULAR; INTRAVENOUS at 09:28

## 2018-06-17 RX ADMIN — METRONIDAZOLE SCH MLS/HR: 500 INJECTION, SOLUTION INTRAVENOUS at 23:18

## 2018-06-17 RX ADMIN — CLOPIDOGREL BISULFATE SCH MG: 75 TABLET ORAL at 07:55

## 2018-06-17 RX ADMIN — METRONIDAZOLE SCH MLS/HR: 500 INJECTION, SOLUTION INTRAVENOUS at 11:32

## 2018-06-17 RX ADMIN — METOPROLOL TARTRATE SCH MG: 25 TABLET, FILM COATED ORAL at 07:55

## 2018-06-17 RX ADMIN — PANTOPRAZOLE SODIUM SCH MG: 40 INJECTION, POWDER, FOR SOLUTION INTRAVENOUS at 07:54

## 2018-06-17 RX ADMIN — METRONIDAZOLE SCH MLS/HR: 500 INJECTION, SOLUTION INTRAVENOUS at 00:32

## 2018-06-17 RX ADMIN — HYDROCODONE BITARTRATE AND ACETAMINOPHEN PRN EACH: 7.5; 325 TABLET ORAL at 14:08

## 2018-06-17 NOTE — PN
PROGRESS NOTE



DATE OF SERVICE:

June 17, 2018.



PRESENT COMPLAINT:

Bowel obstruction.



INTERVAL HISTORY:

Patient presented with small bowel obstruction.  NG tube was taken out this morning.

Patient has passed flatus, has a small bowel movement, given clear liquids by Dr. Benson.  Abdomen is still somewhat distended.



REVIEW OF SYSTEMS:

Done for constitutional, cardiovascular, GI, pulmonary and relevant findings as above.



CURRENT MEDICATIONS:

Reviewed that include Levaquin and Flagyl.



PHYSICAL EXAMINATION:

VITAL SIGNS: Temperature 98.5, pulse 84, respiratory rate 16, blood pressure 152/80,

pulse ox 94% on room air.

GENERAL APPEARANCE: Sitting up, awake.

EYES:  Pupils equal. Conjunctivae normal.

HEENT:  External appearance of nose and ears normal.

NECK:  JVD not raised.  Mass not palpable.

RESPIRATORY:  Effort normal.

LUNGS:  Are clear.

CARDIOVASCULAR:  1st and 2nd sounds normal, no edema.  ABDOMEN:  Less distended.  Soft.

Bowel sounds are present.

PSYCHIATRY:  Alert and oriented x3. Mood and affect normal.



INVESTIGATIONS:

No blood work from today.



ASSESSMENT:

1. Acute severe ileus versus small-bowel obstruction with clinical improvement.

2. Chronic alcohol use.

3. Coronary artery disease with stent to the RCA and diffuse disease to the LAD.

4. Acute renal failure, prerenal.  Creatinine come down.

5. Hypokalemia in a setting of acute renal failure, improved.

6. Hypercalcemia from dehydration.



PLAN:

Patient getting better, started on clear liquids.  Care was discussed with the patient.

Told her to back off on the morphine.  Repeat abdominal x-ray in the morning.





MMODL / IJN: [id number] / Job#: [id number]

## 2018-06-17 NOTE — P.PN
Progress Note - Text


Progress Note Date: 06/17/18





The patient states he feels better today.  He's had a small amount of flatus.  

He has had less pain.





On exam his vital signs are stable.  His abdomen is soft.  There is less 

distention.  There is less tenderness.





Resolving ileus.  Patient will have his NG tube removed and start clear liquid 

diet.

## 2018-06-17 NOTE — XR
EXAMINATION TYPE: XR chest 2V

 

DATE OF EXAM: 6/17/2018

 

COMPARISON: 10/23/2017

 

TECHNIQUE: PA and lateral views submitted.

 

HISTORY: Chest pain

 

FINDINGS:

NG tube noted and there is left lower lobe infiltrate and small effusion. No pneumothorax or intersti
tial edema. Heart size within normal limits.

 

IMPRESSION:

1. Left lower lobe infiltrate and small effusion.

## 2018-06-18 RX ADMIN — HYDROCODONE BITARTRATE AND ACETAMINOPHEN PRN EACH: 7.5; 325 TABLET ORAL at 23:39

## 2018-06-18 RX ADMIN — METRONIDAZOLE SCH MLS/HR: 500 INJECTION, SOLUTION INTRAVENOUS at 12:30

## 2018-06-18 RX ADMIN — PANTOPRAZOLE SODIUM SCH MG: 40 INJECTION, POWDER, FOR SOLUTION INTRAVENOUS at 07:30

## 2018-06-18 RX ADMIN — CEFAZOLIN SCH MLS/HR: 330 INJECTION, POWDER, FOR SOLUTION INTRAMUSCULAR; INTRAVENOUS at 12:31

## 2018-06-18 RX ADMIN — CEFAZOLIN SCH MLS/HR: 330 INJECTION, POWDER, FOR SOLUTION INTRAMUSCULAR; INTRAVENOUS at 05:46

## 2018-06-18 RX ADMIN — LEVOFLOXACIN SCH MLS/HR: 750 INJECTION, SOLUTION INTRAVENOUS at 07:30

## 2018-06-18 RX ADMIN — HYDROCODONE BITARTRATE AND ACETAMINOPHEN PRN EACH: 7.5; 325 TABLET ORAL at 15:50

## 2018-06-18 RX ADMIN — METRONIDAZOLE SCH MLS/HR: 500 INJECTION, SOLUTION INTRAVENOUS at 05:47

## 2018-06-18 RX ADMIN — METOCLOPRAMIDE PRN MG: 5 INJECTION, SOLUTION INTRAMUSCULAR; INTRAVENOUS at 03:50

## 2018-06-18 RX ADMIN — ATORVASTATIN CALCIUM SCH MG: 80 TABLET, FILM COATED ORAL at 07:30

## 2018-06-18 RX ADMIN — CLOPIDOGREL BISULFATE SCH MG: 75 TABLET ORAL at 07:30

## 2018-06-18 RX ADMIN — CEFAZOLIN SCH: 330 INJECTION, POWDER, FOR SOLUTION INTRAMUSCULAR; INTRAVENOUS at 09:52

## 2018-06-18 RX ADMIN — ASPIRIN 81 MG CHEWABLE TABLET SCH MG: 81 TABLET CHEWABLE at 07:30

## 2018-06-18 RX ADMIN — METOCLOPRAMIDE PRN MG: 5 INJECTION, SOLUTION INTRAMUSCULAR; INTRAVENOUS at 14:11

## 2018-06-18 RX ADMIN — CEFAZOLIN SCH MLS/HR: 330 INJECTION, POWDER, FOR SOLUTION INTRAMUSCULAR; INTRAVENOUS at 17:12

## 2018-06-18 RX ADMIN — METRONIDAZOLE SCH MLS/HR: 500 INJECTION, SOLUTION INTRAVENOUS at 17:08

## 2018-06-18 RX ADMIN — METRONIDAZOLE SCH MLS/HR: 500 INJECTION, SOLUTION INTRAVENOUS at 23:41

## 2018-06-18 RX ADMIN — METOPROLOL TARTRATE SCH MG: 25 TABLET, FILM COATED ORAL at 07:30

## 2018-06-18 NOTE — P.PN
Progress Note - Text


Progress Note Date: 06/18/18





Patient reports more abdominal gas bloat and pain since NGT removed more than 

24 hrs ago. He is requesting more pain meds. AXR are worse. I personally 

reviewed his CT scan where the colon is collapsed with severely dilated small 

bowel. 





Recommend placement of NGT that should help with his pain. Recommend diagnostic 

laparoscopy  lysis of adhesion as he is a bowel obstruction in a virgin abdomen.

## 2018-06-18 NOTE — P.PN
Subjective


Progress Note Date: 06/18/18


Progress note  being dictated for Dr. Lopez














Interval history: This is a 58-year-old gentleman admitted with partial  bowel 

obstruction involving both small and large bowel and multiple other medical 

issues.  NG tube and Londono catheter discontinued yesterday.  Reports multiple 

bowel movements, yet abdomen remains distended.  Denies abdominal tenderness.  

Follow-up Abdominal x-ray reporting continued marked diffuse small bowel 

dilatation, prominent air throughout the right;, suggestive of severe ileus 

rather than small bowel obstruction and no free intraperitoneal air below the 

hemidiaphragms .complains of shortness of breath, suspect related to abdominal 

distention, chest x-ray ordered. Voiding without difficulty.  Complains of 

nausea, on clear liquid diet as per surgery.  Afebrile.  Blood cultures 

negative.








Objective





- Vital Signs


Vital signs: 


 Vital Signs











Temp  97.3 F L  06/18/18 15:00


 


Pulse  86   06/18/18 15:00


 


Resp  16   06/18/18 15:00


 


BP  158/96   06/18/18 15:00


 


Pulse Ox  96   06/18/18 15:00








 Intake & Output











 06/17/18 06/18/18 06/18/18





 18:59 06:59 18:59


 


Intake Total   200


 


Balance   200


 


Weight   74.843 kg


 


Intake:   


 


  Oral   200


 


Other:   


 


  Voiding Method Indwelling Catheter Toilet Toilet


 


  # Voids 400 2 5


 


  # Bowel Movements 1  














- Exam


PHYSICAL EXAM:


VITAL SIGNS: [As above]


GENERAL: Sitting up in bed, no acute distress


HEENT:  Conjunctivae normal. eyes normal.  Oral mucosa dry.


NECK:  No JVD. No thyroid enlargement. No LNs


CARDIOVASCULAR:  S1, S2 muffled. No murmur


RESPIRATION: Breath sounds diminished in the bases. No rhonchi or crackles. No 

bronchial breathing.


ABDOMEN:  Soft,  nontender .  Distended.  Hypoactive bowel sounds. No guarding. 

no masses palpable.


LEGS:  No edema. no swelling 


PSYCHIATRY: Alert and oriented -3, mood and affect normal.


NERVOUS SYSTEM:  Cranial N 2-12 grossly normal. Moves all 4 limbs.  Diffuse 

weakness No focal deficits. 














- Labs


CBC & Chem 7: 


 06/15/18 06:20





 06/15/18 06:20


Labs: 


 Microbiology - Last 24 Hours (Table)











 06/14/18 09:10 Blood Culture - Preliminary





 Blood    No Growth after 96 hours














Assessment and Plan


Assessment: 


1.  Acute severe ileus, possible Acute partial bowel obstruction, involving the 

small and large bowel with possible mild acute diverticulitis per computed 

tomography scan


2.  Recent colonoscopy 2/2018 reporting scattered diverticulosis of sigmoid 

colon with severe tortuous sigmoid colon.


2.  CAD, stenting in October 2017 on dual antiplatelet therapy








Plan: Continue current medication regime ,monitoring and symptomatic treatment.

  Maintain IV fluid hydration. Dual anticoagulation not yet resumed- remains on 

hold as per surgery, increase ambulation as tolerated.  No labs today, BMP, CBC 

ordered for tomorrow. Follow closely with surgery.














The impression and plan of care has been dictated as directed.





:


I performed a history and examination of this patient,  discussed the same with 

the dictator.  I agree with the dictator's note ,documented as a scribe.  Any 

additional findings or plans will be noted.

## 2018-06-18 NOTE — XR
EXAMINATION TYPE: XR chest 2V

 

DATE OF EXAM: 6/18/2018

 

COMPARISON: 6/17/2018

 

HISTORY: 58-year-old male with shortness of breath

 

TECHNIQUE:  Frontal and lateral views

 

FINDINGS:  

Heart normal size. Aorta and pulmonary vasculature within normal limits. Chilaiditi syndrome. Promine
nt air-filled bowel loops present in the visualized upper small bowel loops are dilated up to 3.9 cm.
 Trace effusions may be present on the lateral view. Some strandy opacities at the lung bases likely 
atelectasis.

 

 

 

 

IMPRESSION:  

 

1. Some volume loss involving the lower lungs with bibasilar densities likely representing areas of a
telectasis. Trace effusions may be present.

2. Diffuse bowel dilatation redemonstrated.

## 2018-06-18 NOTE — P.PN
<Areli Simondanielle CARRERA - Last Filed: 06/18/18 15:05>





Subjective


Progress Note Date: 06/18/18





4-year-old male seen and examined at the bedside.  Patient states she's been up 

ambulating in the hallway this morning.  States passing gas had a bowel 

movement but the abdomen feels more distended this morning.  Abdominal x-ray 

obtained this morning report reviewed showed marked diffuse small bowel 

dilatation.  No free air.  Prominent air remains throughout the right colon.  

Suggestive of a continued severe ileus rather than a small bowel obstruction.





The abdomen remains distended reports tolerating the diet with no nausea no 

vomiting states has abdominal discomfort.  Reports abdomen more distended and 

bloated





Objective





- Vital Signs


Vital signs: 


 Vital Signs











Temp  98.1 F   06/18/18 06:25


 


Pulse  89   06/18/18 06:25


 


Resp  18   06/18/18 08:00


 


BP  137/94   06/18/18 06:25


 


Pulse Ox  97   06/18/18 06:25








 Intake & Output











 06/17/18 06/18/18 06/18/18





 18:59 06:59 18:59


 


Other:   


 


  Voiding Method Indwelling Catheter Toilet Toilet


 


  # Voids 400 2 


 


  # Bowel Movements 1  














- Exam





Physical exam


58-year-old male resting in bed states has been up ambulating in the hallway 

passing gas had a bowel movement


Lungs adequate air movement bilaterally no shortness of breath on room air


Heart S1-S2 audible regular


Abdomen firm distended few hypoactive bowel tones states he states no nausea no 

vomiting passing gas states had a bowel movement


Extremities no edema








- Labs


CBC & Chem 7: 


 06/15/18 06:20





 06/15/18 06:20


Labs: 


 Microbiology - Last 24 Hours (Table)











 06/14/18 09:10 Blood Culture - Preliminary





 Blood    No Growth after 96 hours














Assessment and Plan


Assessment: 





Impression


Present on admission abdominal pain nausea vomiting suspect due to partial 

bowel obstruction involving small and large bowel


CAT scan abdomen and pelvis obtained on admission report indicates mild acute 

diverticulitis mid to distal sigmoid colon causing partial small bowel 

obstruction involving small large bowel


Known coronary artery disease with prior coronary stenting done October 2017 on 

dual antiplatelet therapy Plavix and aspirin


History of tobacco abuse recently quit


A recent colonoscopy to the sigmoid colon with flexible sigmoidoscopy done 

February 2018 showed scattered diverticulosis of the sigmoid colon with severe 

torturous sigmoid colon with no evidence of an active bleed as part of a workup 

for acute blood loss anemia


A recent EGD February 2018 no evidence of an active duodenitis, no duodenal 

ulcer: Gastric ulcer


Abdominal x-ray done on the 18th findings are suggestive of severe ileus rather 

than a small bowel obstruction











Plan


Aspirin Plavix  hold today await further surgical recommendations by Dr. Desai


Full liquid diet


Encourage ambulation


Bowel rest


IV fluid for hydration


Monitor labs


DVT and GI prophylaxis


Pain control


Further surgical recommendations pending will follow with you











Progress note dictated for Dr. Desai rounding on behalf of Dr. cordova


The above impression and plan of care have been discussed and directed by 

signing physician. Marlee Simon nurse practitioner acting as scribe for signing 

physician.





<Eliz Desai N - Last Filed: 06/18/18 21:31>





Objective





- Vital Signs


Vital signs: 


 Vital Signs











Temp  97.3 F L  06/18/18 15:00


 


Pulse  86   06/18/18 15:00


 


Resp  16   06/18/18 16:35


 


BP  158/96   06/18/18 15:00


 


Pulse Ox  96   06/18/18 15:00








 Intake & Output











 06/18/18 06/18/18 06/19/18





 06:59 18:59 06:59


 


Intake Total  200 


 


Balance  200 


 


Weight  74.843 kg 


 


Intake:   


 


  Oral  200 


 


Other:   


 


  Voiding Method Toilet Toilet 


 


  # Voids 2 5 














- Labs


CBC & Chem 7: 


 06/15/18 06:20





 06/15/18 06:20


Labs: 


 Microbiology - Last 24 Hours (Table)











 06/14/18 09:10 Blood Culture - Preliminary





 Blood    No Growth after 96 hours

## 2018-06-18 NOTE — XR
EXAMINATION TYPE: XR abdomen 2V

 

DATE OF EXAM: 6/18/2018

 

CLINICAL DATA:  58-year-old male follow-up ileus, history of bowel obstruction, PHH

 

COMPARISON:  6/14/2018 and CT 6/15/2018

 

FINDINGS:

 

Diffuse bowel dilatation remains with small bowel loops measuring up to 6.5 cm. Air-filled right ligia
colon is also visualized measuring up to 9.1 cm along the proximal transverse colon. Multiple air-flu
id levels are present throughout.  No evidence for free intraperitoneal air below the hemidiaphragms.


 

IMPRESSION:

 

1. Continued marked diffuse small bowel dilatation (6.5 cm). Prominent air also remains throughout th
e right hemicolon (9.1 cm). Given the presence of colonic gas, findings are suggestive of a continued
 severe ileus rather than small bowel obstruction.

2. No free air seen below the hemidiaphragms on the upright view.

## 2018-06-18 NOTE — XR
EXAMINATION TYPE: XR chest 1V portable

 

DATE OF EXAM: 6/18/2018

 

COMPARISON: Today

 

HISTORY: Check tube placement

 

TECHNIQUE: Single frontal view of the chest is obtained.

 

FINDINGS:  There is no heart failure. There is nasogastric tube with the tip probably in the body of 
the stomach. There is probably a small infiltrate in the medial right lower lobe. The other lung fiel
ds are clear. There is poor aspiration.

 

IMPRESSION:  Small infiltrate at the medial right lung base. Nasogastric tube is in good position.

## 2018-06-19 LAB
ANION GAP SERPL CALC-SCNC: 15 MMOL/L
BASOPHILS # BLD AUTO: 0 K/UL (ref 0–0.2)
BASOPHILS NFR BLD AUTO: 0 %
BUN SERPL-SCNC: 4 MG/DL (ref 9–20)
CALCIUM SPEC-MCNC: 8.8 MG/DL (ref 8.4–10.2)
CHLORIDE SERPL-SCNC: 102 MMOL/L (ref 98–107)
CO2 SERPL-SCNC: 19 MMOL/L (ref 22–30)
EOSINOPHIL # BLD AUTO: 0.1 K/UL (ref 0–0.7)
EOSINOPHIL NFR BLD AUTO: 1 %
ERYTHROCYTE [DISTWIDTH] IN BLOOD BY AUTOMATED COUNT: 4.41 M/UL (ref 4.3–5.9)
ERYTHROCYTE [DISTWIDTH] IN BLOOD: 13.3 % (ref 11.5–15.5)
GLUCOSE SERPL-MCNC: 77 MG/DL (ref 74–99)
HCT VFR BLD AUTO: 40.7 % (ref 39–53)
HGB BLD-MCNC: 13.1 GM/DL (ref 13–17.5)
LYMPHOCYTES # SPEC AUTO: 1.3 K/UL (ref 1–4.8)
LYMPHOCYTES NFR SPEC AUTO: 15 %
MAGNESIUM SPEC-SCNC: 1.8 MG/DL (ref 1.6–2.3)
MCH RBC QN AUTO: 29.8 PG (ref 25–35)
MCHC RBC AUTO-ENTMCNC: 32.3 G/DL (ref 31–37)
MCV RBC AUTO: 92.5 FL (ref 80–100)
MONOCYTES # BLD AUTO: 0.7 K/UL (ref 0–1)
MONOCYTES NFR BLD AUTO: 8 %
NEUTROPHILS # BLD AUTO: 6.5 K/UL (ref 1.3–7.7)
NEUTROPHILS NFR BLD AUTO: 75 %
PLATELET # BLD AUTO: 470 K/UL (ref 150–450)
POTASSIUM SERPL-SCNC: 4 MMOL/L (ref 3.5–5.1)
SODIUM SERPL-SCNC: 136 MMOL/L (ref 137–145)
TRIGL SERPL-MCNC: 62 MG/DL (ref ?–150)
WBC # BLD AUTO: 8.7 K/UL (ref 3.8–10.6)

## 2018-06-19 RX ADMIN — HYDROMORPHONE HYDROCHLORIDE PRN MG: 1 INJECTION, SOLUTION INTRAMUSCULAR; INTRAVENOUS; SUBCUTANEOUS at 21:59

## 2018-06-19 RX ADMIN — ATORVASTATIN CALCIUM SCH MG: 80 TABLET, FILM COATED ORAL at 08:59

## 2018-06-19 RX ADMIN — DEXTROSE, SOYBEAN OIL, ELECTROLYTES, LYSINE, PHENYLALANINE, LEUCINE, VALINE, THREONINE, METHIONINE, ISOLEUCINE, TRYPTOPHAN, ALANINE, ARGININE, GLYCINE, PROLINE, HISTIDINE, GLUTAMIC ACID, SERINE, ASPARTIC ACID AND TYROSINE SCH MLS/HR
9.8; 3.9; 239; 174; 147; 96; 29; 263; 231; 231; 213; 164; 164; 164; 55; 467; 330; 231; 199; 199; 164; 131; 99; 6.7 INJECTION, EMULSION INTRAVENOUS at 18:39

## 2018-06-19 RX ADMIN — METOCLOPRAMIDE PRN MG: 5 INJECTION, SOLUTION INTRAMUSCULAR; INTRAVENOUS at 02:22

## 2018-06-19 RX ADMIN — METRONIDAZOLE SCH MLS/HR: 500 INJECTION, SOLUTION INTRAVENOUS at 11:56

## 2018-06-19 RX ADMIN — HYDROCODONE BITARTRATE AND ACETAMINOPHEN PRN EACH: 7.5; 325 TABLET ORAL at 09:09

## 2018-06-19 RX ADMIN — METRONIDAZOLE SCH MLS/HR: 500 INJECTION, SOLUTION INTRAVENOUS at 18:40

## 2018-06-19 RX ADMIN — CEFAZOLIN SCH: 330 INJECTION, POWDER, FOR SOLUTION INTRAMUSCULAR; INTRAVENOUS at 15:00

## 2018-06-19 RX ADMIN — ENOXAPARIN SODIUM SCH MG: 30 INJECTION SUBCUTANEOUS at 21:58

## 2018-06-19 RX ADMIN — CEFAZOLIN SCH MLS/HR: 330 INJECTION, POWDER, FOR SOLUTION INTRAMUSCULAR; INTRAVENOUS at 14:24

## 2018-06-19 RX ADMIN — CEFAZOLIN SCH: 330 INJECTION, POWDER, FOR SOLUTION INTRAMUSCULAR; INTRAVENOUS at 15:02

## 2018-06-19 RX ADMIN — HYDROMORPHONE HYDROCHLORIDE PRN MG: 1 INJECTION, SOLUTION INTRAMUSCULAR; INTRAVENOUS; SUBCUTANEOUS at 16:44

## 2018-06-19 RX ADMIN — CEFAZOLIN SCH: 330 INJECTION, POWDER, FOR SOLUTION INTRAMUSCULAR; INTRAVENOUS at 09:56

## 2018-06-19 RX ADMIN — CEFAZOLIN SCH: 330 INJECTION, POWDER, FOR SOLUTION INTRAMUSCULAR; INTRAVENOUS at 02:28

## 2018-06-19 RX ADMIN — CEFAZOLIN SCH MLS/HR: 330 INJECTION, POWDER, FOR SOLUTION INTRAMUSCULAR; INTRAVENOUS at 06:13

## 2018-06-19 RX ADMIN — FAMOTIDINE SCH MG: 10 INJECTION, SOLUTION INTRAVENOUS at 21:58

## 2018-06-19 RX ADMIN — LEVOFLOXACIN SCH MLS/HR: 750 INJECTION, SOLUTION INTRAVENOUS at 08:59

## 2018-06-19 RX ADMIN — CEFAZOLIN SCH: 330 INJECTION, POWDER, FOR SOLUTION INTRAMUSCULAR; INTRAVENOUS at 19:47

## 2018-06-19 RX ADMIN — METRONIDAZOLE SCH MLS/HR: 500 INJECTION, SOLUTION INTRAVENOUS at 06:13

## 2018-06-19 RX ADMIN — METOPROLOL TARTRATE SCH MG: 25 TABLET, FILM COATED ORAL at 08:59

## 2018-06-19 RX ADMIN — HYDROMORPHONE HYDROCHLORIDE PRN MG: 1 INJECTION, SOLUTION INTRAMUSCULAR; INTRAVENOUS; SUBCUTANEOUS at 12:13

## 2018-06-19 NOTE — P.PN
Subjective


Progress Note Date: 06/19/18


Progress note  being dictated for Dr. Lopez














Interval history: This is a 58-year-old gentleman admitted with partial  bowel 

obstruction involving both small and large bowel and multiple other medical 

issues.  NG tube and Londono catheter discontinued yesterday.  Reports multiple 

bowel movements, yet abdomen remains distended.  Denies abdominal tenderness.  

Follow-up Abdominal x-ray reporting continued marked diffuse small bowel 

dilatation, prominent air throughout the right;, suggestive of severe ileus 

rather than small bowel obstruction and no free intraperitoneal air below the 

hemidiaphragms .complains of shortness of breath, suspect related to abdominal 

distention, chest x-ray ordered. Voiding without difficulty.  Complains of 

nausea, on clear liquid diet as per surgery.  Afebrile.  Blood cultures 

negative.











06/19/2018 increased abdominal pain during the night and NG tube reinserted as 

per surgery. Abdomen remains firm and distended, with 450 MLS dark brownish 

drainage over the last 8 hours.positive fluctuating abdominal pain.Diagnostic 

laparoscopic lysis of adhesions recommend per surgery.  Aspirin, Plavix placed 

on hold, maintained on Lovenox.  PPN nitiated.  Maintained on IV fluid 

hydration. Cardiology consulted regarding anticoagulation recommendations/ 

surgery.












































Objective





- Vital Signs


Vital signs: 


 Vital Signs











Temp  98.1 F   06/19/18 14:11


 


Pulse  95   06/19/18 14:11


 


Resp  18   06/19/18 14:11


 


BP  161/103   06/19/18 14:11


 


Pulse Ox  97   06/19/18 14:11








 Intake & Output











 06/18/18 06/19/18 06/19/18





 18:59 06:59 18:59


 


Intake Total 200  1000


 


Output Total  450 300


 


Balance 200 -450 700


 


Weight 74.843 kg  74.843 kg


 


Intake:   


 


  IV   1000


 


    Sodium Chloride 0.9% 1,   1000





    000 ml @ 125 mls/hr IV .   





    Q8H ISA Rx#:[id number]   


 


  Oral 200  


 


Output:   


 


  Gastric Drainage   300


 


  Urine  450 


 


Other:   


 


  Voiding Method Toilet Toilet 


 


  # Voids 5 1 


 


  # Bowel Movements  1 














- Exam


PHYSICAL EXAM:


VITAL SIGNS: [As above]


GENERAL: Sitting up in bed, tired appearing


HEENT:  Conjunctivae normal. eyes normal.  Oral mucosa dry.  NG tube to low 

intermittent suction


NECK:  No JVD. No thyroid enlargement. No LNs


CARDIOVASCULAR:  S1, S2 muffled. No murmur


RESPIRATION: Breath sounds diminished in the bases. No rhonchi or crackles. No 

bronchial breathing.


ABDOMEN:  Firm, distended, diffuse tenderness.  Occasional Hypoactive bowel 

sounds.  Mild guarding. no masses palpable.


LEGS:  No edema. no swelling 


PSYCHIATRY: Alert and oriented -3, mood and affect normal.


NERVOUS SYSTEM:  Cranial N 2-12 grossly normal. Moves all 4 limbs.  Diffuse 

weakness No focal deficits. 














- Labs


CBC & Chem 7: 


 06/19/18 08:52





 06/19/18 08:52


Labs: 


 Abnormal Lab Results - Last 24 Hours (Table)











  06/19/18 06/19/18 Range/Units





  08:52 08:52 


 


Plt Count   470 H  (150-450)  k/uL


 


Sodium  136 L   (137-145)  mmol/L


 


Carbon Dioxide  19 L   (22-30)  mmol/L


 


BUN  4 L   (9-20)  mg/dL








 Microbiology - Last 24 Hours (Table)











 06/14/18 09:10 Blood Culture - Preliminary





 Blood    No Growth after 120 hours














Assessment and Plan


Assessment: 


1.  Acute severe ileus with  Acute partial bowel obstruction, involving the 

small and large bowel with possible mild acute diverticulitis, development 

severly dilated small bowel per computed tomography scan


2.  Recent colonoscopy 2/2018 reporting scattered diverticulosis of sigmoid 

colon with severe tortuous sigmoid colon.


2.  CAD, stenting in October 2017 on dual antiplatelet therapy-currently on hold








Plan: Continue current medication regime ,monitoring and symptomatic treatment.

  Maintain IV fluid hydration.  Further discussion between surgery, cardiology 

and Dr. Lopez regarding surgery and anticoagulation. Pain management as per 

surgery.  Prognosis guarded given multiple complex medical issues.














The impression and plan of care has been dictated as directed.





:


I performed a history and examination of this patient,  discussed the same with 

the dictator.  I agree with the dictator's note ,documented as a scribe.  Any 

additional findings or plans will be noted.

## 2018-06-19 NOTE — P.PN
<Areli Simondanielle CARRERA - Last Filed: 06/19/18 12:28>





Subjective


Progress Note Date: 06/19/18





58-year-old male seen this morning wife at bedside nasal gastric tube to 

suction 100 mL for the last 2 hours brownish secretions significant abdominal 

distention patient states she's having significant more abdominal pain.  No 

stool.  Abdomen is firm and distended.  Patient does have a history of coronary 

artery disease with prior coronary stenting done on October 2017.  on anti-dual 

platelet therapy Plavix and aspirin   Plavix is been held last dose yesterday 

June 18.  Plavix was restarted over the weekend.





Patients being followed by surgery for a computed tomography scan of the 

abdomen and pelvis with colon is collapsed with severely dilated small bowel.  

Nasogastric tube was placed yesterday to help relieve the pain.  Surgical 

service had recommended a diagnostic laparoscopic lysis of adhesions and bowel 

obstruction in a virgin abdomen.  This is been held secondary to the Plavix 

being restarted over the weekend.  Cardiology indicates patient is to be off 

Plavix for at least 5 days.  Cardiology reconsultation requested





Objective





- Vital Signs


Vital signs: 


 Vital Signs











Temp  96.4 F L  06/19/18 05:45


 


Pulse  107 H  06/19/18 05:45


 


Resp  18   06/19/18 05:45


 


BP  179/98   06/19/18 05:45


 


Pulse Ox  96   06/19/18 05:45








 Intake & Output











 06/18/18 06/19/18 06/19/18





 18:59 06:59 18:59


 


Intake Total 200  


 


Output Total  450 


 


Balance 200 -450 


 


Weight 74.843 kg  


 


Intake:   


 


  Oral 200  


 


Output:   


 


  Urine  450 


 


Other:   


 


  Voiding Method Toilet Toilet 


 


  # Voids 5 1 


 


  # Bowel Movements  1 














- Exam





Physical exam


58-year-old male resting in bed states has significant abdominal pain " comes 

in waves"


Lungs diminished at the bases otherwise adequate air movement states feels 

slightly short of breath sats are documented 94% on room air


Heart S1-S2 audible and regular monitor sinus sinus tach heart rate 110 denies 

chest pain when questioning


Abdomen firm and distended.  Few Hypoactive bowel tones nasal gastric tube to 

suction 450 past 8 hours reportedly urinating no difficulty reports a nausea 

sensation no active emesis


Extremities no edema noted to lower extreme





- Labs


CBC & Chem 7: 


 06/19/18 08:52





 06/19/18 08:52


Labs: 


 Abnormal Lab Results - Last 24 Hours (Table)











  06/19/18 06/19/18 Range/Units





  08:52 08:52 


 


Plt Count   470 H  (150-450)  k/uL


 


Sodium  136 L   (137-145)  mmol/L


 


Carbon Dioxide  19 L   (22-30)  mmol/L


 


BUN  4 L   (9-20)  mg/dL








 Microbiology - Last 24 Hours (Table)











 06/14/18 09:10 Blood Culture - Preliminary





 Blood    No Growth after 120 hours














Assessment and Plan


Assessment: 





Impression


Present on admission abdominal pain nausea vomiting suspect due to partial 

bowel obstruction involving small and large bowel


CAT scan abdomen and pelvis obtained on admission report indicates mild acute 

diverticulitis mid to distal sigmoid colon causing partial small bowel 

obstruction involving small large bowel


Known coronary artery disease with prior coronary stenting done October 2017 on 

dual antiplatelet therapy Plavix and aspirin


History of tobacco abuse recently quit


A recent colonoscopy to the sigmoid colon with flexible sigmoidoscopy done 

February 2018 showed scattered diverticulosis of the sigmoid colon with severe 

torturous sigmoid colon with no evidence of an active bleed as part of a workup 

for acute blood loss anemia


A recent EGD February 2018 no evidence of an active duodenitis, no duodenal 

ulcer: Gastric ulcer


Abdominal x-ray done on the 18th findings are suggestive of severe ileus 


CAT scan abdomen and pelvis: Collapse was severely dilated small bowel








Plan


Aspirin Plavix on hold last dose June 18


Continue nasal gastric tube to suction


Reconsult cardiology 


IV fluid for hydration


Monitor labs


DVT and GI prophylaxis


Pain control


PPN for nutritional support 


Patient will need a diagnostic laparoscopic probable lysis of adhesions when 

off Plavix for 5 days 


Further surgical recommendations pending will follow with you











Progress note dictated for Dr. Desai rounding on behalf of Dr. cordova








The above impression and plan of care have been discussed and directed by 

signing physician. Marlee Simon nurse practitioner acting as scribe for signing 

physician.





<Eliz Desai N - Last Filed: 06/19/18 18:16>





Objective





- Vital Signs


Vital signs: 


 Vital Signs











Temp  98.1 F   06/19/18 14:11


 


Pulse  95   06/19/18 14:11


 


Resp  18   06/19/18 14:11


 


BP  161/103   06/19/18 14:11


 


Pulse Ox  97   06/19/18 14:11








 Intake & Output











 06/18/18 06/19/18 06/19/18





 18:59 06:59 18:59


 


Intake Total 200  1000


 


Output Total  450 300


 


Balance 200 -450 700


 


Weight 74.843 kg  74.843 kg


 


Intake:   


 


  IV   1000


 


    Sodium Chloride 0.9% 1,   1000





    000 ml @ 125 mls/hr IV .   





    Q8H ISA Rx#:[id number]   


 


  Oral 200  


 


Output:   


 


  Gastric Drainage   300


 


  Urine  450 


 


Other:   


 


  Voiding Method Toilet Toilet 


 


  # Voids 5 1 


 


  # Bowel Movements  1 














- Labs


CBC & Chem 7: 


 06/19/18 08:52





 06/19/18 08:52


Labs: 


 Abnormal Lab Results - Last 24 Hours (Table)











  06/19/18 06/19/18 Range/Units





  08:52 08:52 


 


Plt Count   470 H  (150-450)  k/uL


 


Sodium  136 L   (137-145)  mmol/L


 


Carbon Dioxide  19 L   (22-30)  mmol/L


 


BUN  4 L   (9-20)  mg/dL








 Microbiology - Last 24 Hours (Table)











 06/14/18 09:10 Blood Culture - Preliminary





 Blood    No Growth after 120 hours














Assessment and Plan


Plan: 





Patient's surgery now on hold due to recent Plavix and aspirin. Moderate output 

from NGT dark bilious over 2.5 L consistent with suspected bowel obstruction.  

Agree with TPN/PPN. Surgery scheduled for 5 days following off anti-platelet 

therapy. Will plan for immediate resumption of therapy once surgery completed.

## 2018-06-20 LAB
ALBUMIN SERPL-MCNC: 2.5 G/DL (ref 3.5–5)
ALP SERPL-CCNC: 46 U/L (ref 38–126)
ALT SERPL-CCNC: 34 U/L (ref 21–72)
ANION GAP SERPL CALC-SCNC: 8 MMOL/L
APTT BLD: 24.5 SEC (ref 22–30)
AST SERPL-CCNC: 29 U/L (ref 17–59)
BASOPHILS # BLD AUTO: 0 K/UL (ref 0–0.2)
BASOPHILS NFR BLD AUTO: 0 %
BUN SERPL-SCNC: 3 MG/DL (ref 9–20)
CALCIUM SPEC-MCNC: 8.6 MG/DL (ref 8.4–10.2)
CHLORIDE SERPL-SCNC: 107 MMOL/L (ref 98–107)
CO2 SERPL-SCNC: 22 MMOL/L (ref 22–30)
EOSINOPHIL # BLD AUTO: 0.1 K/UL (ref 0–0.7)
EOSINOPHIL NFR BLD AUTO: 2 %
ERYTHROCYTE [DISTWIDTH] IN BLOOD BY AUTOMATED COUNT: 4.2 M/UL (ref 4.3–5.9)
ERYTHROCYTE [DISTWIDTH] IN BLOOD: 13.4 % (ref 11.5–15.5)
GLUCOSE BLD-MCNC: 109 MG/DL (ref 75–99)
GLUCOSE BLD-MCNC: 77 MG/DL (ref 75–99)
GLUCOSE BLD-MCNC: 98 MG/DL (ref 75–99)
GLUCOSE SERPL-MCNC: 110 MG/DL (ref 74–99)
HCT VFR BLD AUTO: 37.9 % (ref 39–53)
HGB BLD-MCNC: 12.2 GM/DL (ref 13–17.5)
INR PPP: 1.1 (ref ?–1.2)
LYMPHOCYTES # SPEC AUTO: 1.8 K/UL (ref 1–4.8)
LYMPHOCYTES NFR SPEC AUTO: 23 %
MAGNESIUM SPEC-SCNC: 2 MG/DL (ref 1.6–2.3)
MCH RBC QN AUTO: 29 PG (ref 25–35)
MCHC RBC AUTO-ENTMCNC: 32.1 G/DL (ref 31–37)
MCV RBC AUTO: 90.3 FL (ref 80–100)
MONOCYTES # BLD AUTO: 0.8 K/UL (ref 0–1)
MONOCYTES NFR BLD AUTO: 10 %
NEUTROPHILS # BLD AUTO: 4.9 K/UL (ref 1.3–7.7)
NEUTROPHILS NFR BLD AUTO: 64 %
PLATELET # BLD AUTO: 483 K/UL (ref 150–450)
POTASSIUM SERPL-SCNC: 3.7 MMOL/L (ref 3.5–5.1)
PROT SERPL-MCNC: 5.2 G/DL (ref 6.3–8.2)
PT BLD: 10.4 SEC (ref 9–12)
SODIUM SERPL-SCNC: 137 MMOL/L (ref 137–145)
WBC # BLD AUTO: 7.7 K/UL (ref 3.8–10.6)

## 2018-06-20 RX ADMIN — HYDROMORPHONE HYDROCHLORIDE PRN MG: 1 INJECTION, SOLUTION INTRAMUSCULAR; INTRAVENOUS; SUBCUTANEOUS at 18:10

## 2018-06-20 RX ADMIN — POTASSIUM CHLORIDE SCH MLS/HR: 7.46 INJECTION, SOLUTION INTRAVENOUS at 12:16

## 2018-06-20 RX ADMIN — FAMOTIDINE SCH MG: 10 INJECTION, SOLUTION INTRAVENOUS at 08:38

## 2018-06-20 RX ADMIN — HYDROMORPHONE HYDROCHLORIDE PRN MG: 1 INJECTION, SOLUTION INTRAMUSCULAR; INTRAVENOUS; SUBCUTANEOUS at 22:41

## 2018-06-20 RX ADMIN — CEFAZOLIN SCH MLS/HR: 330 INJECTION, POWDER, FOR SOLUTION INTRAMUSCULAR; INTRAVENOUS at 21:01

## 2018-06-20 RX ADMIN — CEFAZOLIN SCH MLS/HR: 330 INJECTION, POWDER, FOR SOLUTION INTRAMUSCULAR; INTRAVENOUS at 14:33

## 2018-06-20 RX ADMIN — LEVOFLOXACIN SCH MLS/HR: 750 INJECTION, SOLUTION INTRAVENOUS at 08:36

## 2018-06-20 RX ADMIN — METRONIDAZOLE SCH MLS/HR: 500 INJECTION, SOLUTION INTRAVENOUS at 00:23

## 2018-06-20 RX ADMIN — FAMOTIDINE SCH MG: 10 INJECTION, SOLUTION INTRAVENOUS at 20:55

## 2018-06-20 RX ADMIN — METRONIDAZOLE SCH MLS/HR: 500 INJECTION, SOLUTION INTRAVENOUS at 23:42

## 2018-06-20 RX ADMIN — LISINOPRIL SCH MG: 5 TABLET ORAL at 14:34

## 2018-06-20 RX ADMIN — ENOXAPARIN SODIUM SCH MG: 30 INJECTION SUBCUTANEOUS at 20:55

## 2018-06-20 RX ADMIN — POTASSIUM CHLORIDE SCH MLS/HR: 7.46 INJECTION, SOLUTION INTRAVENOUS at 14:33

## 2018-06-20 RX ADMIN — HYDROMORPHONE HYDROCHLORIDE PRN MG: 1 INJECTION, SOLUTION INTRAMUSCULAR; INTRAVENOUS; SUBCUTANEOUS at 03:05

## 2018-06-20 RX ADMIN — METRONIDAZOLE SCH MLS/HR: 500 INJECTION, SOLUTION INTRAVENOUS at 16:57

## 2018-06-20 RX ADMIN — HYDROMORPHONE HYDROCHLORIDE PRN MG: 1 INJECTION, SOLUTION INTRAMUSCULAR; INTRAVENOUS; SUBCUTANEOUS at 13:48

## 2018-06-20 RX ADMIN — METOPROLOL TARTRATE SCH MG: 25 TABLET, FILM COATED ORAL at 08:38

## 2018-06-20 RX ADMIN — HYDROMORPHONE HYDROCHLORIDE PRN MG: 1 INJECTION, SOLUTION INTRAMUSCULAR; INTRAVENOUS; SUBCUTANEOUS at 08:36

## 2018-06-20 RX ADMIN — METRONIDAZOLE SCH MLS/HR: 500 INJECTION, SOLUTION INTRAVENOUS at 12:15

## 2018-06-20 RX ADMIN — METOPROLOL TARTRATE SCH MG: 25 TABLET, FILM COATED ORAL at 20:56

## 2018-06-20 RX ADMIN — DEXTROSE, SOYBEAN OIL, ELECTROLYTES, LYSINE, PHENYLALANINE, LEUCINE, VALINE, THREONINE, METHIONINE, ISOLEUCINE, TRYPTOPHAN, ALANINE, ARGININE, GLYCINE, PROLINE, HISTIDINE, GLUTAMIC ACID, SERINE, ASPARTIC ACID AND TYROSINE SCH
9.8; 3.9; 239; 174; 147; 96; 29; 263; 231; 231; 213; 164; 164; 164; 55; 467; 330; 231; 199; 199; 164; 131; 99; 6.7 INJECTION, EMULSION INTRAVENOUS at 16:57

## 2018-06-20 RX ADMIN — METRONIDAZOLE SCH MLS/HR: 500 INJECTION, SOLUTION INTRAVENOUS at 06:37

## 2018-06-20 RX ADMIN — CEFAZOLIN SCH: 330 INJECTION, POWDER, FOR SOLUTION INTRAMUSCULAR; INTRAVENOUS at 06:35

## 2018-06-20 RX ADMIN — ATORVASTATIN CALCIUM SCH MG: 80 TABLET, FILM COATED ORAL at 08:38

## 2018-06-20 RX ADMIN — ENOXAPARIN SODIUM SCH MG: 30 INJECTION SUBCUTANEOUS at 12:16

## 2018-06-20 RX ADMIN — METOCLOPRAMIDE PRN MG: 5 INJECTION, SOLUTION INTRAMUSCULAR; INTRAVENOUS at 19:45

## 2018-06-20 NOTE — P.PN
Subjective


Progress Note Date: 06/20/18


Progress note  being dictated for Dr. Lopez














Interval history: This is a 58-year-old gentleman admitted with partial  bowel 

obstruction involving both small and large bowel and multiple other medical 

issues.  NG tube and Londono catheter discontinued yesterday.  Reports multiple 

bowel movements, yet abdomen remains distended.  Denies abdominal tenderness.  

Follow-up Abdominal x-ray reporting continued marked diffuse small bowel 

dilatation, prominent air throughout the right;, suggestive of severe ileus 

rather than small bowel obstruction and no free intraperitoneal air below the 

hemidiaphragms .complains of shortness of breath, suspect related to abdominal 

distention, chest x-ray ordered. Voiding without difficulty.  Complains of 

nausea, on clear liquid diet as per surgery.  Afebrile.  Blood cultures 

negative.











06/19/2018 increased abdominal pain during the night and NG tube reinserted as 

per surgery. Abdomen remains firm and distended, with 450 MLS dark brownish 

drainage over the last 8 hours.positive fluctuating abdominal pain.Diagnostic 

laparoscopic lysis of adhesions recommend per surgery.  Aspirin, Plavix placed 

on hold, maintained on Lovenox.  PPN nitiated.  Maintained on IV fluid 

hydration. Cardiology consulted regarding anticoagulation recommendations/ 

surgery.














06/20/2018 receiving IV fluid hydration. PPN unavailable from pharmacy, TPN 

ordered as per surgery.  Scheduled for PICC line placement. Patient ambulating 

to and from bathroom, complaining of exertional dyspnea.  Chest x-ray reporting 

possible early pneumonia right lung base, mild atelectasis. Minimal improvement 

in abdominal distention.  Reports mild improvement in abdominal pain.  NG  

drainage of around 800ml /8hrs. Telemetry reporting sinus rhythm to sinus tach, 

low 100s.  Plavix and aspirin remain on hold for upcoming surgery, Saturday.  

Afebrile












































Objective





- Vital Signs


Vital signs: 


 Vital Signs











Temp  98.3 F   06/20/18 14:46


 


Pulse  84   06/20/18 14:46


 


Resp  17   06/20/18 14:46


 


BP  145/90   06/20/18 14:46


 


Pulse Ox  98   06/20/18 14:46








 Intake & Output











 06/19/18 06/20/18 06/20/18





 18:59 06:59 18:59


 


Intake Total 1000  


 


Output Total 800 200 300


 


Balance 200 -200 -300


 


Weight 74.843 kg  


 


Intake:   


 


  IV 1000  


 


    Sodium Chloride 0.9% 1, 1000  





    000 ml @ 125 mls/hr IV .   





    Q8H [facility] Rx#:[id number]   


 


Output:   


 


  Gastric Drainage 800  300


 


  Urine  200 


 


Other:   


 


  Voiding Method  Toilet 


 


  # Voids  1 














- Exam


PHYSICAL EXAM:


VITAL SIGNS: [As above]


GENERAL: Sitting up in bed, tired appearing


HEENT:  Conjunctivae normal. eyes normal.  Oral mucosa dry.  NG tube to low 

intermittent suction


NECK:  No JVD. No thyroid enlargement. No LNs


CARDIOVASCULAR:  S1, S2 muffled. No murmur


RESPIRATION: Breath sounds diminished in the bases. No rhonchi or crackles.  No 

wheezing


ABDOMEN:  Firm, distended, diffuse tenderness. Hypoactive bowel sounds.  Mild 

guarding. no masses palpable.


LEGS:  No edema. no swelling 


PSYCHIATRY: Alert and oriented -3, mood and affect normal.


NERVOUS SYSTEM:  Cranial N 2-12 grossly normal. Moves all 4 limbs.  Diffuse 

weakness No focal deficits. 














- Labs


CBC & Chem 7: 


 06/20/18 07:47





 06/20/18 07:47


Labs: 


 Abnormal Lab Results - Last 24 Hours (Table)











  06/20/18 06/20/18 06/20/18 Range/Units





  07:21 07:47 07:47 


 


RBC   4.20 L   (4.30-5.90)  m/uL


 


Hgb   12.2 L   (13.0-17.5)  gm/dL


 


Hct   37.9 L   (39.0-53.0)  %


 


Plt Count   483 H   (150-450)  k/uL


 


BUN    3 L  (9-20)  mg/dL


 


Creatinine    0.50 L  (0.66-1.25)  mg/dL


 


Glucose    110 H  (74-99)  mg/dL


 


POC Glucose (mg/dL)  109 H    (75-99)  mg/dL


 


Total Bilirubin    0.1 L  (0.2-1.3)  mg/dL


 


Total Protein    5.2 L  (6.3-8.2)  g/dL


 


Albumin    2.5 L  (3.5-5.0)  g/dL








 Microbiology - Last 24 Hours (Table)











 06/14/18 09:10 Blood Culture - Final





 Blood    No Growth after 144 hours














Assessment and Plan


Assessment: 


1.  Acute severe ileus with  Acute partial bowel obstruction, involving the 

small and large bowel with possible mild acute diverticulitis, development 

severly dilated small bowel per computed tomography scan


2.  Recent colonoscopy 2/2018 reporting scattered diverticulosis of sigmoid 

colon with severe tortuous sigmoid colon.


3.  CAD, stenting in October 2017 on dual antiplatelet therapy-currently on hold


4.  Chronic daily alcohol use








Plan: Continue current medication regime ,monitoring and symptomatic treatment.

  Maintain IV fluid hydration, antibiotics.  Aggressive pulmonary toileting 

with incentive spirometer reinforced.  Pain management as per surgery.  

Anticoagulation as per cardiology and surgery.  PICC line placement pending. 

CIWA protocol in place. Prognosis guarded given multiple complex medical issues.














The impression and plan of care has been dictated as directed.





:


I performed a history and examination of this patient,  discussed the same with 

the dictator.  I agree with the dictator's note ,documented as a scribe.  Any 

additional findings or plans will be noted.

## 2018-06-20 NOTE — P.PN
Subjective


Progress Note Date: 06/20/18





He reports passing flatus. He had moderate output from his NGT. His abdominal 

pain is improving. Abdominal distention is also improving tonight. He is 

pending parenteral nutrition as midline was not placed today. 





Objective





- Vital Signs


Vital signs: 


 Vital Signs











Temp  98.3 F   06/20/18 14:46


 


Pulse  84   06/20/18 14:46


 


Resp  17   06/20/18 14:46


 


BP  145/90   06/20/18 14:46


 


Pulse Ox  98   06/20/18 14:46








 Intake & Output











 06/20/18 06/20/18 06/21/18





 06:59 18:59 06:59


 


Intake Total  1400 


 


Output Total 200 2175 175


 


Balance -200 -914 -103


 


Intake:   


 


  Intake, IV Titration  1400 





  Amount   


 


    Potassium Chloride 10 meq  200 





    In Water For Injection 1   





    100ml.bag @ 100 mls/hr   





    IVPB Q1H [facility] Rx#:   





    [id number]   


 


    Sodium Chloride 0.9% 1,  1000 





    000 ml @ 125 mls/hr IV .   





    Q8H [facility] Rx#:[id number]   


 


    metroNIDAZOLE-NS   200 





    mg In Saline 1 100ml.bag   





    @ 100 mls/hr IVPB Q6HR   





    [facility] Rx#:[id number]   


 


Output:   


 


  Gastric Drainage  550 


 


  Urine 200 1625 175


 


Other:   


 


  Voiding Method Toilet  


 


  # Voids 1  


 


  # Bowel Movements  0 














- Exam





ABDOMEN: Soft. Decrease abdominal distention. No diffuse peritonitis.


GENERAL:  Well developed and in no acute distress. Pleasant.


HEENT:  No sclera icterus. Extraocular movements grossly intact.  Moist buccal 

mucosa. Head is atraumatic, normocephalic. Hears conversational speech. No 

nasal drainage.


NECK:  Supple without lymphadenopathy. No JV distention.


CHEST:  Non-labored respirations and equal bilateral excursions. 


CARDIOVASCULAR:  Regular rate and rhythm.  Palpable 2+ radial pulses.


MUSCULOSKELETAL:  No clubbing, cyanosis or edema.


NEUROLOGIC:  No focal or lateralizing signs. 


PSYCH:  Appropriate affect.  Alert and oriented to person, place and time.


SKIN: Good skin turgor.  Well perfused.





- Labs


CBC & Chem 7: 


 06/20/18 07:47





 06/21/18 07:26


Labs: 


 Abnormal Lab Results - Last 24 Hours (Table)











  06/20/18 06/20/18 06/20/18 Range/Units





  07:21 07:47 07:47 


 


RBC   4.20 L   (4.30-5.90)  m/uL


 


Hgb   12.2 L   (13.0-17.5)  gm/dL


 


Hct   37.9 L   (39.0-53.0)  %


 


Plt Count   483 H   (150-450)  k/uL


 


BUN    3 L  (9-20)  mg/dL


 


Creatinine    0.50 L  (0.66-1.25)  mg/dL


 


Glucose    110 H  (74-99)  mg/dL


 


POC Glucose (mg/dL)  109 H    (75-99)  mg/dL


 


Total Bilirubin    0.1 L  (0.2-1.3)  mg/dL


 


Total Protein    5.2 L  (6.3-8.2)  g/dL


 


Albumin    2.5 L  (3.5-5.0)  g/dL








 Microbiology - Last 24 Hours (Table)











 06/14/18 09:10 Blood Culture - Final





 Blood    No Growth after 144 hours














Assessment and Plan


(1) Partial bowel obstruction


Current Visit: Yes   Status: Acute   Code(s): K56.600 - PARTIAL INTESTINAL 

OBSTRUCTION, UNSPECIFIED AS TO CAUSE   SNOMED Code(s): 32352591


   


Plan: 





1. Will proceed with diagnostic laparoscopy as bowel obstruction in a virgin 

abdomen is mechanical.


2. Agree with parenteral nutrition in the meantime.


3. Patient has history of rebounding with conservative management.


4. Appreciate cardiology input regarding antiplatelet therapy. 


5. Plan to resume antiplatelet therapy within 24 hrs of his anticipated 

procedure

## 2018-06-20 NOTE — P.PN
Subjective





Mr. Franco is a pleasant 58-year-old  male past medical history 

significant for coronary artery disease with angioplasty x3 of the RCA 10/2017 

he also had moderate disease of the circumflex artery and diffuse disease to 

LAD. His most recent catheterization was performed in December 2017 and 

revealed patent stents to RCA and no progression of disease to LAD or 

circumflex. He has been maintained on aspirin, plavix, lopressor and 

atorvastatin. He has presented to the hospital with abdominal pain and 

distension. CT performed revealed evidence of acute sigmoid diverticulitis and 

concern for colonic neoplasm as well as dilitation of the small bowel 

suggesting ileus. He has undergone serial CT's for ongoing evaluation per 

surgery. Most CT performed this morning obtained imaging of the lung bases and 

reveals a small right pleural effusion has developed. We have been asked to see 

him secondary to history of CAD. Surgery has recommended GI rest and NG tube 

with ongoing evaluation. Currently Dr. Luther has held anti-platelets should 

he require surgery. He denies symptoms of chest pain, shortness of breath, 

diaphoresis, palpitations or dizziness. He states he hasn't had a bowel 

movement in 3 days and is starting to feel some movement occur although he has 

not yet passed any gas. He denies bleeding from the rectum or hematemesis. 





EKG reveals sinus tachycardia heart rate 129 with right bundle branch block. 

This is not new. Consistent with old EKG from February. 


Laboratory data reviewed, hemoglobin 13.2 down from 16.2 yesterday., platelets 

301, sodium 136, potassium 4.5, creatinine 0.76. 


Current cardiac medications include aspirin 81 mg daily, atorvastatin 80 mg 

daily, Plavix 75 mg daily and Lopressor 25 mg daily.  He also takes Prilosec 

and vitamin D.


Most recent echocardiogram reveals preserved left ventricular systolic function 

with ejection fraction 50-55%, mild aortic regurgitation, mild MR and mild TR.





6/20/2018


We have been asked to see Mr. Franco again during this admission for 

recommendations on plavix and aspirin prior to surgery. Aspirin and plavix had 

initially been resumed per surgery with no imminent plans for surgical 

intervention. However, his abdomen is not improving with NG tube decompression 

and therefore surgery has been recommended per Dr. Desai. He continues to c/

o abdominal pain and distention with mild improvement. Yesterday while up to 

the bathroom his heart rate went up to 157. He states he has been noticing mild 

shortness of breath with exertion in the last couple of days. He denies chest 

pain, dizziness, PND or orthopnea. He is laying flat in bed at the time of my 

exam. Chest xray obtained this morning reveals mild atelectasis or early 

pneumonia right lung base.  Blood pressure 164/105 heart rate 101 afebrile 

maintaining oxygen saturation on room air.  Laboratory data reviewed, 

hemoglobin 12.2, platelets 483, sodium 137, potassium 3.7, creatinine 0.5, 

magnesium 2.0.  He is currently taking atorvastatin 80 mg daily, Lovenox 30 mg 

twice a day, metoprolol 25 mg daily.  Aspirin and Plavix have been held.





Objective





- Vital Signs


Vital signs: 


 Vital Signs











Temp  98.1 F   06/20/18 07:20


 


Pulse  101 H  06/20/18 07:20


 


Resp  17   06/20/18 07:20


 


BP  164/105   06/20/18 07:20


 


Pulse Ox  95   06/20/18 07:20








 Intake & Output











 06/19/18 06/20/18 06/20/18





 18:59 06:59 18:59


 


Intake Total 1000  


 


Output Total 800 200 300


 


Balance 200 -200 -300


 


Weight 74.843 kg  


 


Intake:   


 


  IV 1000  


 


    Sodium Chloride 0.9% 1, 1000  





    000 ml @ 125 mls/hr IV .   





    Q8H [facility] Rx#:[id number]   


 


Output:   


 


  Gastric Drainage 800  300


 


  Urine  200 


 


Other:   


 


  Voiding Method  Toilet 


 


  # Voids  1 














- Exam





GENERAL: Well-appearing, well-nourished and in no acute distress.


NECK: Supple without JVD or thyromegaly.


LUNGS: Breath sounds clear to auscultation bilaterally. Respiration equal and 

unlabored.  No wheezes, rales or rhonchi.


HEART: Regular rate and rhythm without murmurs, rubs or gallops. S1 and S2 

heard.


EXTREMITIES: Normal range of motion, no edema.  No clubbing or cyanosis. 

Peripheral pulses intact and strong.





- Labs


CBC & Chem 7: 


 06/20/18 07:47





 06/20/18 07:47


Labs: 


 Abnormal Lab Results - Last 24 Hours (Table)











  06/20/18 06/20/18 06/20/18 Range/Units





  07:21 07:47 07:47 


 


RBC   4.20 L   (4.30-5.90)  m/uL


 


Hgb   12.2 L   (13.0-17.5)  gm/dL


 


Hct   37.9 L   (39.0-53.0)  %


 


Plt Count   483 H   (150-450)  k/uL


 


BUN    3 L  (9-20)  mg/dL


 


Creatinine    0.50 L  (0.66-1.25)  mg/dL


 


Glucose    110 H  (74-99)  mg/dL


 


POC Glucose (mg/dL)  109 H    (75-99)  mg/dL


 


Total Bilirubin    0.1 L  (0.2-1.3)  mg/dL


 


Total Protein    5.2 L  (6.3-8.2)  g/dL


 


Albumin    2.5 L  (3.5-5.0)  g/dL








 Microbiology - Last 24 Hours (Table)











 06/14/18 09:10 Blood Culture - Final





 Blood    No Growth after 144 hours














Assessment and Plan


Assessment: 





ASSESSMENT


1.  Ileus, bowel rest and decompression with NG tube ongoing per surgery.


2.  Sigmoid diverticulitis, receiving IV antibiotics


3.  History of known coronary artery disease status post angioplasty October 2017 currently on dual anti-platelet therapy 


4.  Former tobacco use, quit 10/2017


5.  Chronic daily alcohol use








PLAN


Increase lopressor to BID and add lisinopril 10 mg daily. 


Dose of plavix and aspirin had been given over the weekend. Per nursing his 

last dose was Sunday. Surgical intervention should be 5 days from last dose of 

plavix unless absolutely emergent. There is no reversal agent. 


Ongoing telemetry monitoring. 


Incentive spirometer and deep breathing exercises recommended. 


 





Nurse Practitioner note has been reviewed, I agree with a documented findings 

and plan of care.  Patient was seen and examined.

## 2018-06-20 NOTE — XR
EXAMINATION TYPE: XR chest 1V portable

 

DATE OF EXAM: 6/20/2018

 

COMPARISON: NONE

 

INDICATION: Short of breath, small bowel obstruction

 

TECHNIQUE: Single frontal view of the chest is obtained.

 

FINDINGS:  

The heart size is normal.  

The pulmonary vasculature is normal.  

Mild infiltrate right lung base may be present. Nasogastric tube transverses the thorax the tip in le
ft upper quadrant of the abdomen. Dilated air-filled small bowel loops within the upper abdomen withi
n the field-of-view.  

 

 

IMPRESSION:  

1. Correlate for mild atelectasis or early pneumonia posterior right lung base

2. Findings which could be compatible with the patient's reported small bowel obstruction within the 
upper abdomen within the field-of-view.

## 2018-06-21 LAB
ANION GAP SERPL CALC-SCNC: 9 MMOL/L
BUN SERPL-SCNC: 3 MG/DL (ref 9–20)
CALCIUM SPEC-MCNC: 8.2 MG/DL (ref 8.4–10.2)
CHLORIDE SERPL-SCNC: 105 MMOL/L (ref 98–107)
CO2 SERPL-SCNC: 23 MMOL/L (ref 22–30)
GLUCOSE BLD-MCNC: 105 MG/DL (ref 75–99)
GLUCOSE BLD-MCNC: 109 MG/DL (ref 75–99)
GLUCOSE BLD-MCNC: 118 MG/DL (ref 75–99)
GLUCOSE BLD-MCNC: 68 MG/DL (ref 75–99)
GLUCOSE BLD-MCNC: 70 MG/DL (ref 75–99)
GLUCOSE BLD-MCNC: 71 MG/DL (ref 75–99)
GLUCOSE BLD-MCNC: 72 MG/DL (ref 75–99)
GLUCOSE BLD-MCNC: 77 MG/DL (ref 75–99)
GLUCOSE SERPL-MCNC: 94 MG/DL (ref 74–99)
MAGNESIUM SPEC-SCNC: 1.9 MG/DL (ref 1.6–2.3)
POTASSIUM SERPL-SCNC: 3.6 MMOL/L (ref 3.5–5.1)
SODIUM SERPL-SCNC: 137 MMOL/L (ref 137–145)

## 2018-06-21 PROCEDURE — 02HV33Z INSERTION OF INFUSION DEVICE INTO SUPERIOR VENA CAVA, PERCUTANEOUS APPROACH: ICD-10-PCS

## 2018-06-21 RX ADMIN — HYDROMORPHONE HYDROCHLORIDE PRN MG: 1 INJECTION, SOLUTION INTRAMUSCULAR; INTRAVENOUS; SUBCUTANEOUS at 21:45

## 2018-06-21 RX ADMIN — HYDROMORPHONE HYDROCHLORIDE PRN MG: 1 INJECTION, SOLUTION INTRAMUSCULAR; INTRAVENOUS; SUBCUTANEOUS at 04:03

## 2018-06-21 RX ADMIN — HYDROMORPHONE HYDROCHLORIDE PRN MG: 1 INJECTION, SOLUTION INTRAMUSCULAR; INTRAVENOUS; SUBCUTANEOUS at 17:32

## 2018-06-21 RX ADMIN — METRONIDAZOLE SCH MLS/HR: 500 INJECTION, SOLUTION INTRAVENOUS at 05:39

## 2018-06-21 RX ADMIN — METOPROLOL TARTRATE SCH MG: 25 TABLET, FILM COATED ORAL at 07:27

## 2018-06-21 RX ADMIN — METRONIDAZOLE SCH MLS/HR: 500 INJECTION, SOLUTION INTRAVENOUS at 12:23

## 2018-06-21 RX ADMIN — METOPROLOL TARTRATE SCH MG: 25 TABLET, FILM COATED ORAL at 21:50

## 2018-06-21 RX ADMIN — FAMOTIDINE SCH MG: 10 INJECTION, SOLUTION INTRAVENOUS at 07:27

## 2018-06-21 RX ADMIN — FAMOTIDINE SCH MG: 10 INJECTION, SOLUTION INTRAVENOUS at 21:52

## 2018-06-21 RX ADMIN — HYDROMORPHONE HYDROCHLORIDE PRN MG: 1 INJECTION, SOLUTION INTRAMUSCULAR; INTRAVENOUS; SUBCUTANEOUS at 13:32

## 2018-06-21 RX ADMIN — POTASSIUM CHLORIDE SCH MLS/HR: 7.46 INJECTION, SOLUTION INTRAVENOUS at 12:23

## 2018-06-21 RX ADMIN — CEFAZOLIN SCH MLS/HR: 330 INJECTION, POWDER, FOR SOLUTION INTRAMUSCULAR; INTRAVENOUS at 21:52

## 2018-06-21 RX ADMIN — CEFAZOLIN SCH MLS/HR: 330 INJECTION, POWDER, FOR SOLUTION INTRAMUSCULAR; INTRAVENOUS at 04:04

## 2018-06-21 RX ADMIN — DEXTROSE, SOYBEAN OIL, ELECTROLYTES, LYSINE, PHENYLALANINE, LEUCINE, VALINE, THREONINE, METHIONINE, ISOLEUCINE, TRYPTOPHAN, ALANINE, ARGININE, GLYCINE, PROLINE, HISTIDINE, GLUTAMIC ACID, SERINE, ASPARTIC ACID AND TYROSINE SCH MLS/HR
9.8; 3.9; 239; 174; 147; 96; 29; 263; 231; 231; 213; 164; 164; 164; 55; 467; 330; 231; 199; 199; 164; 131; 99; 6.7 INJECTION, EMULSION INTRAVENOUS at 13:22

## 2018-06-21 RX ADMIN — HYDROMORPHONE HYDROCHLORIDE PRN MG: 1 INJECTION, SOLUTION INTRAMUSCULAR; INTRAVENOUS; SUBCUTANEOUS at 08:19

## 2018-06-21 RX ADMIN — ATORVASTATIN CALCIUM SCH MG: 80 TABLET, FILM COATED ORAL at 07:27

## 2018-06-21 RX ADMIN — METRONIDAZOLE SCH MLS/HR: 500 INJECTION, SOLUTION INTRAVENOUS at 17:21

## 2018-06-21 RX ADMIN — ENOXAPARIN SODIUM SCH: 30 INJECTION SUBCUTANEOUS at 21:51

## 2018-06-21 RX ADMIN — ENOXAPARIN SODIUM SCH: 30 INJECTION SUBCUTANEOUS at 07:23

## 2018-06-21 RX ADMIN — LISINOPRIL SCH MG: 5 TABLET ORAL at 07:27

## 2018-06-21 RX ADMIN — CEFAZOLIN SCH MLS/HR: 330 INJECTION, POWDER, FOR SOLUTION INTRAMUSCULAR; INTRAVENOUS at 11:26

## 2018-06-21 RX ADMIN — DEXTROSE MONOHYDRATE AND SODIUM CHLORIDE SCH: 5; .9 INJECTION, SOLUTION INTRAVENOUS at 07:26

## 2018-06-21 RX ADMIN — POTASSIUM CHLORIDE SCH MLS/HR: 7.46 INJECTION, SOLUTION INTRAVENOUS at 11:26

## 2018-06-21 RX ADMIN — LEVOFLOXACIN SCH MLS/HR: 750 INJECTION, SOLUTION INTRAVENOUS at 07:27

## 2018-06-21 NOTE — IR
PICC LINE PLACEMENT:

 

HISTORY:  Infection requiring long-term antibiotic therapy

 

PROCEDURE:  Ultrasound and fluoroscopic guidance of PICC line placement.

 

COMPLICATIONS:  None

 

ANESTHESIA:  1. 1% Lidocaine locally.

 

FINDINGS/TECHNIQUE:  The procedure was explained to the patient.  The risks, complications, benefits 
and alternatives were discussed and any questions were answered.  Informed consent was obtained.  The
 patient was placed supine on the fluoroscopic table and prepped and draped in the usual sterile [facility] access hospital
ion.  Utilizing a  21 gauge needle and sonographic and fluoroscopic guidance, access in the vein was 
achieved and there is placement of a 0.018 guidewire.  The vein is patent.  A 4-F sheath was placed o
mignon the guidewire.  The guidewire and dilator were removed and a 4-F. PICC line was placed through th
e sheath with the tip at the level of the SVC.  The sheath was removed, the catheter was flushed and 
sutured into position.  The patient was stable throughout the procedure and remained stable upon disc
harge from the Department of Radiology. 

 

The vein puncture was patent under ultrasound. A gray scale image was obtained to document patency of
 the vein punctured.

 

All elements of the maximal barrier technique were utilized.

 

FLUOROSCOPY TIME:  0.1 minute, one image submitted

 

IMPRESSION: 

Successful PICC line placement under ultrasound and fluoroscopic guidance.

## 2018-06-21 NOTE — P.PN
<Marlee Simon M - Last Filed: 06/21/18 14:04>





Subjective


Progress Note Date: 06/21/18





58-year-old male seen and examined.  Patient stated his nasal gastric tube "

fell out last night".  Abdominal distention is improving.  Patient reports 

slight improvement in abdominal pain no bowel movement.  Patient is scheduled 

today to have a PICC line 





Objective





- Vital Signs


Vital signs: 


 Vital Signs











Temp  97.5 F L  06/21/18 06:24


 


Pulse  88   06/21/18 06:24


 


Resp  17   06/21/18 06:24


 


BP  158/97   06/21/18 06:24


 


Pulse Ox  95   06/21/18 06:24








 Intake & Output











 06/20/18 06/21/18 06/21/18





 18:59 06:59 18:59


 


Intake Total 2951  


 


Output Total 2175 800 


 


Balance 776 -800 


 


Intake:   


 


  Intake, IV Titration 2951  





  Amount   


 


    Mvi, Adult No.4 with Vit 1551  





    K 10 ml Trace (Conc-1Ml/   





    Dose) 1 ml In Amin 3.3%/   





    Dex 9.8%/Lipid/Lytes 1,   





    540 ml @ 64 mls/hr IV .   





    Q24H [facility] Rx#:[id number]   


 


    Potassium Chloride 10 meq 200  





    In Water For Injection 1   





    100ml.bag @ 100 mls/hr   





    IVPB Q1H [facility] Rx#:   





    [id number]   


 


    Sodium Chloride 0.9% 1, 1000  





    000 ml @ 125 mls/hr IV .   





    Q8H [facility] Rx#:[id number]   


 


    metroNIDAZOLE-NS  200  





    mg In Saline 1 100ml.bag   





    @ 100 mls/hr IVPB Q6HR   





    [facility] Rx#:[id number]   


 


Output:   


 


  Gastric Drainage 550  


 


  Urine 1625 800 


 


Other:   


 


  Voiding Method  Toilet Toilet


 


  # Voids   2


 


  # Bowel Movements 0  














- Exam





Physical exam


58-year-old male resting in bed states slight improvement in the abdominal 

cramping less abdominal distention


Lungs diminished at the bases otherwise adequate air movement states feels 

slightly short of breath sats are documented 95% on room air


Heart S1-S2 audible and regular monitor 


Abdomen firm less distended  Few Hypoactive bowel tones no stool reportedly 

urinating no difficulty reports a nausea sensation no active emesis


Extremities no edema noted to lower extreme





- Labs


CBC & Chem 7: 


 06/20/18 07:47





 06/21/18 07:26


Labs: 


 Abnormal Lab Results - Last 24 Hours (Table)











  06/21/18 06/21/18 06/21/18 Range/Units





  00:24 00:53 03:38 


 


BUN     (9-20)  mg/dL


 


Creatinine     (0.66-1.25)  mg/dL


 


POC Glucose (mg/dL)  71 L  72 L  68 L  (75-99)  mg/dL


 


Calcium     (8.4-10.2)  mg/dL














  06/21/18 06/21/18 06/21/18 Range/Units





  04:18 06:11 06:49 


 


BUN     (9-20)  mg/dL


 


Creatinine     (0.66-1.25)  mg/dL


 


POC Glucose (mg/dL)  109 H  70 L  118 H  (75-99)  mg/dL


 


Calcium     (8.4-10.2)  mg/dL














  06/21/18 Range/Units





  07:26 


 


BUN  3 L  (9-20)  mg/dL


 


Creatinine  0.59 L  (0.66-1.25)  mg/dL


 


POC Glucose (mg/dL)   (75-99)  mg/dL


 


Calcium  8.2 L  (8.4-10.2)  mg/dL








 Microbiology - Last 24 Hours (Table)











 06/14/18 09:10 Blood Culture - Final





 Blood    No Growth after 144 hours














Assessment and Plan


Assessment: 





Impression


Present on admission abdominal pain nausea vomiting suspect due to partial 

bowel obstruction involving small and large bowel


CAT scan abdomen and pelvis obtained on admission report indicates mild acute 

diverticulitis mid to distal sigmoid colon causing partial small bowel 

obstruction involving small large bowel


Known coronary artery disease with prior coronary stenting done October 2017 on 

dual antiplatelet therapy Plavix and aspirin


History of tobacco abuse recently quit


A recent colonoscopy to the sigmoid colon with flexible sigmoidoscopy done 

February 2018 showed scattered diverticulosis of the sigmoid colon with severe 

torturous sigmoid colon with no evidence of an active bleed as part of a workup 

for acute blood loss anemia


A recent EGD February 2018 no evidence of an active duodenitis, no duodenal 

ulcer: Gastric ulcer


Abdominal x-ray done on the 18th findings are suggestive of severe ileus 


CAT scan abdomen and pelvis:colon Collapse with severely dilated small bowel


History of chronic daily alcohol use with no evidence of impending withdrawals





Plan


Aspirin Plavix on hold last dose June 18


Plavix anticipate restarting 24 hours after procedure


Reconsult cardiology 


IV fluid for hydration


Monitor labs


DVT and GI prophylaxis


Pain control


PICC line for TPN for nutritional support 


Patient will need a diagnostic laparoscopic probable lysis of adhesions when 

off Plavix for 5 days 


Further surgical recommendations pending will follow with you











Progress note dictated for Dr. Desai rounding on behalf of Dr. cordova








The above impression and plan of care have been discussed and directed by 

signing physician. Marlee Simon nurse practitioner acting as scribe for signing 

physician.





<Eliz Desai N - Last Filed: 06/21/18 21:24>





Objective





- Vital Signs


Vital signs: 


 Vital Signs











Temp  98.2 F   06/21/18 14:39


 


Pulse  87   06/21/18 14:39


 


Resp  16   06/21/18 14:39


 


BP  149/80   06/21/18 14:39


 


Pulse Ox  97   06/21/18 14:39








 Intake & Output











 06/21/18 06/21/18 06/22/18





 06:59 18:59 06:59


 


Output Total 800  250


 


Balance -800  -250


 


Output:   


 


  Urine 800  250


 


Other:   


 


  Voiding Method Toilet Toilet 


 


  # Voids  2 














- Labs


CBC & Chem 7: 


 06/20/18 07:47





 06/21/18 07:26


Labs: 


 Abnormal Lab Results - Last 24 Hours (Table)











  06/21/18 06/21/18 06/21/18 Range/Units





  00:24 00:53 03:38 


 


BUN     (9-20)  mg/dL


 


Creatinine     (0.66-1.25)  mg/dL


 


POC Glucose (mg/dL)  71 L  72 L  68 L  (75-99)  mg/dL


 


Calcium     (8.4-10.2)  mg/dL














  06/21/18 06/21/18 06/21/18 Range/Units





  04:18 06:11 06:49 


 


BUN     (9-20)  mg/dL


 


Creatinine     (0.66-1.25)  mg/dL


 


POC Glucose (mg/dL)  109 H  70 L  118 H  (75-99)  mg/dL


 


Calcium     (8.4-10.2)  mg/dL














  06/21/18 06/21/18 Range/Units





  07:26 17:20 


 


BUN  3 L   (9-20)  mg/dL


 


Creatinine  0.59 L   (0.66-1.25)  mg/dL


 


POC Glucose (mg/dL)   105 H  (75-99)  mg/dL


 


Calcium  8.2 L   (8.4-10.2)  mg/dL














Assessment and Plan


(1) Partial bowel obstruction


Current Visit: Yes   Status: Acute   Code(s): K56.600 - PARTIAL INTESTINAL 

OBSTRUCTION, UNSPECIFIED AS TO CAUSE   SNOMED Code(s): 60580938


   


Plan: 





Patient seen and evaluated.  NG tube fell out.  He is at risk for recurrence of 

bowel obstruction.  Patient is agreeable to proceed with laparoscopic lysis of 

adhesions.  Potential discharge within 24-48 hours pending intraoperative 

findings.  Surgery to be scheduled for Saturday as he will have completed 5 

days off Plavix.

## 2018-06-22 LAB
ANION GAP SERPL CALC-SCNC: 7 MMOL/L
BUN SERPL-SCNC: 2 MG/DL (ref 9–20)
CALCIUM SPEC-MCNC: 8.4 MG/DL (ref 8.4–10.2)
CHLORIDE SERPL-SCNC: 105 MMOL/L (ref 98–107)
CO2 SERPL-SCNC: 25 MMOL/L (ref 22–30)
GLUCOSE BLD-MCNC: 111 MG/DL (ref 75–99)
GLUCOSE BLD-MCNC: 113 MG/DL (ref 75–99)
GLUCOSE BLD-MCNC: 114 MG/DL (ref 75–99)
GLUCOSE BLD-MCNC: 118 MG/DL (ref 75–99)
GLUCOSE SERPL-MCNC: 120 MG/DL (ref 74–99)
MAGNESIUM SPEC-SCNC: 2 MG/DL (ref 1.6–2.3)
POTASSIUM SERPL-SCNC: 3.7 MMOL/L (ref 3.5–5.1)
SODIUM SERPL-SCNC: 137 MMOL/L (ref 137–145)

## 2018-06-22 RX ADMIN — ENOXAPARIN SODIUM SCH: 30 INJECTION SUBCUTANEOUS at 07:54

## 2018-06-22 RX ADMIN — HYDROMORPHONE HYDROCHLORIDE PRN MG: 1 INJECTION, SOLUTION INTRAMUSCULAR; INTRAVENOUS; SUBCUTANEOUS at 08:44

## 2018-06-22 RX ADMIN — HYDROMORPHONE HYDROCHLORIDE PRN MG: 1 INJECTION, SOLUTION INTRAMUSCULAR; INTRAVENOUS; SUBCUTANEOUS at 04:35

## 2018-06-22 RX ADMIN — METRONIDAZOLE SCH MLS/HR: 500 INJECTION, SOLUTION INTRAVENOUS at 11:19

## 2018-06-22 RX ADMIN — METOPROLOL TARTRATE SCH MG: 25 TABLET, FILM COATED ORAL at 07:55

## 2018-06-22 RX ADMIN — FAMOTIDINE SCH MG: 10 INJECTION, SOLUTION INTRAVENOUS at 21:00

## 2018-06-22 RX ADMIN — DEXTROSE MONOHYDRATE AND SODIUM CHLORIDE SCH: 5; .9 INJECTION, SOLUTION INTRAVENOUS at 07:55

## 2018-06-22 RX ADMIN — METRONIDAZOLE SCH MLS/HR: 500 INJECTION, SOLUTION INTRAVENOUS at 06:32

## 2018-06-22 RX ADMIN — METRONIDAZOLE SCH MLS/HR: 500 INJECTION, SOLUTION INTRAVENOUS at 17:08

## 2018-06-22 RX ADMIN — ATORVASTATIN CALCIUM SCH MG: 80 TABLET, FILM COATED ORAL at 07:55

## 2018-06-22 RX ADMIN — HYDROMORPHONE HYDROCHLORIDE PRN MG: 1 INJECTION, SOLUTION INTRAMUSCULAR; INTRAVENOUS; SUBCUTANEOUS at 21:00

## 2018-06-22 RX ADMIN — CEFAZOLIN SCH MLS/HR: 330 INJECTION, POWDER, FOR SOLUTION INTRAMUSCULAR; INTRAVENOUS at 11:19

## 2018-06-22 RX ADMIN — DEXTROSE, SOYBEAN OIL, ELECTROLYTES, LYSINE, PHENYLALANINE, LEUCINE, VALINE, THREONINE, METHIONINE, ISOLEUCINE, TRYPTOPHAN, ALANINE, ARGININE, GLYCINE, PROLINE, HISTIDINE, GLUTAMIC ACID, SERINE, ASPARTIC ACID AND TYROSINE SCH MLS/HR
9.8; 3.9; 239; 174; 147; 96; 29; 263; 231; 231; 213; 164; 164; 164; 55; 467; 330; 231; 199; 199; 164; 131; 99; 6.7 INJECTION, EMULSION INTRAVENOUS at 14:00

## 2018-06-22 RX ADMIN — HYDROMORPHONE HYDROCHLORIDE PRN MG: 1 INJECTION, SOLUTION INTRAMUSCULAR; INTRAVENOUS; SUBCUTANEOUS at 12:51

## 2018-06-22 RX ADMIN — LISINOPRIL SCH MG: 5 TABLET ORAL at 07:55

## 2018-06-22 RX ADMIN — METOPROLOL TARTRATE SCH MG: 25 TABLET, FILM COATED ORAL at 21:00

## 2018-06-22 RX ADMIN — FAMOTIDINE SCH MG: 10 INJECTION, SOLUTION INTRAVENOUS at 07:55

## 2018-06-22 RX ADMIN — HYDROMORPHONE HYDROCHLORIDE PRN MG: 1 INJECTION, SOLUTION INTRAMUSCULAR; INTRAVENOUS; SUBCUTANEOUS at 17:08

## 2018-06-22 RX ADMIN — HYDROMORPHONE HYDROCHLORIDE PRN MG: 1 INJECTION, SOLUTION INTRAMUSCULAR; INTRAVENOUS; SUBCUTANEOUS at 01:22

## 2018-06-22 RX ADMIN — METRONIDAZOLE SCH MLS/HR: 500 INJECTION, SOLUTION INTRAVENOUS at 01:15

## 2018-06-22 RX ADMIN — CEFAZOLIN SCH MLS/HR: 330 INJECTION, POWDER, FOR SOLUTION INTRAMUSCULAR; INTRAVENOUS at 05:20

## 2018-06-22 RX ADMIN — POTASSIUM CHLORIDE SCH MLS/HR: 7.46 INJECTION, SOLUTION INTRAVENOUS at 14:39

## 2018-06-22 RX ADMIN — LEVOFLOXACIN SCH MLS/HR: 750 INJECTION, SOLUTION INTRAVENOUS at 07:55

## 2018-06-22 RX ADMIN — POTASSIUM CHLORIDE SCH MLS/HR: 7.46 INJECTION, SOLUTION INTRAVENOUS at 13:34

## 2018-06-22 NOTE — P.PN
Subjective


Progress Note Date: 06/22/18


Progress note  being dictated for Dr. Burris














Interval history: This is a 58-year-old gentleman admitted with partial  bowel 

obstruction involving both small and large bowel and multiple other medical 

issues.  NG tube and Londono catheter discontinued yesterday.  Reports multiple 

bowel movements, yet abdomen remains distended.  Denies abdominal tenderness.  

Follow-up Abdominal x-ray reporting continued marked diffuse small bowel 

dilatation, prominent air throughout the right;, suggestive of severe ileus 

rather than small bowel obstruction and no free intraperitoneal air below the 

hemidiaphragms .complains of shortness of breath, suspect related to abdominal 

distention, chest x-ray ordered. Voiding without difficulty.  Complains of 

nausea, on clear liquid diet as per surgery.  Afebrile.  Blood cultures 

negative.











06/19/2018 increased abdominal pain during the night and NG tube reinserted as 

per surgery. Abdomen remains firm and distended, with 450 MLS dark brownish 

drainage over the last 8 hours.positive fluctuating abdominal pain.Diagnostic 

laparoscopic lysis of adhesions recommend per surgery.  Aspirin, Plavix placed 

on hold, maintained on Lovenox.  PPN nitiated.  Maintained on IV fluid 

hydration. Cardiology consulted regarding anticoagulation recommendations/ 

surgery.














06/20/2018 receiving IV fluid hydration. PPN unavailable from pharmacy, TPN 

ordered as per surgery.  Scheduled for PICC line placement. Patient ambulating 

to and from bathroom, complaining of exertional dyspnea.  Chest x-ray reporting 

possible early pneumonia right lung base, mild atelectasis. Minimal improvement 

in abdominal distention.  Reports mild improvement in abdominal pain.  NG  

drainage of around 800ml /8hrs. Telemetry reporting sinus rhythm to sinus tach, 

low 100s.  Plavix and aspirin remain on hold for upcoming surgery, Saturday.  

Afebrile








6/21/18 NG tube "fell out last night".  Minimal improvement in abdominal pain, 

abdominal distention.  Mild nausea, no bowel movement.  No emesis.  Awaiting 

PICC line placement.  Afebrile, maintaining O2 sats of 95-97% on room air.





Review of systems:





CONSTITUTIONAL: No fever, positive fatigue. 


HEENT: No recent visual problems or hearing problems. Denied any sore throat. 


CARDIOVASCULAR: No chest pain,  no palpitations, no syncope. 


PULMONARY: Exertional shortness of breath, no cough, no hemoptysis. 


GASTROINTESTINAL: No diarrhea, positive nausea, no vomiting, positive abdominal 

pain. 


NEUROLOGICAL: No headaches, generalized weakness, no numbness. 


HEMATOLOGICAL: Denies any bleeding or petechiae. 


GENITOURINARY: Denies any burning micturition, frequency, or urgency.  


MUSCULOSKELETAL/RHEUMATOLOGICAL: Denies any joint pain, swelling, or any muscle 

pain. 


ENDOCRINE: Denies any polyuria or polydipsia.


PSYCHIATRIC: No anxiety, no depression





The rest of the 14 point review of systems is negative

















Active Medications





Hydrocodone Bitart/Acetaminophen (Norco 7.5-325)  1 each PO Q6H PRN


   PRN Reason: Pain


   Last Admin: 06/19/18 09:09 Dose:  1 each


Atorvastatin Calcium (Lipitor)  80 mg PO QAM [facility]


   Last Admin: 06/21/18 07:27 Dose:  80 mg


Benzocaine/Menthol (Cepacol Lozenge)  1 each MUCOUS MEM Q4HR PRN


   PRN Reason: Sore Throat


   Last Admin: 06/16/18 10:28 Dose:  1 each


Enoxaparin Sodium (Lovenox)  30 mg SQ Q12HR [facility]


   Last Admin: 06/21/18 21:51 Dose:  Not Given


Famotidine (Pepcid)  20 mg IV Q12HR [facility]


   Last Admin: 06/21/18 21:52 Dose:  20 mg


Hydromorphone HCl (Dilaudid)  1 mg IVP Q4HR PRN


   PRN Reason: Pain


   Last Admin: 06/21/18 21:45 Dose:  1 mg


Levofloxacin 750 mg/ IV (Solution)  150 mls @ 100 mls/hr IVPB Q24H [facility]


   Last Admin: 06/21/18 07:27 Dose:  100 mls/hr


Metronidazole 500 mg/ IV (Solution)  100 mls @ 100 mls/hr IVPB Q6HR [facility]


   Last Admin: 06/21/18 17:21 Dose:  100 mls/hr


Sodium Chloride (Saline 0.9%)  1,000 mls @ 125 mls/hr IV .Q8H [facility]


   Last Admin: 06/21/18 21:52 Dose:  125 mls/hr


Parenteral Vitamin Supplement 10 ml/ Chromium/Copper/Manganese/Seleni/Zn 1 ml/

Total Parenteral Nutrition  1,551 mls @ 64 mls/hr IV .Q24H [facility]


   Last Admin: 06/21/18 13:22 Dose:  64 mls/hr


Dextrose/Sodium Chloride (Dextrose 5%-Ns Iv Soln)  1,000 mls @ 20 mls/hr IV 

.Q24H [facility]


   Last Admin: 06/21/18 07:26 Dose:  Not Given


Lisinopril (Zestril)  5 mg PO DAILY [facility]


   Last Admin: 06/21/18 07:27 Dose:  5 mg


Lorazepam (Ativan)  1 mg IV Q2HR PRN


   PRN Reason: CIWA 8 or 9


   Last Admin: 06/19/18 06:13 Dose:  1 mg


Lorazepam (Ativan)  1 mg IV Q1HR PRN


   PRN Reason: CIWA 10 to 15


Metoclopramide HCl (Reglan)  5 mg IVP Q6HR PRN


   PRN Reason: Nausea And Vomiting


   Last Admin: 06/20/18 19:45 Dose:  5 mg


Metoprolol Tartrate (Lopressor)  25 mg PO BID [facility]


   Last Admin: 06/21/18 21:50 Dose:  25 mg


Miscellaneous Information (Potassium Per Protocol)  1 each MISCELLANE DAILY PRN

; Protocol


   PRN Reason: Per Protocol


Naloxone HCl (Narcan)  0.2 mg IV Q2M PRN


   PRN Reason: Opioid Reversal








06/22/2018 scheduled for surgery tomorrow.  Abdominal distention mildly improved

, no bowel movements.  Reports improvement in abdominal pain/cramping.  

Receiving TPN via PICC line.  Afebrile.  Denies chest pain, palpitations or 

shortness of breath.
































  












































Objective





- Vital Signs


Vital signs: 


 Vital Signs











Temp  97.0 F L  06/22/18 15:00


 


Pulse  82   06/22/18 15:00


 


Resp  16   06/22/18 15:00


 


BP  150/86   06/22/18 15:00


 


Pulse Ox  96   06/22/18 15:00








 Intake & Output











 06/21/18 06/22/18 06/22/18





 18:59 06:59 18:59


 


Intake Total   1551


 


Output Total  2250 


 


Balance  -2250 1551


 


Weight   74.843 kg


 


Intake:   


 


  Intake, IV Titration   1551





  Amount   


 


    Mvi, Adult No.4 with Vit   1551





    K 10 ml Trace (Conc-1Ml/   





    Dose) 1 ml In Amin 3.3%/   





    Dex 9.8%/Lipid/Lytes 1,   





    540 ml @ 64 mls/hr IV .   





    Q24H [facility] Rx#:[id number]   


 


Output:   


 


  Urine  2250 


 


  Emesis  0 


 


Other:   


 


  Voiding Method Toilet  Toilet


 


  # Voids 2 0 2


 


  # Bowel Movements  0 


 


  # Emeses  0 














- Exam


PHYSICAL EXAM:


VITAL SIGNS: [As above]


GENERAL: Sitting up in bed, no acute distress


HEENT:  Conjunctivae normal. eyes normal.  Oral mucosa dry. 


NECK:  No JVD. No thyroid enlargement. No LNs


CARDIOVASCULAR:  S1, S2 muffled. No murmur


RESPIRATION: Breath sounds diminished in the bases. No rhonchi or crackles.  No 

wheezing


ABDOMEN:  less Firm, distended, diffuse tenderness. Hypoactive bowel sounds.  

Mild guarding. no masses palpable.


LEGS:  No edema. no swelling 


PSYCHIATRY: Alert and oriented -3, mood and affect normal.


NERVOUS SYSTEM:  Cranial N 2-12 grossly normal. Moves all 4 limbs.  Diffuse 

weakness No focal deficits. 














- Labs


CBC & Chem 7: 


 06/20/18 07:47





 06/22/18 07:58


Labs: 


 Abnormal Lab Results - Last 24 Hours (Table)











  06/22/18 06/22/18 06/22/18 Range/Units





  00:04 06:15 07:58 


 


BUN    2 L  (9-20)  mg/dL


 


Creatinine    0.60 L  (0.66-1.25)  mg/dL


 


Glucose    120 H  (74-99)  mg/dL


 


POC Glucose (mg/dL)  113 H  118 H   (75-99)  mg/dL














  06/22/18 06/22/18 Range/Units





  11:54 17:13 


 


BUN    (9-20)  mg/dL


 


Creatinine    (0.66-1.25)  mg/dL


 


Glucose    (74-99)  mg/dL


 


POC Glucose (mg/dL)  114 H  111 H  (75-99)  mg/dL














Assessment and Plan


Assessment: 


1.  Acute severe ileus with  Acute partial bowel obstruction, involving the 

small and large bowel with possible mild acute diverticulitis, development 

severly dilated small bowel per computed tomography scan


2.  Recent colonoscopy 2/2018 reporting scattered diverticulosis of sigmoid 

colon with severe tortuous sigmoid colon.


3.  CAD, stenting in October 2017 on dual antiplatelet therapy-currently on hold


4.  Chronic daily alcohol use


5.  Status post PICC line placement 








Plan: Continue current medication regime ,monitoring and symptomatic treatment.

  Maintain IV fluid hydration, TPN, antibiotics. OR scheduled for tomorrow.  

Lovenox on hold as per surgery. Aggressive pulmonary toileting with incentive 

spirometer reinforced.  Pain management as per surgery.  CIWA protocol in 

place. Prognosis guarded given multiple complex medical issues.














The impression and plan of care has been dictated as directed.





:


I performed a history and examination of this patient,  discussed the same with 

the dictator.  I agree with the dictator's note ,documented as a scribe.  Any 

additional findings or plans will be noted.

## 2018-06-22 NOTE — P.PN
Subjective


Progress Note Date: 06/22/18





Patient reports localized left lower quadrant abdominal pain.  This has been 

persistent since his hospitalization over a week ago.  He is on TPN.  No 

reports of fevers or chills.  He reports decreased passage of flatus.





Objective





- Vital Signs


Vital signs: 


 Vital Signs











Temp  97.0 F L  06/22/18 15:00


 


Pulse  82   06/22/18 15:00


 


Resp  16   06/22/18 15:00


 


BP  150/86   06/22/18 15:00


 


Pulse Ox  96   06/22/18 15:00








 Intake & Output











 06/22/18 06/22/18 06/23/18





 06:59 18:59 06:59


 


Intake Total  1551 


 


Output Total 2250  


 


Balance -2250 1551 


 


Weight  74.843 kg 


 


Intake:   


 


  Intake, IV Titration  1551 





  Amount   


 


    Mvi, Adult No.4 with Vit  1551 





    K 10 ml Trace (Conc-1Ml/   





    Dose) 1 ml In Amin 3.3%/   





    Dex 9.8%/Lipid/Lytes 1,   





    540 ml @ 64 mls/hr IV .   





    Q24H [facility] Rx#:[id number]   


 


Output:   


 


  Urine 2250  


 


  Emesis 0  


 


Other:   


 


  Voiding Method  Toilet 


 


  # Voids 0 2 


 


  # Bowel Movements 0  


 


  # Emeses 0  














- Exam





ABDOMEN: Soft.  Tender along left lower quadrant.  No peritonitis.


GENERAL:  Well developed and in no acute distress. Pleasant.


HEENT:  No sclera icterus. Extraocular movements grossly intact.  Moist buccal 

mucosa. Head is atraumatic, normocephalic. Hears conversational speech. No 

nasal drainage.


NECK:  Supple without lymphadenopathy. No JV distention.


CHEST:  Non-labored respirations and equal bilateral excursions. 


CARDIOVASCULAR:  Regular rate and rhythm.  Palpable 2+ radial pulses.


MUSCULOSKELETAL:  No clubbing, cyanosis or edema.


NEUROLOGIC:  No focal or lateralizing signs. 


PSYCH:  Appropriate affect.  Alert and oriented to person, place and time.


SKIN: Good skin turgor.  Well perfused.





- Labs


CBC & Chem 7: 


 06/20/18 07:47





 06/22/18 07:58


Labs: 


 Abnormal Lab Results - Last 24 Hours (Table)











  06/22/18 06/22/18 06/22/18 Range/Units





  00:04 06:15 07:58 


 


BUN    2 L  (9-20)  mg/dL


 


Creatinine    0.60 L  (0.66-1.25)  mg/dL


 


Glucose    120 H  (74-99)  mg/dL


 


POC Glucose (mg/dL)  113 H  118 H   (75-99)  mg/dL














  06/22/18 06/22/18 Range/Units





  11:54 17:13 


 


BUN    (9-20)  mg/dL


 


Creatinine    (0.66-1.25)  mg/dL


 


Glucose    (74-99)  mg/dL


 


POC Glucose (mg/dL)  114 H  111 H  (75-99)  mg/dL














Assessment and Plan


(1) Partial bowel obstruction


Current Visit: Yes   Status: Acute   Code(s): K56.600 - PARTIAL INTESTINAL 

OBSTRUCTION, UNSPECIFIED AS TO CAUSE   SNOMED Code(s): 60668475


   





(2) Intractable left lower quadrant abdominal pain


Current Visit: Yes   Status: Acute   Code(s): R10.32 - LEFT LOWER QUADRANT PAIN

   SNOMED Code(s): 110879176


   


Plan: 





1.  His wife is at bedside.  I had a long discussion regarding surgical 

options.  Given the chronicity of his abdominal pain including protracted course

, patient presents actually more as a bowel obstruction.


2.  Surgical intervention with laparoscopic lysis of adhesions were described.  

Potential small bowel resection was also described.


3.  Laparoscopic approach reviewed as to optimize immediate recovery and return 

to antiplatelet therapy for his cardiac history.


4.  All questions were addressed including surgical planning and postoperative 

care.

## 2018-06-23 LAB
AFP-TM SERPL-MCNC: 2.8 NG/ML (ref 0–7.9)
ANION GAP SERPL CALC-SCNC: 8 MMOL/L
BASOPHILS # BLD AUTO: 0 K/UL (ref 0–0.2)
BASOPHILS NFR BLD AUTO: 0 %
BUN SERPL-SCNC: 3 MG/DL (ref 9–20)
CALCIUM SPEC-MCNC: 8.4 MG/DL (ref 8.4–10.2)
CHLORIDE SERPL-SCNC: 105 MMOL/L (ref 98–107)
CO2 SERPL-SCNC: 25 MMOL/L (ref 22–30)
EOSINOPHIL # BLD AUTO: 0 K/UL (ref 0–0.7)
EOSINOPHIL NFR BLD AUTO: 0 %
ERYTHROCYTE [DISTWIDTH] IN BLOOD BY AUTOMATED COUNT: 4.23 M/UL (ref 4.3–5.9)
ERYTHROCYTE [DISTWIDTH] IN BLOOD: 14 % (ref 11.5–15.5)
GLUCOSE BLD-MCNC: 108 MG/DL (ref 75–99)
GLUCOSE BLD-MCNC: 116 MG/DL (ref 75–99)
GLUCOSE BLD-MCNC: 167 MG/DL (ref 75–99)
GLUCOSE BLD-MCNC: 178 MG/DL (ref 75–99)
GLUCOSE SERPL-MCNC: 174 MG/DL (ref 74–99)
HCT VFR BLD AUTO: 40.1 % (ref 39–53)
HGB BLD-MCNC: 12.6 GM/DL (ref 13–17.5)
LYMPHOCYTES # SPEC AUTO: 0.4 K/UL (ref 1–4.8)
LYMPHOCYTES NFR SPEC AUTO: 5 %
MAGNESIUM SPEC-SCNC: 2.2 MG/DL (ref 1.6–2.3)
MCH RBC QN AUTO: 29.7 PG (ref 25–35)
MCHC RBC AUTO-ENTMCNC: 31.4 G/DL (ref 31–37)
MCV RBC AUTO: 94.7 FL (ref 80–100)
MONOCYTES # BLD AUTO: 0.2 K/UL (ref 0–1)
MONOCYTES NFR BLD AUTO: 2 %
NEUTROPHILS # BLD AUTO: 7.4 K/UL (ref 1.3–7.7)
NEUTROPHILS NFR BLD AUTO: 91 %
PLATELET # BLD AUTO: 484 K/UL (ref 150–450)
POTASSIUM SERPL-SCNC: 3.8 MMOL/L (ref 3.5–5.1)
SODIUM SERPL-SCNC: 138 MMOL/L (ref 137–145)
WBC # BLD AUTO: 8.1 K/UL (ref 3.8–10.6)

## 2018-06-23 PROCEDURE — 0DNN4ZZ RELEASE SIGMOID COLON, PERCUTANEOUS ENDOSCOPIC APPROACH: ICD-10-PCS

## 2018-06-23 PROCEDURE — 0DJD8ZZ INSPECTION OF LOWER INTESTINAL TRACT, VIA NATURAL OR ARTIFICIAL OPENING ENDOSCOPIC: ICD-10-PCS

## 2018-06-23 PROCEDURE — 0DJ08ZZ INSPECTION OF UPPER INTESTINAL TRACT, VIA NATURAL OR ARTIFICIAL OPENING ENDOSCOPIC: ICD-10-PCS

## 2018-06-23 RX ADMIN — CEFAZOLIN SCH: 330 INJECTION, POWDER, FOR SOLUTION INTRAMUSCULAR; INTRAVENOUS at 05:01

## 2018-06-23 RX ADMIN — METRONIDAZOLE SCH MLS/HR: 500 INJECTION, SOLUTION INTRAVENOUS at 13:11

## 2018-06-23 RX ADMIN — HYDROMORPHONE HYDROCHLORIDE PRN MG: 1 INJECTION, SOLUTION INTRAMUSCULAR; INTRAVENOUS; SUBCUTANEOUS at 21:47

## 2018-06-23 RX ADMIN — LEVOFLOXACIN SCH MLS/HR: 750 INJECTION, SOLUTION INTRAVENOUS at 11:31

## 2018-06-23 RX ADMIN — HYDROMORPHONE HYDROCHLORIDE PRN MG: 1 INJECTION, SOLUTION INTRAMUSCULAR; INTRAVENOUS; SUBCUTANEOUS at 17:15

## 2018-06-23 RX ADMIN — DEXTROSE, SOYBEAN OIL, ELECTROLYTES, LYSINE, PHENYLALANINE, LEUCINE, VALINE, THREONINE, METHIONINE, ISOLEUCINE, TRYPTOPHAN, ALANINE, ARGININE, GLYCINE, PROLINE, HISTIDINE, GLUTAMIC ACID, SERINE, ASPARTIC ACID AND TYROSINE SCH MLS/HR
9.8; 3.9; 239; 174; 147; 96; 29; 263; 231; 231; 213; 164; 164; 164; 55; 467; 330; 231; 199; 199; 164; 131; 99; 6.7 INJECTION, EMULSION INTRAVENOUS at 15:14

## 2018-06-23 RX ADMIN — FAMOTIDINE SCH: 10 INJECTION, SOLUTION INTRAVENOUS at 11:20

## 2018-06-23 RX ADMIN — METRONIDAZOLE SCH MLS/HR: 500 INJECTION, SOLUTION INTRAVENOUS at 05:46

## 2018-06-23 RX ADMIN — DEXTROSE MONOHYDRATE AND SODIUM CHLORIDE SCH: 5; .9 INJECTION, SOLUTION INTRAVENOUS at 16:25

## 2018-06-23 RX ADMIN — ATORVASTATIN CALCIUM SCH MG: 80 TABLET, FILM COATED ORAL at 11:32

## 2018-06-23 RX ADMIN — HYDROMORPHONE HYDROCHLORIDE PRN MG: 1 INJECTION, SOLUTION INTRAMUSCULAR; INTRAVENOUS; SUBCUTANEOUS at 12:54

## 2018-06-23 RX ADMIN — METOCLOPRAMIDE PRN MG: 5 INJECTION, SOLUTION INTRAMUSCULAR; INTRAVENOUS at 16:25

## 2018-06-23 RX ADMIN — CEFAZOLIN SCH MLS/HR: 330 INJECTION, POWDER, FOR SOLUTION INTRAMUSCULAR; INTRAVENOUS at 11:32

## 2018-06-23 RX ADMIN — METRONIDAZOLE SCH MLS/HR: 500 INJECTION, SOLUTION INTRAVENOUS at 18:48

## 2018-06-23 RX ADMIN — METOPROLOL TARTRATE SCH MG: 25 TABLET, FILM COATED ORAL at 20:59

## 2018-06-23 RX ADMIN — HYDROMORPHONE HYDROCHLORIDE PRN MG: 1 INJECTION, SOLUTION INTRAMUSCULAR; INTRAVENOUS; SUBCUTANEOUS at 05:45

## 2018-06-23 RX ADMIN — CEFAZOLIN SCH: 330 INJECTION, POWDER, FOR SOLUTION INTRAMUSCULAR; INTRAVENOUS at 20:58

## 2018-06-23 RX ADMIN — CEFAZOLIN SCH: 330 INJECTION, POWDER, FOR SOLUTION INTRAMUSCULAR; INTRAVENOUS at 03:28

## 2018-06-23 RX ADMIN — METRONIDAZOLE SCH MLS/HR: 500 INJECTION, SOLUTION INTRAVENOUS at 00:02

## 2018-06-23 RX ADMIN — HYDROMORPHONE HYDROCHLORIDE PRN MG: 1 INJECTION, SOLUTION INTRAMUSCULAR; INTRAVENOUS; SUBCUTANEOUS at 00:57

## 2018-06-23 RX ADMIN — METOPROLOL TARTRATE SCH: 25 TABLET, FILM COATED ORAL at 11:18

## 2018-06-23 RX ADMIN — LISINOPRIL SCH MG: 5 TABLET ORAL at 11:32

## 2018-06-23 NOTE — P.HPADDEND
H&P Addendum


H&P Addendum Date: 06/23/18





Wife at bedside.  Benefits and risks described.  Surgical intervention with 

laparoscopic lysis of adhesions was described.  Possibility of small bowel 

resection also reviewed.  Patient and family agreeable to proceed.

## 2018-06-23 NOTE — P.PCN
Date of Procedure: 06/23/18


Preoperative Diagnosis: 


Bowel obstruction


Postoperative Diagnosis: 


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 


Procedure(s) Performed: 


Diagnostic laparoscopy, laparoscopic lysis of adhesions, intraoperative EGD, 

intraoperative attempted colonoscopy


Anesthesia: GETA, local


Surgeon: Eliz Desai


Estimated Blood Loss (ml): 5


Pathology: none sent


Condition: stable


Disposition: floor


Operative Findings: 





1.  Failed placement of oral and nasogastric tube prompted placement of EGD for 

stomach and small bowel decompression


2.  Diagnostic laparoscopy performed confirming small bowel distention 

including colonic distention.


3.  Focal area of obstruction with hypervascularity and nodularity and tumor of 

the proximal sigmoid colon acting as a colonic volvulus


4.  Intraoperative colonoscopy performed demonstrating moderate liquid stool 

prohibiting view and biopsy at area of concern


5.  Open laparotomy and colostomy creation  avoided at this time per patient 

request preoperatively.

## 2018-06-23 NOTE — P.PN
Progress Note - Text


Progress Note Date: 06/23/18





Patient's chart reviewed in detail including previous for CT scans performed in 

the first 48 hours of his hospitalization.  Findings confirm a mass along the 

sigmoid colon as confirmed on diagnostic laparoscopy.  Patient also confirmed 

that he has been able to pass flatus just before surgery.  





PLAN:


1. Will do a 2 day colonic prep


2. Continue Londono catheter for strict I's and O's follow-up.  


3. May have light clear liquid diet.  


4. Plan for colon resection on Monday via robotic approach.  


5. Continue to hold Plavix for robotic colectomy possible exploratory 

laparotomy and colostomy creation


6. May have Lovenox in the interim.  





Surgical plan thoroughly described to the patient and family regarding care 

coordination.

## 2018-06-23 NOTE — PN
PROGRESS NOTE



DATE OF SERVICE:

06/23/2018.



INTERVAL HISTORY:

This 58-year-old gentleman who was admitted with bowel obstruction, had 
laparoscopy at

this time which showed possible sigmoid mass with significant colonic surgery is

being planned on Monday.  No chest pain.  No palpitations.  No fever.



PHYSICAL EXAM:

Alert and oriented x3.  Pulse 93, blood pressure 133/83, respirations 16, 
temperature

97.7, pulse ox 94% on 2 L.

HEENT: Conjunctivae normal.  Oral mucosa moist.

Neck is no jugular venous distention.  No carotid bruit. No lymph node 
enlargement.

Cardiovascular System: S1, S2 muffled.

Respirations: Breath sounds diminished in the bases.  No rhonchi  and no 
crackles.

ABDOMEN:  Soft, obese, status post surgery.  Legs no edema.

No swelling. NERVOUS SYSTEM: No focal deficits.



LABS:

WBC 8.2, hemoglobin 12.6, glucose 186.



ASSESSMENT:

1. Possible acute bowel obstruction secondary to sigmoid mass including colonic 
and

    small intestinal obstruction.

2. Possible acute mild diverticulitis.

3. Severe tortuous sigmoid colon.

4. Coronary artery disease stenting.

5. History of EtOH.

6. Status post PICC line placement.

7. TPN.



RECOMMENDATIONS AND DISCUSSION:

Recommend to continue current medications, continue monitoring and symptomatic

treatment.  Otherwise at this time I would recommend to continue the current 
medical

management and treatment and close follow surgery and prognosis guarded.  
Further

recommendations to follow.





JAEL / TODDN: [id number] / Job#: [id number]

NAEL

## 2018-06-24 LAB
ANION GAP SERPL CALC-SCNC: 7 MMOL/L
BASOPHILS # BLD AUTO: 0 K/UL (ref 0–0.2)
BASOPHILS NFR BLD AUTO: 0 %
BUN SERPL-SCNC: 6 MG/DL (ref 9–20)
CALCIUM SPEC-MCNC: 8.3 MG/DL (ref 8.4–10.2)
CHLORIDE SERPL-SCNC: 105 MMOL/L (ref 98–107)
CO2 SERPL-SCNC: 26 MMOL/L (ref 22–30)
EOSINOPHIL # BLD AUTO: 0.1 K/UL (ref 0–0.7)
EOSINOPHIL NFR BLD AUTO: 1 %
ERYTHROCYTE [DISTWIDTH] IN BLOOD BY AUTOMATED COUNT: 3.91 M/UL (ref 4.3–5.9)
ERYTHROCYTE [DISTWIDTH] IN BLOOD: 14.1 % (ref 11.5–15.5)
GLUCOSE BLD-MCNC: 105 MG/DL (ref 75–99)
GLUCOSE BLD-MCNC: 142 MG/DL (ref 75–99)
GLUCOSE BLD-MCNC: 151 MG/DL (ref 75–99)
GLUCOSE BLD-MCNC: 98 MG/DL (ref 75–99)
GLUCOSE SERPL-MCNC: 113 MG/DL (ref 74–99)
HCT VFR BLD AUTO: 36.3 % (ref 39–53)
HGB BLD-MCNC: 11.5 GM/DL (ref 13–17.5)
LYMPHOCYTES # SPEC AUTO: 1.6 K/UL (ref 1–4.8)
LYMPHOCYTES NFR SPEC AUTO: 21 %
MAGNESIUM SPEC-SCNC: 2.4 MG/DL (ref 1.6–2.3)
MCH RBC QN AUTO: 29.5 PG (ref 25–35)
MCHC RBC AUTO-ENTMCNC: 31.9 G/DL (ref 31–37)
MCV RBC AUTO: 92.6 FL (ref 80–100)
MONOCYTES # BLD AUTO: 0.6 K/UL (ref 0–1)
MONOCYTES NFR BLD AUTO: 7 %
NEUTROPHILS # BLD AUTO: 5.2 K/UL (ref 1.3–7.7)
NEUTROPHILS NFR BLD AUTO: 69 %
PLATELET # BLD AUTO: 525 K/UL (ref 150–450)
POTASSIUM SERPL-SCNC: 3.5 MMOL/L (ref 3.5–5.1)
SODIUM SERPL-SCNC: 138 MMOL/L (ref 137–145)
WBC # BLD AUTO: 7.6 K/UL (ref 3.8–10.6)

## 2018-06-24 RX ADMIN — METRONIDAZOLE SCH MLS/HR: 500 INJECTION, SOLUTION INTRAVENOUS at 13:29

## 2018-06-24 RX ADMIN — METOPROLOL TARTRATE SCH MG: 25 TABLET, FILM COATED ORAL at 21:30

## 2018-06-24 RX ADMIN — POTASSIUM CHLORIDE SCH MLS/HR: 7.46 INJECTION, SOLUTION INTRAVENOUS at 12:36

## 2018-06-24 RX ADMIN — PANTOPRAZOLE SODIUM SCH MG: 40 INJECTION, POWDER, FOR SOLUTION INTRAVENOUS at 08:56

## 2018-06-24 RX ADMIN — ATORVASTATIN CALCIUM SCH MG: 80 TABLET, FILM COATED ORAL at 08:56

## 2018-06-24 RX ADMIN — HYDROMORPHONE HYDROCHLORIDE PRN MG: 1 INJECTION, SOLUTION INTRAMUSCULAR; INTRAVENOUS; SUBCUTANEOUS at 06:26

## 2018-06-24 RX ADMIN — ENOXAPARIN SODIUM SCH: 40 INJECTION SUBCUTANEOUS at 08:49

## 2018-06-24 RX ADMIN — METRONIDAZOLE SCH MLS/HR: 500 INJECTION, SOLUTION INTRAVENOUS at 01:00

## 2018-06-24 RX ADMIN — LEVOFLOXACIN SCH MLS/HR: 750 INJECTION, SOLUTION INTRAVENOUS at 08:54

## 2018-06-24 RX ADMIN — CEFAZOLIN SCH MLS/HR: 330 INJECTION, POWDER, FOR SOLUTION INTRAMUSCULAR; INTRAVENOUS at 08:54

## 2018-06-24 RX ADMIN — HYDROMORPHONE HYDROCHLORIDE PRN MG: 1 INJECTION, SOLUTION INTRAMUSCULAR; INTRAVENOUS; SUBCUTANEOUS at 14:53

## 2018-06-24 RX ADMIN — HYDROMORPHONE HYDROCHLORIDE PRN MG: 1 INJECTION, SOLUTION INTRAMUSCULAR; INTRAVENOUS; SUBCUTANEOUS at 10:34

## 2018-06-24 RX ADMIN — POTASSIUM CHLORIDE SCH MLS/HR: 7.46 INJECTION, SOLUTION INTRAVENOUS at 14:54

## 2018-06-24 RX ADMIN — CEFAZOLIN SCH MLS/HR: 330 INJECTION, POWDER, FOR SOLUTION INTRAMUSCULAR; INTRAVENOUS at 17:32

## 2018-06-24 RX ADMIN — METOPROLOL TARTRATE SCH MG: 25 TABLET, FILM COATED ORAL at 08:56

## 2018-06-24 RX ADMIN — SODIUM FERRIC GLUCONATE COMPLEX SCH MLS/HR: 12.5 INJECTION INTRAVENOUS at 11:11

## 2018-06-24 RX ADMIN — CEFAZOLIN SCH: 330 INJECTION, POWDER, FOR SOLUTION INTRAMUSCULAR; INTRAVENOUS at 04:38

## 2018-06-24 RX ADMIN — HYDROMORPHONE HYDROCHLORIDE PRN MG: 1 INJECTION, SOLUTION INTRAMUSCULAR; INTRAVENOUS; SUBCUTANEOUS at 22:39

## 2018-06-24 RX ADMIN — DEXTROSE, SOYBEAN OIL, ELECTROLYTES, LYSINE, PHENYLALANINE, LEUCINE, VALINE, THREONINE, METHIONINE, ISOLEUCINE, TRYPTOPHAN, ALANINE, ARGININE, GLYCINE, PROLINE, HISTIDINE, GLUTAMIC ACID, SERINE, ASPARTIC ACID AND TYROSINE SCH MLS/HR
9.8; 3.9; 239; 174; 147; 96; 29; 263; 231; 231; 213; 164; 164; 164; 55; 467; 330; 231; 199; 199; 164; 131; 99; 6.7 INJECTION, EMULSION INTRAVENOUS at 15:46

## 2018-06-24 RX ADMIN — METRONIDAZOLE SCH MLS/HR: 500 INJECTION, SOLUTION INTRAVENOUS at 17:16

## 2018-06-24 RX ADMIN — LISINOPRIL SCH MG: 5 TABLET ORAL at 08:56

## 2018-06-24 RX ADMIN — METRONIDAZOLE SCH MLS/HR: 500 INJECTION, SOLUTION INTRAVENOUS at 06:26

## 2018-06-24 RX ADMIN — HYDROMORPHONE HYDROCHLORIDE PRN MG: 1 INJECTION, SOLUTION INTRAMUSCULAR; INTRAVENOUS; SUBCUTANEOUS at 02:45

## 2018-06-24 RX ADMIN — METRONIDAZOLE SCH MLS/HR: 500 INJECTION, SOLUTION INTRAVENOUS at 23:14

## 2018-06-24 RX ADMIN — HYDROMORPHONE HYDROCHLORIDE PRN MG: 1 INJECTION, SOLUTION INTRAMUSCULAR; INTRAVENOUS; SUBCUTANEOUS at 18:43

## 2018-06-24 NOTE — P.PN
Subjective


Progress Note Date: 06/24/18





Patient had a trial of bowel prep and had no results.  Denies any nausea and 

vomiting.  He still reports his abdominal distention is the same.  He had 

passed a small amount of flatus.  His wife is at bedside.  No reports of fevers 

or chills.  He is on TPN and nothing by mouth except ice chips.





Objective





- Vital Signs


Vital signs: 


 Vital Signs











Temp  97.8 F   06/24/18 14:42


 


Pulse  81   06/24/18 14:42


 


Resp  16   06/24/18 14:42


 


BP  138/90   06/24/18 14:42


 


Pulse Ox  93 L  06/24/18 14:42








 Intake & Output











 06/23/18 06/24/18 06/24/18





 18:59 06:59 18:59


 


Intake Total 2201  


 


Output Total 3080 825 500


 


Balance -879 -825 -500


 


Weight   74.843 kg


 


Intake:   


 


    


 


  Intake, IV Titration 1551  





  Amount   


 


    Mvi, Adult No.4 with Vit 1551  





    K 10 ml Trace (Conc-1Ml/   





    Dose) 1 ml In Amin 3.3%/   





    Dex 9.8%/Lipid/Lytes 1,   





    540 ml @ 64 mls/hr IV .   





    Q24H [facility] Rx#:[id number]   


 


Output:   


 


  Urine 3075 825 500


 


  Estimated Blood Loss 5  


 


Other:   


 


  Voiding Method Indwelling Catheter Indwelling Catheter Indwelling Catheter


 


  # Voids  1 


 


  # Bowel Movements 0 0 0














- Exam





ABDOMEN: Soft.  Mild to moderate abdominal distention.  No peritonitis.


GENERAL:  Well developed and in no acute distress. Pleasant.


HEENT:  No sclera icterus. Extraocular movements grossly intact.  Moist buccal 

mucosa. Head is atraumatic, normocephalic. Hears conversational speech. No 

nasal drainage.


NECK:  Supple without lymphadenopathy. No JV distention.


CHEST:  Non-labored respirations and equal bilateral excursions. 


CARDIOVASCULAR:  Regular rate and rhythm.  Palpable 2+ radial pulses.


MUSCULOSKELETAL:  No clubbing, cyanosis or edema.


NEUROLOGIC:  No focal or lateralizing signs. 


: Londono catheter present with saw color urine.


PSYCH:  Appropriate affect.  Alert and oriented to person, place and time.


SKIN: Good skin turgor.  Well perfused.





- Labs


CBC & Chem 7: 


 06/24/18 09:32





 06/24/18 09:32


Labs: 


 Abnormal Lab Results - Last 24 Hours (Table)











  06/23/18 06/23/18 06/24/18 Range/Units





  12:02 17:57 00:22 


 


RBC     (4.30-5.90)  m/uL


 


Hgb     (13.0-17.5)  gm/dL


 


Hct     (39.0-53.0)  %


 


Plt Count     (150-450)  k/uL


 


BUN     (9-20)  mg/dL


 


Creatinine     (0.66-1.25)  mg/dL


 


Glucose     (74-99)  mg/dL


 


POC Glucose (mg/dL)   167 H  151 H  (75-99)  mg/dL


 


Calcium     (8.4-10.2)  mg/dL


 


Magnesium     (1.6-2.3)  mg/dL


 


Iron  20 L    ()  ug/dL


 


TIBC  209 L    (228-460)  ug/dL


 


Iron Saturation  9.57 L    (15.00-50.00)   














  06/24/18 06/24/18 06/24/18 Range/Units





  06:15 09:32 09:32 


 


RBC    3.91 L  (4.30-5.90)  m/uL


 


Hgb    11.5 L  (13.0-17.5)  gm/dL


 


Hct    36.3 L  (39.0-53.0)  %


 


Plt Count    525 H  (150-450)  k/uL


 


BUN   6 L   (9-20)  mg/dL


 


Creatinine   0.60 L   (0.66-1.25)  mg/dL


 


Glucose   113 H   (74-99)  mg/dL


 


POC Glucose (mg/dL)  142 H    (75-99)  mg/dL


 


Calcium   8.3 L   (8.4-10.2)  mg/dL


 


Magnesium   2.4 H   (1.6-2.3)  mg/dL


 


Iron     ()  ug/dL


 


TIBC     (228-460)  ug/dL


 


Iron Saturation     (15.00-50.00)   














Assessment and Plan


(1) Partial bowel obstruction


Current Visit: Yes   Status: Acute   Code(s): K56.600 - PARTIAL INTESTINAL 

OBSTRUCTION, UNSPECIFIED AS TO CAUSE   SNOMED Code(s): 66955549


   





(2) Intractable left lower quadrant abdominal pain


Current Visit: Yes   Status: Acute   Code(s): R10.32 - LEFT LOWER QUADRANT PAIN

   SNOMED Code(s): 787535204


   





(3) Neoplasm of sigmoid colon


Current Visit: Yes   Status: Acute   Code(s): D49.0 - NEOPLASM OF UNSPECIFIED 

BEHAVIOR OF DIGESTIVE SYSTEM   SNOMED Code(s): 161129273


   





(4) Sigmoid volvulus


Current Visit: Yes   Status: Acute   Code(s): K56.2 - VOLVULUS   SNOMED Code(s)

: 850636634


   





(5) Large bowel obstruction


Current Visit: Yes   Status: Acute   Code(s): K56.609 - UNSP INTESTNL OBST, 

UNSP AS TO PARTIAL VERSUS COMPLETE OBST   SNOMED Code(s): 121138573


   





(6) Sigmoid diverticulosis


Current Visit: Yes   Status: Acute   Code(s): K57.30 - DVRTCLOS OF LG INT W/O 

PERFORATION OR ABSCESS W/O BLEEDING   SNOMED Code(s): 282354613


   





(7) Ischemic cardiomyopathy


Current Visit: Yes   Status: Acute   Code(s): I25.5 - ISCHEMIC CARDIOMYOPATHY   

SNOMED Code(s): 725195384


   





(8) History of cardiac catheterization


Current Visit: Yes   Status: Acute   Code(s): Z98.890 - OTHER SPECIFIED 

POSTPROCEDURAL STATES   SNOMED Code(s): 92831542628064


   


Plan: 





1.  Despite trial of bowel prep, he has had no results.


2.  Alternatively, colectomy with colostomy creation was described in detail.  

He is at high risk for anastomotic leak secondary to protein malnutrition, 

antiplatelet use with increased risks of bleed, and unable to do a bowel prep.


3.  I did review this will be a temporary colostomy as an anastomosis is 

extremely high risk at this point.


4.  Will need colostomy teaching.


5.  Additionally, patient will need to be back on antiplatelet therapy shortly 

after his colectomy.


6.  Best chances of recovery include colostomy creation with deferred reversal 

after correction of inadequate protein intake and an appropriate bowel prep.


7.  Continue with iron infusions to address iron deficiency anemia


8.  Minimally invasive approach allows for least amount of blood loss and 

highest short-term and long-term recovery.


9.  All questions were addressed.  Patient and wife verbalizes understanding of 

surgical decision making.  They're comfortable to proceed with colostomy 

creation.

## 2018-06-24 NOTE — PN
PROGRESS NOTE



This 58-year-old male patient admitted with a bowel obstruction. Laparoscopy showed a 6

point mass and he is awaiting surgery on Monday.  He denies any chest discomfort. No

shortness of breath or orthopnea.



PHYSICAL EXAMINATION:

On examination, he is afebrile at 97 degrees Fahrenheit, pulse rate is 80s,

respirations are normal, blood pressure 139/87 mmHg.

Heart sounds are S1, S2 soft.  No murmurs or gallops.  Breath sounds are reduced

bilaterally. Abdomen is distended.  Extremities:  No edema.



IMPRESSION:

1. Abdominal mass, awaiting surgery.

2. Coronary artery disease status post stenting in the past.

3. History of alcohol use.



SUGGEST:

Continue current medications and proceed with surgery.  The patient appears stable from

a cardiovascular standpoint.





MMODL / IJN: [id number] / Job#: [id number]

## 2018-06-24 NOTE — PN
PROGRESS NOTE



DATE OF SERVICE:

06/24/2018



This 58-year-old gentleman admitted after bowel obstruction had possible sigmoid mass.

The patient is scheduled to have surgery tomorrow by Dr. Desai.  No chest pain.  No

palpitations.  No fever.  Abdominal distention is noted.



PHYSICAL EXAM:

Alert and oriented x3.  Pulse 81, blood pressure 130/91, respirations 16, temperature

97.2, pulse ox 93% room air.

HEENT:  Conjunctivae normal.  Oral mucosa moist. Neck is no jugular venous distention.

No carotid bruit. No lymph node enlargement.

CARDIOVASCULAR: S1, S2 muffled.

RESPIRATORY: Breath sounds diminished in the bases. A few scattered rhonchi.

ABDOMEN:  Soft.  Mild diffuse distention.

NERVOUS SYSTEM: No focal deficits.



LABS:

WBC 7.2, hemoglobin 11.5, sodium 130, potassium 3.4.



ASSESSMENT:

1. Acute bowel obstruction secondary to sigmoid mass possibly including colonic and

    small intestinal obstruction.

2. Possible acute mild diverticulitis.

3. Severe tortuous sigmoid colon.

4. Coronary artery disease, stenting.

5. History of ETOH.

6. Status post PICC line placement.

7. TPN.



RECOMMENDATIONS AND DISCUSSION:

I recommend to continue current management and symptomatic treatment at this time.

Closely follow with surgery and DVT prophylaxis. Cardiac input appreciated.  Prognosis

guarded.  Further recommendations to follow.





MMODL / IJN: [id number] / Job#: [id number]

## 2018-06-24 NOTE — P.OP
Date of Procedure: 06/23/18


Description of Procedure: 








SURGEON:  NUNU JENKINS MD


ASSISTANT:  None. 


PREOPERATIVE DIAGNOSES:  


1.  Small bowel obstruction


2.  Inadequate protein intake


3.  Ischemic cardiomyopathy


4.  Hypertensive heart disease


5.  History of antiplatelet therapy


6.  Coronary artery disease


7.  History of gastrointestinal hemorrhage


8.  History of angina


9.  Previous history of myocardial infarction


10.  Abdominal distention





POSTOPERATIVE DIAGNOSES:


1.  Small bowel obstruction


2.  Inadequate protein intake


3.  Ischemic cardiomyopathy


4.  Hypertensive heart disease


5.  History of antiplatelet therapy


6.  Coronary artery disease


7.  History of gastrointestinal hemorrhage


8.  History of angina


9.  Previous history of myocardial infarction


10.  Abdominal distention


11. 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 


12.  Sigmoid volvulus





PROCEDURES PERFORMED:


1.  Diagnostic laparoscopy


2.  Laparoscopic lysis of adhesions


3.  Intraoperative esophagogastroduodenoscopy


4.  Intraoperative attempted colonoscopy with flexible sigmoidoscopy





Anesthesia: GETA, local


Estimated Blood Loss (ml): 5


Pathology: none sent


Condition: stable


Disposition: floor


COMPLICATIONS:  None. 





OPERATIVE FINDINGS: 


1.  Failed placement of oral and nasogastric tube prompted placement of EGD for 

stomach and small bowel decompression


2.  Diagnostic laparoscopy performed confirming small bowel distention 

including colonic distention.


3.  Focal area of obstruction with hypervascularity and nodularity and tumor of 

the proximal sigmoid colon acting as a colonic volvulus


4.  Intraoperative colonoscopy performed demonstrating moderate liquid stool 

prohibiting view and biopsy at area of concern


5.  Open laparotomy and colostomy creation avoided at this time per patient 

request





INDICATIONS: The patient is a 58-year-old male initially admitted over 1 week 

ago with acute abdominal distention and ileus.  Initial conservative measures 

with small bowel decompression had transiently improved however immediately 

rebounded.  Further review of imaging was highly suspicious for bowel 

obstruction hence diagnostic laparoscopy was offered.  Per patient request, no 

colectomy or colostomy was to be performed.  With his wishes in mind, 

diagnostic laparoscopy with lysis of adhesions was described. Benefits and 

risks were described at length. Informed consent was obtained. 





DESCRIPTION OF PROCEDURE:  The patient was brought to the operating room and 

laid in supine position. After general induction, the abdomen was prepped and 

draped in standard sterile fashion. Prior to incision, a timeout protocol was 

confirmed with surgical team regarding patient's name including procedure to be 

performed. Preoperative medications including antibiotics were given.  

Additionally, bilateral SCDs including heparin 5000 units was administered. 





A 5 mm laparoscopic trocar entry was performed of the left upper quadrant after 

anesthetizing the skin with local anesthetic.  Diagnostic laparoscopy 

demonstrated moderate small bowel distention.  An oral gastric and nasogastric 

tube decompression was attempted by anesthesia including myself.  Minimal 

output was obtained.  To successfully perform his laparoscopic procedure, an 

upper endoscopy was proposed to extend the scope to the proximal small bowel 

and decompress the small bowel.





An Olympus gastroscope was passed along the posterior oropharynx down to the 

distal esophagus.  The stomach was entered.  No evidence of gastric or duodenal 

ulcers were found.  The gastroscope was advanced to the proximal jejunum.  The 

proximal small bowel including stomach was decompressed.  The laparoscopic 

portion of the procedure was continued.  I re-scrubbed into the case.





Another two 5-mm trocar was placed along the left lateral abdominal wall and 

another port was placed above the umbilicus under direct localization.  The bed 

was placed and Trendelenburg position with the right side up.





Using atraumatic bowel graspers, the small bowel was investigated from the 

distal ileum to the proximal cecum.  Diffuse distention was confirmed of the 

ascending colon, transverse colon including the descending colon.  With this 

finding, large bowel obstruction was suspected.  Attention was brought to the 

sigmoid colon.  Steep Trendelenburg position was performed to see beyond the 

small bowel distention.  The distal sigmoid colon was distended and a fixated 

hypervascular nodularity was confirmed along the proximal sigmoid colon 

adherent to the right pelvis.  The colon was palpated with graspers and found 

to be firm along the proximal sigmoid tumor.  With this finding, intraluminal 

visualization was proposed using the colonoscope.





I went to the foot of the bed.  The patient was placed in frog-legged position.

  An Olympus colonoscope was advanced into the rectum where moderate stool was 

identified.  With laparoscopic visualization, fixation point and sigmoid 

volvulus was confirmed from the proximal sigmoid tumor.  The distal segment of 

the colon was desufflated.  The endoscopic portion of the procedure was 

terminated.





I re-scrubbed into the case.  Lysis of adhesions was performed along the 

sigmoid volvulus of the tumor was fixed to the pelvis. Final inspection of the 

abdomen demonstrated adequate hemostasis including no enterotomies.





All instruments and pneumoperitoneum were evacuated from the abdominal cavity. 

Local anesthetic was infiltrated in all wounds for postop analgesia. Liquid 

glue was applied to the skin after re-approximating the incisions with 4-0 

Monocryl as described. 





At the end of the procedure, needle, sponge, and instrument count was verified 

correct by surgical technician. The patient had tolerated the procedure well, 

was taken to the postanesthesia care unit in stable condition. 





Intraoperative abdominal films were described and discussed with the patient's 

family.

## 2018-06-25 LAB
ANION GAP SERPL CALC-SCNC: 9 MMOL/L
BASOPHILS # BLD AUTO: 0.1 K/UL (ref 0–0.2)
BASOPHILS NFR BLD AUTO: 1 %
BUN SERPL-SCNC: 5 MG/DL (ref 9–20)
CALCIUM SPEC-MCNC: 8.6 MG/DL (ref 8.4–10.2)
CHLORIDE SERPL-SCNC: 109 MMOL/L (ref 98–107)
CO2 SERPL-SCNC: 20 MMOL/L (ref 22–30)
EOSINOPHIL # BLD AUTO: 0.1 K/UL (ref 0–0.7)
EOSINOPHIL NFR BLD AUTO: 2 %
ERYTHROCYTE [DISTWIDTH] IN BLOOD BY AUTOMATED COUNT: 4.22 M/UL (ref 4.3–5.9)
ERYTHROCYTE [DISTWIDTH] IN BLOOD: 14 % (ref 11.5–15.5)
GLUCOSE BLD-MCNC: 102 MG/DL (ref 75–99)
GLUCOSE BLD-MCNC: 107 MG/DL (ref 75–99)
GLUCOSE BLD-MCNC: 109 MG/DL (ref 75–99)
GLUCOSE SERPL-MCNC: 121 MG/DL (ref 74–99)
HCT VFR BLD AUTO: 39.7 % (ref 39–53)
HGB BLD-MCNC: 12.8 GM/DL (ref 13–17.5)
LYMPHOCYTES # SPEC AUTO: 1.5 K/UL (ref 1–4.8)
LYMPHOCYTES NFR SPEC AUTO: 18 %
MAGNESIUM SPEC-SCNC: 2.2 MG/DL (ref 1.6–2.3)
MCH RBC QN AUTO: 30.3 PG (ref 25–35)
MCHC RBC AUTO-ENTMCNC: 32.3 G/DL (ref 31–37)
MCV RBC AUTO: 94 FL (ref 80–100)
MONOCYTES # BLD AUTO: 0.6 K/UL (ref 0–1)
MONOCYTES NFR BLD AUTO: 7 %
NEUTROPHILS # BLD AUTO: 5.8 K/UL (ref 1.3–7.7)
NEUTROPHILS NFR BLD AUTO: 70 %
PLATELET # BLD AUTO: 581 K/UL (ref 150–450)
POTASSIUM SERPL-SCNC: 3.9 MMOL/L (ref 3.5–5.1)
SODIUM SERPL-SCNC: 138 MMOL/L (ref 137–145)
WBC # BLD AUTO: 8.3 K/UL (ref 3.8–10.6)

## 2018-06-25 PROCEDURE — 0D1M0Z4 BYPASS DESCENDING COLON TO CUTANEOUS, OPEN APPROACH: ICD-10-PCS

## 2018-06-25 PROCEDURE — 0D9A0ZZ DRAINAGE OF JEJUNUM, OPEN APPROACH: ICD-10-PCS

## 2018-06-25 PROCEDURE — 0DTN0ZZ RESECTION OF SIGMOID COLON, OPEN APPROACH: ICD-10-PCS

## 2018-06-25 RX ADMIN — SODIUM FERRIC GLUCONATE COMPLEX SCH MLS/HR: 12.5 INJECTION INTRAVENOUS at 08:30

## 2018-06-25 RX ADMIN — HYDROMORPHONE HYDROCHLORIDE PRN MG: 1 INJECTION, SOLUTION INTRAMUSCULAR; INTRAVENOUS; SUBCUTANEOUS at 02:34

## 2018-06-25 RX ADMIN — METOPROLOL TARTRATE SCH MG: 25 TABLET, FILM COATED ORAL at 08:32

## 2018-06-25 RX ADMIN — ENOXAPARIN SODIUM SCH: 40 INJECTION SUBCUTANEOUS at 08:32

## 2018-06-25 RX ADMIN — HYDROMORPHONE HYDROCHLORIDE PRN MG: 1 INJECTION, SOLUTION INTRAMUSCULAR; INTRAVENOUS; SUBCUTANEOUS at 06:43

## 2018-06-25 RX ADMIN — HYDROMORPHONE HYDROCHLORIDE PRN MG: 1 INJECTION, SOLUTION INTRAMUSCULAR; INTRAVENOUS; SUBCUTANEOUS at 16:28

## 2018-06-25 RX ADMIN — POTASSIUM CHLORIDE SCH MLS/HR: 7.46 INJECTION, SOLUTION INTRAVENOUS at 13:19

## 2018-06-25 RX ADMIN — PANTOPRAZOLE SODIUM SCH MG: 40 INJECTION, POWDER, FOR SOLUTION INTRAVENOUS at 08:31

## 2018-06-25 RX ADMIN — METOCLOPRAMIDE PRN MG: 5 INJECTION, SOLUTION INTRAMUSCULAR; INTRAVENOUS at 04:53

## 2018-06-25 RX ADMIN — METRONIDAZOLE SCH MLS/HR: 500 INJECTION, SOLUTION INTRAVENOUS at 12:18

## 2018-06-25 RX ADMIN — CEFAZOLIN SCH MLS/HR: 330 INJECTION, POWDER, FOR SOLUTION INTRAMUSCULAR; INTRAVENOUS at 09:39

## 2018-06-25 RX ADMIN — HYDROMORPHONE HYDROCHLORIDE PRN MG: 1 INJECTION, SOLUTION INTRAMUSCULAR; INTRAVENOUS; SUBCUTANEOUS at 10:52

## 2018-06-25 RX ADMIN — CEFAZOLIN SCH MLS/HR: 330 INJECTION, POWDER, FOR SOLUTION INTRAMUSCULAR; INTRAVENOUS at 05:43

## 2018-06-25 RX ADMIN — ATORVASTATIN CALCIUM SCH MG: 80 TABLET, FILM COATED ORAL at 08:32

## 2018-06-25 RX ADMIN — METRONIDAZOLE SCH: 500 INJECTION, SOLUTION INTRAVENOUS at 18:39

## 2018-06-25 RX ADMIN — POTASSIUM CHLORIDE SCH MLS/HR: 7.46 INJECTION, SOLUTION INTRAVENOUS at 12:14

## 2018-06-25 RX ADMIN — METRONIDAZOLE SCH MLS/HR: 500 INJECTION, SOLUTION INTRAVENOUS at 05:41

## 2018-06-25 RX ADMIN — DEXTROSE, SOYBEAN OIL, ELECTROLYTES, LYSINE, PHENYLALANINE, LEUCINE, VALINE, THREONINE, METHIONINE, ISOLEUCINE, TRYPTOPHAN, ALANINE, ARGININE, GLYCINE, PROLINE, HISTIDINE, GLUTAMIC ACID, SERINE, ASPARTIC ACID AND TYROSINE SCH MLS/HR
9.8; 3.9; 239; 174; 147; 96; 29; 263; 231; 231; 213; 164; 164; 164; 55; 467; 330; 231; 199; 199; 164; 131; 99; 6.7 INJECTION, EMULSION INTRAVENOUS at 16:29

## 2018-06-25 RX ADMIN — LISINOPRIL SCH MG: 5 TABLET ORAL at 08:32

## 2018-06-25 RX ADMIN — METOCLOPRAMIDE PRN MG: 5 INJECTION, SOLUTION INTRAMUSCULAR; INTRAVENOUS at 16:28

## 2018-06-25 RX ADMIN — SODIUM CHLORIDE PRN MLS: 9 INJECTION, SOLUTION INTRAVENOUS at 22:58

## 2018-06-25 RX ADMIN — LEVOFLOXACIN SCH MLS/HR: 750 INJECTION, SOLUTION INTRAVENOUS at 09:39

## 2018-06-25 NOTE — P.PCN
Date of Procedure: 06/25/18


Preoperative Diagnosis: 


Large bowel obstruction due to sigmoid tumor, inadequate protein intake, 

ischemic cardiomyopathy, history of coronary artery stent placement, history of 

myocardial infarction


Postoperative Diagnosis: 


Same, proximal sigmoid tumor 6 x 4 cm, sigmoid volvulus secondary to tumor, 

sigmoid diverticulosis, diffuse abdominal distention


Procedure(s) Performed: 





1.  Diagnostic laparoscopy converted to open


2.  Small bowel decompression via enterotomy along the  distal jejunum


3.  Sigmoid colectomy involving large proximal sigmoid tumor 


4.  Descending colostomy creation with devitalized rectum, Galvez's procedure


5.  Peritoneal lavage 1 L


6.  Application of wound VAC, Prevena 25-cm


Anesthesia: GETA, local, epidural


Surgeon: Eliz Desai


Assistant #1: Ana M Mcgarry


Estimated Blood Loss (ml): 100


Pathology: other (Sigmoid tumor, suture proximal resection)


Condition: stable


Disposition: floor


Operative Findings: 





1.  Initial diagnostic laparoscopy demonstrating severe small bowel distention 

prohibiting view of pathology.


2.  Initial laparoscopic trocar entry along the left upper quadrant followed by 

a above the umbilicus


3.  Preoperatively, he Ariadne pressures over 40 reduced immediately down to 18 

after abdominal decompression


4.  Small bowel dilation over 5-1/2 cm requiring small bowel decompression via 

pursestring enterotomy of the distal jejunum, over 2 L of enteric contents and 

air relieved from abdomen


5.  Proximal sigmoid tumor creating pinpoint for sigmoid volvulus with adhesion 

to the right lateral pelvis excised using LigaSure


6.  No active sigmoid diverticulitis identified with diverticulosis of the 

sigmoid colon


7.  Moderately redundant sigmoid colon allowing sigmoid resection and 

descending colostomy creation


8.  Rectal stump tagged using 0 Prolene


9.  Case team contaminated and will require prolonged antibiotics


10.  Wound VAC placed along the mid abdomen to minimize surgical site infection

## 2018-06-25 NOTE — P.HPADDEND
H&P Addendum


H&P Addendum Date: 06/25/18





Benefits and risks of surgery described.  Patient reports having 4 bowel 

movements today.  He complains of thirst.  Plan for colectomy with possible 

colostomy described in detail.  Wife at bedside.  All questions and answers 

were addressed.

## 2018-06-25 NOTE — P.PN
Subjective


Progress Note Date: 06/25/18


Progress note  being dictated for Dr. Burris














Interval history: This is a 58-year-old gentleman admitted with partial  bowel 

obstruction involving both small and large bowel and multiple other medical 

issues.  NG tube and Londono catheter discontinued yesterday.  Reports multiple 

bowel movements, yet abdomen remains distended.  Denies abdominal tenderness.  

Follow-up Abdominal x-ray reporting continued marked diffuse small bowel 

dilatation, prominent air throughout the right;, suggestive of severe ileus 

rather than small bowel obstruction and no free intraperitoneal air below the 

hemidiaphragms .complains of shortness of breath, suspect related to abdominal 

distention, chest x-ray ordered. Voiding without difficulty.  Complains of 

nausea, on clear liquid diet as per surgery.  Afebrile.  Blood cultures 

negative.











06/19/2018 increased abdominal pain during the night and NG tube reinserted as 

per surgery. Abdomen remains firm and distended, with 450 MLS dark brownish 

drainage over the last 8 hours.positive fluctuating abdominal pain.Diagnostic 

laparoscopic lysis of adhesions recommend per surgery.  Aspirin, Plavix placed 

on hold, maintained on Lovenox.  PPN nitiated.  Maintained on IV fluid 

hydration. Cardiology consulted regarding anticoagulation recommendations/ 

surgery.














06/20/2018 receiving IV fluid hydration. PPN unavailable from pharmacy, TPN 

ordered as per surgery.  Scheduled for PICC line placement. Patient ambulating 

to and from bathroom, complaining of exertional dyspnea.  Chest x-ray reporting 

possible early pneumonia right lung base, mild atelectasis. Minimal improvement 

in abdominal distention.  Reports mild improvement in abdominal pain.  NG  

drainage of around 800ml /8hrs. Telemetry reporting sinus rhythm to sinus tach, 

low 100s.  Plavix and aspirin remain on hold for upcoming surgery, Saturday.  

Afebrile








6/21/18 NG tube "fell out last night".  Minimal improvement in abdominal pain, 

abdominal distention.  Mild nausea, no bowel movement.  No emesis.  Awaiting 

PICC line placement.  Afebrile, maintaining O2 sats of 95-97% on room air.





Review of systems:





CONSTITUTIONAL: No fever, positive fatigue. 


HEENT: No recent visual problems or hearing problems. Denied any sore throat. 


CARDIOVASCULAR: No chest pain,  no palpitations, no syncope. 


PULMONARY: Exertional shortness of breath, no cough, no hemoptysis. 


GASTROINTESTINAL: No diarrhea, positive nausea, no vomiting, positive abdominal 

pain. 


NEUROLOGICAL: No headaches, generalized weakness, no numbness. 


HEMATOLOGICAL: Denies any bleeding or petechiae. 


GENITOURINARY: Denies any burning micturition, frequency, or urgency.  


MUSCULOSKELETAL/RHEUMATOLOGICAL: Denies any joint pain, swelling, or any muscle 

pain. 


ENDOCRINE: Denies any polyuria or polydipsia.


PSYCHIATRIC: No anxiety, no depression





The rest of the 14 point review of systems is negative

















Active Medications





Hydrocodone Bitart/Acetaminophen (Norco 7.5-325)  1 each PO Q6H PRN


   PRN Reason: Pain


   Last Admin: 06/19/18 09:09 Dose:  1 each


Atorvastatin Calcium (Lipitor)  80 mg PO QAM [facility]


   Last Admin: 06/21/18 07:27 Dose:  80 mg


Benzocaine/Menthol (Cepacol Lozenge)  1 each MUCOUS MEM Q4HR PRN


   PRN Reason: Sore Throat


   Last Admin: 06/16/18 10:28 Dose:  1 each


Enoxaparin Sodium (Lovenox)  30 mg SQ Q12HR [facility]


   Last Admin: 06/21/18 21:51 Dose:  Not Given


Famotidine (Pepcid)  20 mg IV Q12HR [facility]


   Last Admin: 06/21/18 21:52 Dose:  20 mg


Hydromorphone HCl (Dilaudid)  1 mg IVP Q4HR PRN


   PRN Reason: Pain


   Last Admin: 06/21/18 21:45 Dose:  1 mg


Levofloxacin 750 mg/ IV (Solution)  150 mls @ 100 mls/hr IVPB Q24H [facility]


   Last Admin: 06/21/18 07:27 Dose:  100 mls/hr


Metronidazole 500 mg/ IV (Solution)  100 mls @ 100 mls/hr IVPB Q6HR [facility]


   Last Admin: 06/21/18 17:21 Dose:  100 mls/hr


Sodium Chloride (Saline 0.9%)  1,000 mls @ 125 mls/hr IV .Q8H [facility]


   Last Admin: 06/21/18 21:52 Dose:  125 mls/hr


Parenteral Vitamin Supplement 10 ml/ Chromium/Copper/Manganese/Seleni/Zn 1 ml/

Total Parenteral Nutrition  1,551 mls @ 64 mls/hr IV .Q24H [facility]


   Last Admin: 06/21/18 13:22 Dose:  64 mls/hr


Dextrose/Sodium Chloride (Dextrose 5%-Ns Iv Soln)  1,000 mls @ 20 mls/hr IV 

.Q24H [facility]


   Last Admin: 06/21/18 07:26 Dose:  Not Given


Lisinopril (Zestril)  5 mg PO DAILY [facility]


   Last Admin: 06/21/18 07:27 Dose:  5 mg


Lorazepam (Ativan)  1 mg IV Q2HR PRN


   PRN Reason: CIWA 8 or 9


   Last Admin: 06/19/18 06:13 Dose:  1 mg


Lorazepam (Ativan)  1 mg IV Q1HR PRN


   PRN Reason: CIWA 10 to 15


Metoclopramide HCl (Reglan)  5 mg IVP Q6HR PRN


   PRN Reason: Nausea And Vomiting


   Last Admin: 06/20/18 19:45 Dose:  5 mg


Metoprolol Tartrate (Lopressor)  25 mg PO BID [facility]


   Last Admin: 06/21/18 21:50 Dose:  25 mg


Miscellaneous Information (Potassium Per Protocol)  1 each MISCELLANE DAILY PRN

; Protocol


   PRN Reason: Per Protocol


Naloxone HCl (Narcan)  0.2 mg IV Q2M PRN


   PRN Reason: Opioid Reversal








06/22/2018 scheduled for surgery tomorrow.  Abdominal distention mildly improved

, no bowel movements.  Reports improvement in abdominal pain/cramping.  

Receiving TPN via PICC line.  Afebrile.  Denies chest pain, palpitations or 

shortness of breath.














06/25/2018 reports a couple bowel movements both last night and this morning.  

Currently abdominal pain controlled.  Denies chest pain, palpitations.  

Afebrile.
































  












































Objective





- Vital Signs


Vital signs: 


 Vital Signs











Temp  98.6 F   06/25/18 15:13


 


Pulse  90   06/25/18 15:13


 


Resp  18   06/25/18 14:43


 


BP  157/96   06/25/18 15:13


 


Pulse Ox  95   06/25/18 15:13








 Intake & Output











 06/24/18 06/25/18 06/25/18





 18:59 06:59 18:59


 


Intake Total 1551  450


 


Output Total 500 1525 1300


 


Balance 9899 -6459 -913


 


Weight 74.843 kg  74.843 kg


 


Intake:   


 


  Intake, IV Titration 1551  450





  Amount   


 


    Levofloxacin 750Mg-D5w   150





    Pmx 750 mg In Dextrose/   





    Water 1 150ml.bag @ 100   





    mls/hr IVPB Q24H [facility] Rx#:   





    [id number]   


 


    Mvi, Adult No.4 with Vit 1551  





    K 10 ml Trace (Conc-1Ml/   





    Dose) 1 ml In Amin 3.3%/   





    Dex 9.8%/Lipid/Lytes 1,   





    540 ml @ 64 mls/hr IV .   





    Q24H [facility] Rx#:[id number]   


 


    Potassium Chloride 10 meq   100





    In Water For Injection 1   





    100ml.bag @ 100 mls/hr   





    IVPB Q1H [facility] Rx#:   





    [id number]   


 


    Sodium Ferric Gluconat-   100





    Sucrose 125 mg In Sodium   





    Chloride 0.9% 100 ml @   





    100 mls/hr IVPB DAILY [facility]   





    Rx#:[id number]   


 


    metroNIDAZOLE-NS    100





    mg In Saline 1 100ml.bag   





    @ 100 mls/hr IVPB Q6HR   





    [facility] Rx#:[id number]   


 


Output:   


 


  Urine 500 1525 1300


 


Other:   


 


  Voiding Method Indwelling Catheter Indwelling Catheter Indwelling Catheter


 


  # Voids  1 


 


  # Bowel Movements 1  1














- Exam


PHYSICAL EXAM:


VITAL SIGNS: [As above]


GENERAL: Sitting up in bed, no acute distress


HEENT:  Conjunctivae normal. eyes normal.  Oral mucosa dry. 


NECK:  No JVD. No thyroid enlargement. No LNs


CARDIOVASCULAR:  S1, S2 muffled. No murmur


RESPIRATION: Breath sounds diminished in the bases. No rhonchi or crackles.  No 

wheezing


ABDOMEN:  less Firm, distended, diffuse tenderness. Hypoactive bowel sounds.  

Mild guarding. no masses palpable.


LEGS:  No edema. no swelling 


PSYCHIATRY: Alert and oriented -3, mood and affect normal.


NERVOUS SYSTEM:  Cranial N 2-12 grossly normal. Moves all 4 limbs.  Diffuse 

weakness No focal deficits. 














- Labs


CBC & Chem 7: 


 06/25/18 10:09





 06/25/18 10:09


Labs: 


 Abnormal Lab Results - Last 24 Hours (Table)











  06/24/18 06/25/18 06/25/18 Range/Units





  17:24 00:09 06:31 


 


RBC     (4.30-5.90)  m/uL


 


Hgb     (13.0-17.5)  gm/dL


 


Plt Count     (150-450)  k/uL


 


Chloride     ()  mmol/L


 


Carbon Dioxide     (22-30)  mmol/L


 


BUN     (9-20)  mg/dL


 


Creatinine     (0.66-1.25)  mg/dL


 


Glucose     (74-99)  mg/dL


 


POC Glucose (mg/dL)  105 H  107 H  109 H  (75-99)  mg/dL














  06/25/18 06/25/18 06/25/18 Range/Units





  10:09 10:09 12:20 


 


RBC   4.22 L   (4.30-5.90)  m/uL


 


Hgb   12.8 L   (13.0-17.5)  gm/dL


 


Plt Count   581 H   (150-450)  k/uL


 


Chloride  109 H    ()  mmol/L


 


Carbon Dioxide  20 L    (22-30)  mmol/L


 


BUN  5 L    (9-20)  mg/dL


 


Creatinine  0.50 L    (0.66-1.25)  mg/dL


 


Glucose  121 H    (74-99)  mg/dL


 


POC Glucose (mg/dL)    102 H  (75-99)  mg/dL














Assessment and Plan


Assessment: 


1.  Acute bowel instructions secondary to sigmoid mass possibly including 

colonic and small intestinal obstruction.


2.  Possible acute mild diverticulitis


3.  Recent colonoscopy 2/2018 reporting scattered diverticulosis of sigmoid 

colon with severe tortuous sigmoid colon.


4.  CAD, stenting in October 2017 on dual antiplatelet therapy-currently on hold


5.  Chronic daily ETOH use


5.  Status post PICC line placement 








Plan: Continue current medication regime ,monitoring and symptomatic treatment.

  Maintain IV fluid hydration, TPN, antibiotics. OR pending.  Aggressive 

pulmonary toileting with incentive spirometer reinforced.  Pain management as 

per surgery .Prognosis guarded given multiple complex medical issues.














The impression and plan of care has been dictated as directed.





:


I performed a history and examination of this patient,  discussed the same with 

the dictator.  I agree with the dictator's note ,documented as a scribe.  Any 

additional findings or plans will be noted.

## 2018-06-26 LAB
ANION GAP SERPL CALC-SCNC: 7 MMOL/L
BASOPHILS # BLD AUTO: 0 K/UL (ref 0–0.2)
BASOPHILS NFR BLD AUTO: 0 %
BUN SERPL-SCNC: 6 MG/DL (ref 9–20)
CALCIUM SPEC-MCNC: 7.8 MG/DL (ref 8.4–10.2)
CHLORIDE SERPL-SCNC: 103 MMOL/L (ref 98–107)
CO2 SERPL-SCNC: 25 MMOL/L (ref 22–30)
EOSINOPHIL # BLD AUTO: 0.1 K/UL (ref 0–0.7)
EOSINOPHIL NFR BLD AUTO: 1 %
ERYTHROCYTE [DISTWIDTH] IN BLOOD BY AUTOMATED COUNT: 4.13 M/UL (ref 4.3–5.9)
ERYTHROCYTE [DISTWIDTH] IN BLOOD: 14.7 % (ref 11.5–15.5)
GLUCOSE BLD-MCNC: 111 MG/DL (ref 75–99)
GLUCOSE BLD-MCNC: 117 MG/DL (ref 75–99)
GLUCOSE BLD-MCNC: 120 MG/DL (ref 75–99)
GLUCOSE BLD-MCNC: 130 MG/DL (ref 75–99)
GLUCOSE SERPL-MCNC: 118 MG/DL (ref 74–99)
HCT VFR BLD AUTO: 38.5 % (ref 39–53)
HGB BLD-MCNC: 12.5 GM/DL (ref 13–17.5)
LYMPHOCYTES # SPEC AUTO: 0.9 K/UL (ref 1–4.8)
LYMPHOCYTES NFR SPEC AUTO: 9 %
MAGNESIUM SPEC-SCNC: 2 MG/DL (ref 1.6–2.3)
MCH RBC QN AUTO: 30.1 PG (ref 25–35)
MCHC RBC AUTO-ENTMCNC: 32.4 G/DL (ref 31–37)
MCV RBC AUTO: 93 FL (ref 80–100)
MONOCYTES # BLD AUTO: 0.5 K/UL (ref 0–1)
MONOCYTES NFR BLD AUTO: 6 %
NEUTROPHILS # BLD AUTO: 7.9 K/UL (ref 1.3–7.7)
NEUTROPHILS NFR BLD AUTO: 83 %
PLATELET # BLD AUTO: 474 K/UL (ref 150–450)
POTASSIUM SERPL-SCNC: 3.9 MMOL/L (ref 3.5–5.1)
SODIUM SERPL-SCNC: 135 MMOL/L (ref 137–145)
WBC # BLD AUTO: 9.6 K/UL (ref 3.8–10.6)

## 2018-06-26 RX ADMIN — SODIUM FERRIC GLUCONATE COMPLEX SCH MLS/HR: 12.5 INJECTION INTRAVENOUS at 09:49

## 2018-06-26 RX ADMIN — METRONIDAZOLE SCH MLS/HR: 500 INJECTION, SOLUTION INTRAVENOUS at 11:16

## 2018-06-26 RX ADMIN — HYDROMORPHONE HYDROCHLORIDE PRN MG: 1 INJECTION, SOLUTION INTRAMUSCULAR; INTRAVENOUS; SUBCUTANEOUS at 18:12

## 2018-06-26 RX ADMIN — HEPARIN SODIUM SCH UNIT: 5000 INJECTION, SOLUTION INTRAVENOUS; SUBCUTANEOUS at 21:03

## 2018-06-26 RX ADMIN — POTASSIUM CHLORIDE SCH MLS/HR: 7.46 INJECTION, SOLUTION INTRAVENOUS at 12:45

## 2018-06-26 RX ADMIN — METRONIDAZOLE SCH MLS/HR: 500 INJECTION, SOLUTION INTRAVENOUS at 06:28

## 2018-06-26 RX ADMIN — METRONIDAZOLE SCH MLS/HR: 500 INJECTION, SOLUTION INTRAVENOUS at 17:15

## 2018-06-26 RX ADMIN — LEVOFLOXACIN SCH MLS/HR: 750 INJECTION, SOLUTION INTRAVENOUS at 08:15

## 2018-06-26 RX ADMIN — CEFAZOLIN SCH: 330 INJECTION, POWDER, FOR SOLUTION INTRAMUSCULAR; INTRAVENOUS at 05:18

## 2018-06-26 RX ADMIN — METOPROLOL TARTRATE SCH MG: 25 TABLET, FILM COATED ORAL at 08:15

## 2018-06-26 RX ADMIN — METOPROLOL TARTRATE SCH MG: 25 TABLET, FILM COATED ORAL at 00:05

## 2018-06-26 RX ADMIN — ONDANSETRON PRN MG: 2 INJECTION INTRAMUSCULAR; INTRAVENOUS at 11:16

## 2018-06-26 RX ADMIN — LISINOPRIL SCH MG: 5 TABLET ORAL at 08:16

## 2018-06-26 RX ADMIN — METRONIDAZOLE SCH MLS/HR: 500 INJECTION, SOLUTION INTRAVENOUS at 00:04

## 2018-06-26 RX ADMIN — METOPROLOL TARTRATE SCH MG: 25 TABLET, FILM COATED ORAL at 21:02

## 2018-06-26 RX ADMIN — CEFAZOLIN SCH MLS/HR: 330 INJECTION, POWDER, FOR SOLUTION INTRAMUSCULAR; INTRAVENOUS at 11:17

## 2018-06-26 RX ADMIN — ALVIMOPAN SCH MG: 12 CAPSULE ORAL at 21:03

## 2018-06-26 RX ADMIN — POTASSIUM CHLORIDE SCH MLS/HR: 7.46 INJECTION, SOLUTION INTRAVENOUS at 11:17

## 2018-06-26 RX ADMIN — METRONIDAZOLE SCH MLS/HR: 500 INJECTION, SOLUTION INTRAVENOUS at 23:12

## 2018-06-26 RX ADMIN — CEFAZOLIN SCH: 330 INJECTION, POWDER, FOR SOLUTION INTRAMUSCULAR; INTRAVENOUS at 02:31

## 2018-06-26 RX ADMIN — HYDROMORPHONE HYDROCHLORIDE PRN MG: 1 INJECTION, SOLUTION INTRAMUSCULAR; INTRAVENOUS; SUBCUTANEOUS at 23:12

## 2018-06-26 RX ADMIN — ATORVASTATIN CALCIUM SCH MG: 80 TABLET, FILM COATED ORAL at 08:15

## 2018-06-26 RX ADMIN — CEFAZOLIN SCH MLS/HR: 330 INJECTION, POWDER, FOR SOLUTION INTRAMUSCULAR; INTRAVENOUS at 20:27

## 2018-06-26 RX ADMIN — HEPARIN SODIUM SCH UNIT: 5000 INJECTION, SOLUTION INTRAVENOUS; SUBCUTANEOUS at 08:15

## 2018-06-26 RX ADMIN — PANTOPRAZOLE SODIUM SCH MG: 40 INJECTION, POWDER, FOR SOLUTION INTRAVENOUS at 08:15

## 2018-06-26 RX ADMIN — HYDROMORPHONE HYDROCHLORIDE PRN MG: 1 INJECTION, SOLUTION INTRAMUSCULAR; INTRAVENOUS; SUBCUTANEOUS at 14:42

## 2018-06-26 RX ADMIN — SODIUM CHLORIDE PRN MLS/HR: 9 INJECTION, SOLUTION INTRAVENOUS at 20:27

## 2018-06-26 RX ADMIN — ALVIMOPAN SCH MG: 12 CAPSULE ORAL at 08:15

## 2018-06-26 NOTE — P.PN
Subjective


Progress Note Date: 06/26/18


Progress note  being dictated for Dr. Burris














Interval history: This is a 58-year-old gentleman admitted with partial  bowel 

obstruction involving both small and large bowel and multiple other medical 

issues.  NG tube and Londono catheter discontinued yesterday.  Reports multiple 

bowel movements, yet abdomen remains distended.  Denies abdominal tenderness.  

Follow-up Abdominal x-ray reporting continued marked diffuse small bowel 

dilatation, prominent air throughout the right;, suggestive of severe ileus 

rather than small bowel obstruction and no free intraperitoneal air below the 

hemidiaphragms .complains of shortness of breath, suspect related to abdominal 

distention, chest x-ray ordered. Voiding without difficulty.  Complains of 

nausea, on clear liquid diet as per surgery.  Afebrile.  Blood cultures 

negative.











06/19/2018 increased abdominal pain during the night and NG tube reinserted as 

per surgery. Abdomen remains firm and distended, with 450 MLS dark brownish 

drainage over the last 8 hours.positive fluctuating abdominal pain.Diagnostic 

laparoscopic lysis of adhesions recommend per surgery.  Aspirin, Plavix placed 

on hold, maintained on Lovenox.  PPN nitiated.  Maintained on IV fluid 

hydration. Cardiology consulted regarding anticoagulation recommendations/ 

surgery.














06/20/2018 receiving IV fluid hydration. PPN unavailable from pharmacy, TPN 

ordered as per surgery.  Scheduled for PICC line placement. Patient ambulating 

to and from bathroom, complaining of exertional dyspnea.  Chest x-ray reporting 

possible early pneumonia right lung base, mild atelectasis. Minimal improvement 

in abdominal distention.  Reports mild improvement in abdominal pain.  NG  

drainage of around 800ml /8hrs. Telemetry reporting sinus rhythm to sinus tach, 

low 100s.  Plavix and aspirin remain on hold for upcoming surgery, Saturday.  

Afebrile








6/21/18 NG tube "fell out last night".  Minimal improvement in abdominal pain, 

abdominal distention.  Mild nausea, no bowel movement.  No emesis.  Awaiting 

PICC line placement.  Afebrile, maintaining O2 sats of 95-97% on room air.





Review of systems:





CONSTITUTIONAL: No fever, positive fatigue. 


HEENT: No recent visual problems or hearing problems. Denied any sore throat. 


CARDIOVASCULAR: No chest pain,  no palpitations, no syncope. 


PULMONARY: Exertional shortness of breath, no cough, no hemoptysis. 


GASTROINTESTINAL: No diarrhea, positive nausea, no vomiting, positive abdominal 

pain. 


NEUROLOGICAL: No headaches, generalized weakness, no numbness. 


HEMATOLOGICAL: Denies any bleeding or petechiae. 


GENITOURINARY: Denies any burning micturition, frequency, or urgency.  


MUSCULOSKELETAL/RHEUMATOLOGICAL: Denies any joint pain, swelling, or any muscle 

pain. 


ENDOCRINE: Denies any polyuria or polydipsia.


PSYCHIATRIC: No anxiety, no depression





The rest of the 14 point review of systems is negative

















Active Medications





Hydrocodone Bitart/Acetaminophen (Norco 7.5-325)  1 each PO Q6H PRN


   PRN Reason: Pain


   Last Admin: 06/19/18 09:09 Dose:  1 each


Atorvastatin Calcium (Lipitor)  80 mg PO QAM [facility]


   Last Admin: 06/21/18 07:27 Dose:  80 mg


Benzocaine/Menthol (Cepacol Lozenge)  1 each MUCOUS MEM Q4HR PRN


   PRN Reason: Sore Throat


   Last Admin: 06/16/18 10:28 Dose:  1 each


Enoxaparin Sodium (Lovenox)  30 mg SQ Q12HR [facility]


   Last Admin: 06/21/18 21:51 Dose:  Not Given


Famotidine (Pepcid)  20 mg IV Q12HR [facility]


   Last Admin: 06/21/18 21:52 Dose:  20 mg


Hydromorphone HCl (Dilaudid)  1 mg IVP Q4HR PRN


   PRN Reason: Pain


   Last Admin: 06/21/18 21:45 Dose:  1 mg


Levofloxacin 750 mg/ IV (Solution)  150 mls @ 100 mls/hr IVPB Q24H [facility]


   Last Admin: 06/21/18 07:27 Dose:  100 mls/hr


Metronidazole 500 mg/ IV (Solution)  100 mls @ 100 mls/hr IVPB Q6HR [facility]


   Last Admin: 06/21/18 17:21 Dose:  100 mls/hr


Sodium Chloride (Saline 0.9%)  1,000 mls @ 125 mls/hr IV .Q8H [facility]


   Last Admin: 06/21/18 21:52 Dose:  125 mls/hr


Parenteral Vitamin Supplement 10 ml/ Chromium/Copper/Manganese/Seleni/Zn 1 ml/

Total Parenteral Nutrition  1,551 mls @ 64 mls/hr IV .Q24H [facility]


   Last Admin: 06/21/18 13:22 Dose:  64 mls/hr


Dextrose/Sodium Chloride (Dextrose 5%-Ns Iv Soln)  1,000 mls @ 20 mls/hr IV 

.Q24H [facility]


   Last Admin: 06/21/18 07:26 Dose:  Not Given


Lisinopril (Zestril)  5 mg PO DAILY [facility]


   Last Admin: 06/21/18 07:27 Dose:  5 mg


Lorazepam (Ativan)  1 mg IV Q2HR PRN


   PRN Reason: CIWA 8 or 9


   Last Admin: 06/19/18 06:13 Dose:  1 mg


Lorazepam (Ativan)  1 mg IV Q1HR PRN


   PRN Reason: CIWA 10 to 15


Metoclopramide HCl (Reglan)  5 mg IVP Q6HR PRN


   PRN Reason: Nausea And Vomiting


   Last Admin: 06/20/18 19:45 Dose:  5 mg


Metoprolol Tartrate (Lopressor)  25 mg PO BID [facility]


   Last Admin: 06/21/18 21:50 Dose:  25 mg


Miscellaneous Information (Potassium Per Protocol)  1 each MISCELLANE DAILY PRN

; Protocol


   PRN Reason: Per Protocol


Naloxone HCl (Narcan)  0.2 mg IV Q2M PRN


   PRN Reason: Opioid Reversal








06/22/2018 scheduled for surgery tomorrow.  Abdominal distention mildly improved

, no bowel movements.  Reports improvement in abdominal pain/cramping.  

Receiving TPN via PICC line.  Afebrile.  Denies chest pain, palpitations or 

shortness of breath.














06/25/2018 reports a couple bowel movements both last night and this morning.  

Currently abdominal pain controlled.  Denies chest pain, palpitations.  

Afebrile.








06/26/2018 status post sigmoid colectomy involving large proximal sigmoid tumor

, descending colostomy, Galvez's procedure, postop day #1.  Maintained on TPN, 

clear liquids.  No nausea or vomiting.  Patient reports much less pain, 

currently maintained on epidural.  Wound VAC present.
































  












































Objective





- Vital Signs


Vital signs: 


 Vital Signs











Temp  97.4 F L  06/26/18 15:24


 


Pulse  79   06/26/18 15:24


 


Resp  18   06/26/18 15:24


 


BP  94/66   06/26/18 15:24


 


Pulse Ox  93 L  06/26/18 15:24








 Intake & Output











 06/25/18 06/26/18 06/26/18





 18:59 06:59 18:59


 


Intake Total 2101 1800 


 


Output Total 1825 1200 170


 


Balance 276 600 -170


 


Weight 74.843 kg  


 


Intake:   


 


  IV  1800 


 


  Intake, IV Titration 2101  





  Amount   


 


    Levofloxacin 750Mg-D5w 150  





    Pmx 750 mg In Dextrose/   





    Water 1 150ml.bag @ 100   





    mls/hr IVPB Q24H [facility] Rx#:   





    [id number]   


 


    Mvi, Adult No.4 with Vit 1551  





    K 10 ml Trace (Conc-1Ml/   





    Dose) 1 ml In Amin 3.3%/   





    Dex 9.8%/Lipid/Lytes 1,   





    540 ml @ 64 mls/hr IV .   





    Q24H [facility] Rx#:[id number]   


 


    Potassium Chloride 10 meq 200  





    In Water For Injection 1   





    100ml.bag @ 100 mls/hr   





    IVPB Q1H [facility] Rx#:   





    [id number]   


 


    Sodium Ferric Gluconat- 100  





    Sucrose 125 mg In Sodium   





    Chloride 0.9% 100 ml @   





    100 mls/hr IVPB DAILY [facility]   





    Rx#:[id number]   


 


    metroNIDAZOLE-NS  100  





    mg In Saline 1 100ml.bag   





    @ 100 mls/hr IVPB Q6HR   





    [facility] Rx#:[id number]   


 


Output:   


 


  Urine 1825 1100 170


 


    Uretheral (Londono)   20


 


  Estimated Blood Loss  100 


 


Other:   


 


  Voiding Method Indwelling Catheter Indwelling Catheter Indwelling Catheter


 


  # Voids 1  


 


  # Bowel Movements 3  0














- Exam


PHYSICAL EXAM:


VITAL SIGNS: [As above]


GENERAL: Sitting up in bed, no acute distress


HEENT:  Conjunctivae normal. eyes normal.  Oral mucosa dry. 


NECK:  No JVD. No thyroid enlargement. No LNs


CARDIOVASCULAR:  S1, S2 muffled. No murmur


RESPIRATION: Breath sounds diminished in the bases. No rhonchi or crackles.  No 

wheezing


ABDOMEN:  less Firm, distended, status post surgery, colostomy with no drainage

, no bowel sounds.  Wound VAC present.


LEGS:  No edema. no swelling 


PSYCHIATRY: Alert and oriented -3, mood and affect normal.


NERVOUS SYSTEM:  Cranial N 2-12 grossly normal. Moves all 4 limbs.  Diffuse 

weakness No focal deficits. 














- Labs


CBC & Chem 7: 


 06/26/18 08:09





 06/26/18 08:09


Labs: 


 Abnormal Lab Results - Last 24 Hours (Table)











  06/26/18 06/26/18 06/26/18 Range/Units





  00:08 06:01 08:09 


 


RBC     (4.30-5.90)  m/uL


 


Hgb     (13.0-17.5)  gm/dL


 


Hct     (39.0-53.0)  %


 


Plt Count     (150-450)  k/uL


 


Neutrophils #     (1.3-7.7)  k/uL


 


Lymphocytes #     (1.0-4.8)  k/uL


 


Sodium    135 L  (137-145)  mmol/L


 


BUN    6 L  (9-20)  mg/dL


 


Creatinine    0.65 L  (0.66-1.25)  mg/dL


 


Glucose    118 H  (74-99)  mg/dL


 


POC Glucose (mg/dL)  111 H  120 H   (75-99)  mg/dL


 


Calcium    7.8 L  (8.4-10.2)  mg/dL














  06/26/18 06/26/18 Range/Units





  08:09 12:01 


 


RBC  4.13 L   (4.30-5.90)  m/uL


 


Hgb  12.5 L   (13.0-17.5)  gm/dL


 


Hct  38.5 L   (39.0-53.0)  %


 


Plt Count  474 H   (150-450)  k/uL


 


Neutrophils #  7.9 H   (1.3-7.7)  k/uL


 


Lymphocytes #  0.9 L   (1.0-4.8)  k/uL


 


Sodium    (137-145)  mmol/L


 


BUN    (9-20)  mg/dL


 


Creatinine    (0.66-1.25)  mg/dL


 


Glucose    (74-99)  mg/dL


 


POC Glucose (mg/dL)   130 H  (75-99)  mg/dL


 


Calcium    (8.4-10.2)  mg/dL














Assessment and Plan


Assessment: 


1.  Acute bowel obstruction secondary to sigmoid mass possibly including 

colonic and small intestinal obstruction.status post sigmoid colectomy 

involving large proximal sigmoid tumor, descending colostomy, Galvez's 

procedure,.


2.  Possible acute mild diverticulitis


3.  Recent colonoscopy 2/2018 reporting scattered diverticulosis of sigmoid 

colon with severe tortuous sigmoid colon.


4.  CAD, stenting in October 2017 on dual antiplatelet therapy-currently on hold


5.  Chronic daily ETOH use


5.  Status post PICC line placement 








Plan: Continue current medication regime ,monitoring and symptomatic treatment.

  Maintain IV fluid hydration, TPN, antibiotics.  Pathology pending.  

Aggressive pulmonary toileting with incentive spirometer reinforced.  Pain 

management as per surgery .Prognosis guarded given multiple complex medical 

issues.














The impression and plan of care has been dictated as directed.





:


I performed a history and examination of this patient,  discussed the same with 

the dictator.  I agree with the dictator's note ,documented as a scribe.  Any 

additional findings or plans will be noted.

## 2018-06-26 NOTE — P.PN
Progress Note - Text





6/26 730 am


58-year-old male status post exploratory lap by Dr. Desai.  Patient has an 

epidural for postop pain control with the solution running at 10 mL an hour.  

Patient has a VAS of 0 no motor or sensory deficits noted.  Plan to continue 

epidural infusion

## 2018-06-26 NOTE — P.PN
Subjective


Progress Note Date: 06/26/18





He is feeling much better. He tolerated liquids. He drank more than enough 

liquids and felt full. Ostomy with liquid stool. No fevers or chills. Intra-

operative findings completely reviewed. Plan for antiplatelet pending stable 

hemoglobin for 48 hrs from surgery. 





Objective





- Vital Signs


Vital signs: 


 Vital Signs











Temp  97.4 F L  06/26/18 15:24


 


Pulse  79   06/26/18 15:24


 


Resp  18   06/26/18 15:24


 


BP  94/66   06/26/18 15:24


 


Pulse Ox  93 L  06/26/18 15:24








 Intake & Output











 06/25/18 06/26/18 06/26/18





 18:59 06:59 18:59


 


Intake Total 2101 1800 


 


Output Total 1825 1200 170


 


Balance 276 600 -170


 


Weight 74.843 kg  


 


Intake:   


 


  IV  1800 


 


  Intake, IV Titration 2101  





  Amount   


 


    Levofloxacin 750Mg-D5w 150  





    Pmx 750 mg In Dextrose/   





    Water 1 150ml.bag @ 100   





    mls/hr IVPB Q24H [facility] Rx#:   





    [id number]   


 


    Mvi, Adult No.4 with Vit 1551  





    K 10 ml Trace (Conc-1Ml/   





    Dose) 1 ml In Amin 3.3%/   





    Dex 9.8%/Lipid/Lytes 1,   





    540 ml @ 64 mls/hr IV .   





    Q24H [facility] Rx#:[id number]   


 


    Potassium Chloride 10 meq 200  





    In Water For Injection 1   





    100ml.bag @ 100 mls/hr   





    IVPB Q1H [facility] Rx#:   





    [id number]   


 


    Sodium Ferric Gluconat- 100  





    Sucrose 125 mg In Sodium   





    Chloride 0.9% 100 ml @   





    100 mls/hr IVPB DAILY [facility]   





    Rx#:[id number]   


 


    metroNIDAZOLE-NS  100  





    mg In Saline 1 100ml.bag   





    @ 100 mls/hr IVPB Q6HR   





    [facility] Rx#:[id number]   


 


Output:   


 


  Urine 1825 1100 170


 


    Uretheral (Londono)   20


 


  Estimated Blood Loss  100 


 


Other:   


 


  Voiding Method Indwelling Catheter Indwelling Catheter Indwelling Catheter


 


  # Voids 1  


 


  # Bowel Movements 3  0














- Exam





ABDOMEN: Soft.  No peritonitis. Distended. Ostomy with green liquid stool. No 

gas. Incision with PREVENA clean, dry and intact.


GENERAL:  Well developed and in no acute distress. Pleasant.


HEENT:  No sclera icterus. Extraocular movements grossly intact.  Moist buccal 

mucosa. Head is atraumatic, normocephalic. Hears conversational speech. No 

nasal drainage.


NECK:  Supple without lymphadenopathy. No JV distention.


CHEST:  Non-labored respirations and equal bilateral excursions. 


CARDIOVASCULAR:  Regular rate and rhythm.  Palpable 2+ radial pulses.


MUSCULOSKELETAL:  No clubbing, cyanosis or edema.


NEUROLOGIC:  No focal or lateralizing signs. 


: Londono catheter recently removed.


PSYCH:  Appropriate affect.  Alert and oriented to person, place and time.


SKIN: Good skin turgor.  Well perfused.





- Labs


CBC & Chem 7: 


 06/26/18 08:09





 06/26/18 08:09


Labs: 


 Abnormal Lab Results - Last 24 Hours (Table)











  06/26/18 06/26/18 06/26/18 Range/Units





  00:08 06:01 08:09 


 


RBC     (4.30-5.90)  m/uL


 


Hgb     (13.0-17.5)  gm/dL


 


Hct     (39.0-53.0)  %


 


Plt Count     (150-450)  k/uL


 


Neutrophils #     (1.3-7.7)  k/uL


 


Lymphocytes #     (1.0-4.8)  k/uL


 


Sodium    135 L  (137-145)  mmol/L


 


BUN    6 L  (9-20)  mg/dL


 


Creatinine    0.65 L  (0.66-1.25)  mg/dL


 


Glucose    118 H  (74-99)  mg/dL


 


POC Glucose (mg/dL)  111 H  120 H   (75-99)  mg/dL


 


Calcium    7.8 L  (8.4-10.2)  mg/dL














  06/26/18 06/26/18 06/26/18 Range/Units





  08:09 12:01 18:16 


 


RBC  4.13 L    (4.30-5.90)  m/uL


 


Hgb  12.5 L    (13.0-17.5)  gm/dL


 


Hct  38.5 L    (39.0-53.0)  %


 


Plt Count  474 H    (150-450)  k/uL


 


Neutrophils #  7.9 H    (1.3-7.7)  k/uL


 


Lymphocytes #  0.9 L    (1.0-4.8)  k/uL


 


Sodium     (137-145)  mmol/L


 


BUN     (9-20)  mg/dL


 


Creatinine     (0.66-1.25)  mg/dL


 


Glucose     (74-99)  mg/dL


 


POC Glucose (mg/dL)   130 H  117 H  (75-99)  mg/dL


 


Calcium     (8.4-10.2)  mg/dL














Assessment and Plan


(1) Partial bowel obstruction


Current Visit: Yes   Status: Acute   Code(s): K56.600 - PARTIAL INTESTINAL 

OBSTRUCTION, UNSPECIFIED AS TO CAUSE   SNOMED Code(s): 56825071


   





(2) Intractable left lower quadrant abdominal pain


Current Visit: Yes   Status: Acute   Code(s): R10.32 - LEFT LOWER QUADRANT PAIN

   SNOMED Code(s): 634799878


   





(3) Neoplasm of sigmoid colon


Current Visit: Yes   Status: Acute   Code(s): D49.0 - NEOPLASM OF UNSPECIFIED 

BEHAVIOR OF DIGESTIVE SYSTEM   SNOMED Code(s): 571987646


   





(4) Sigmoid volvulus


Current Visit: Yes   Status: Acute   Code(s): K56.2 - VOLVULUS   SNOMED Code(s)

: 205081155


   





(5) Large bowel obstruction


Current Visit: Yes   Status: Acute   Code(s): K56.609 - UNSP INTESTNL OBST, 

UNSP AS TO PARTIAL VERSUS COMPLETE OBST   SNOMED Code(s): 613568995


   





(6) Sigmoid diverticulosis


Current Visit: Yes   Status: Acute   Code(s): K57.30 - DVRTCLOS OF LG INT W/O 

PERFORATION OR ABSCESS W/O BLEEDING   SNOMED Code(s): 471037638


   





(7) Ischemic cardiomyopathy


Current Visit: Yes   Status: Acute   Code(s): I25.5 - ISCHEMIC CARDIOMYOPATHY   

SNOMED Code(s): 091971512


   





(8) History of cardiac catheterization


Current Visit: Yes   Status: Acute   Code(s): Z98.890 - OTHER SPECIFIED 

POSTPROCEDURAL STATES   SNOMED Code(s): 96339704344743


   


Plan: 





1.  Entereg for bowel function.


2.  Plan to re-start plavix on thursday pending stable hemoglobin.


3.  Plan to wean TPN when tolerating diet by Thursday.


4. PT/OT.

## 2018-06-27 LAB
ANION GAP SERPL CALC-SCNC: 10 MMOL/L
BASOPHILS # BLD AUTO: 0 K/UL (ref 0–0.2)
BASOPHILS NFR BLD AUTO: 0 %
BUN SERPL-SCNC: 10 MG/DL (ref 9–20)
CALCIUM SPEC-MCNC: 7.9 MG/DL (ref 8.4–10.2)
CHLORIDE SERPL-SCNC: 103 MMOL/L (ref 98–107)
CO2 SERPL-SCNC: 21 MMOL/L (ref 22–30)
EOSINOPHIL # BLD AUTO: 0 K/UL (ref 0–0.7)
EOSINOPHIL NFR BLD AUTO: 0 %
ERYTHROCYTE [DISTWIDTH] IN BLOOD BY AUTOMATED COUNT: 3.88 M/UL (ref 4.3–5.9)
ERYTHROCYTE [DISTWIDTH] IN BLOOD: 14.1 % (ref 11.5–15.5)
GLUCOSE BLD-MCNC: 113 MG/DL (ref 75–99)
GLUCOSE BLD-MCNC: 122 MG/DL (ref 75–99)
GLUCOSE BLD-MCNC: 122 MG/DL (ref 75–99)
GLUCOSE BLD-MCNC: 167 MG/DL (ref 75–99)
GLUCOSE SERPL-MCNC: 116 MG/DL (ref 74–99)
HCT VFR BLD AUTO: 35.8 % (ref 39–53)
HGB BLD-MCNC: 11.7 GM/DL (ref 13–17.5)
LYMPHOCYTES # SPEC AUTO: 1 K/UL (ref 1–4.8)
LYMPHOCYTES NFR SPEC AUTO: 7 %
MCH RBC QN AUTO: 30 PG (ref 25–35)
MCHC RBC AUTO-ENTMCNC: 32.5 G/DL (ref 31–37)
MCV RBC AUTO: 92.3 FL (ref 80–100)
MONOCYTES # BLD AUTO: 1.1 K/UL (ref 0–1)
MONOCYTES NFR BLD AUTO: 9 %
NEUTROPHILS # BLD AUTO: 11.1 K/UL (ref 1.3–7.7)
NEUTROPHILS NFR BLD AUTO: 82 %
PLATELET # BLD AUTO: 378 K/UL (ref 150–450)
POTASSIUM SERPL-SCNC: 4.3 MMOL/L (ref 3.5–5.1)
SODIUM SERPL-SCNC: 134 MMOL/L (ref 137–145)
WBC # BLD AUTO: 13.5 K/UL (ref 3.8–10.6)

## 2018-06-27 RX ADMIN — METOPROLOL TARTRATE SCH MG: 25 TABLET, FILM COATED ORAL at 21:29

## 2018-06-27 RX ADMIN — SODIUM CHLORIDE PRN MLS/HR: 9 INJECTION, SOLUTION INTRAVENOUS at 18:57

## 2018-06-27 RX ADMIN — HYDROCODONE BITARTRATE AND ACETAMINOPHEN PRN EACH: 7.5; 325 TABLET ORAL at 16:59

## 2018-06-27 RX ADMIN — CEFAZOLIN SCH MLS/HR: 330 INJECTION, POWDER, FOR SOLUTION INTRAMUSCULAR; INTRAVENOUS at 14:30

## 2018-06-27 RX ADMIN — ALVIMOPAN SCH: 12 CAPSULE ORAL at 12:43

## 2018-06-27 RX ADMIN — LISINOPRIL SCH: 5 TABLET ORAL at 12:44

## 2018-06-27 RX ADMIN — ATORVASTATIN CALCIUM SCH: 80 TABLET, FILM COATED ORAL at 12:44

## 2018-06-27 RX ADMIN — LEVOFLOXACIN SCH: 750 INJECTION, SOLUTION INTRAVENOUS at 12:44

## 2018-06-27 RX ADMIN — METRONIDAZOLE SCH MLS/HR: 500 INJECTION, SOLUTION INTRAVENOUS at 16:59

## 2018-06-27 RX ADMIN — PANTOPRAZOLE SODIUM SCH: 40 INJECTION, POWDER, FOR SOLUTION INTRAVENOUS at 12:44

## 2018-06-27 RX ADMIN — HEPARIN SODIUM SCH: 5000 INJECTION, SOLUTION INTRAVENOUS; SUBCUTANEOUS at 12:44

## 2018-06-27 RX ADMIN — METOPROLOL TARTRATE SCH: 25 TABLET, FILM COATED ORAL at 12:44

## 2018-06-27 RX ADMIN — CEFAZOLIN SCH: 330 INJECTION, POWDER, FOR SOLUTION INTRAMUSCULAR; INTRAVENOUS at 12:43

## 2018-06-27 RX ADMIN — CEFAZOLIN SCH MLS/HR: 330 INJECTION, POWDER, FOR SOLUTION INTRAMUSCULAR; INTRAVENOUS at 12:47

## 2018-06-27 RX ADMIN — METRONIDAZOLE SCH MLS/HR: 500 INJECTION, SOLUTION INTRAVENOUS at 12:47

## 2018-06-27 RX ADMIN — HEPARIN SODIUM SCH UNIT: 5000 INJECTION, SOLUTION INTRAVENOUS; SUBCUTANEOUS at 21:28

## 2018-06-27 RX ADMIN — METRONIDAZOLE SCH: 500 INJECTION, SOLUTION INTRAVENOUS at 12:43

## 2018-06-27 RX ADMIN — ALVIMOPAN SCH MG: 12 CAPSULE ORAL at 21:28

## 2018-06-27 NOTE — P.PN
Subjective


Progress Note Date: 06/27/18





58-year-old male seen and examined.  Standing up at the bedside.  Surgical 

dressing sites dry.  Abdominal binder in place.  Ostomy amount of brown liquid 

stool in the ostomy bag prevena left system in place reports no nausea 

vomiting.  Hemoglobin this morning 11.7.  Hemoglobin the day before 12.5.  Not 

tolerating a liquid diet states has no appetite.  Epidural in place currently 

at 10 anesthesia following nursing reports indwelling Londono catheter was 

removed yesterday straight cathed with 350 out.  Voided  only 50 mL this 

morning did note the patient has  increase edema scrotal and bilateral legs





Status post June 25th diagnostic arthroscopic converted to open small bowel 

decompression sigmoid colectomy involving a large proximal sigmoid tumor, 

descending colostomy creation Lacy's procedure with application of prevena 

wound system





Objective





- Vital Signs


Vital signs: 


 Vital Signs











Temp  98.8 F   06/27/18 00:38


 


Pulse  97   06/27/18 00:38


 


Resp  20   06/27/18 00:38


 


BP  121/74   06/27/18 00:38


 


Pulse Ox  94 L  06/27/18 00:38








 Intake & Output











 06/26/18 06/27/18 06/27/18





 18:59 06:59 18:59


 


Output Total 170 650 


 


Balance -170 -650 


 


Output:   


 


  Urine 170 650 


 


    Straight  650 


 


    Uretheral (Londono) 20  


 


Other:   


 


  Voiding Method Indwelling Catheter  


 


  # Bowel Movements 0  














- Exam





Physical exam


58-year-old male standing up at the bedside attempting to urinate but denies 

dizziness lightheadedness chest pain or shortness of breath


Lungs adequate air movement sats are documented 95% on room air


Heart S1-S2 audible and regular monitor denying chest


Abdomen abdominal binder on ostomy small amount of brown liquid stool pervena 

wound system in place Few Hypoactive bowel tones denies nausea vomiting 

surgical dressing site dry surgical tenderness appropriate nondistended


Extremities no edema noted to lower extreme





- Labs


CBC & Chem 7: 


 06/27/18 07:03





 06/26/18 08:09


Labs: 


 Abnormal Lab Results - Last 24 Hours (Table)











  06/26/18 06/26/18 06/27/18 Range/Units





  12:01 18:16 00:02 


 


WBC     (3.8-10.6)  k/uL


 


RBC     (4.30-5.90)  m/uL


 


Hgb     (13.0-17.5)  gm/dL


 


Hct     (39.0-53.0)  %


 


Neutrophils #     (1.3-7.7)  k/uL


 


Monocytes #     (0-1.0)  k/uL


 


POC Glucose (mg/dL)  130 H  117 H  122 H  (75-99)  mg/dL














  06/27/18 06/27/18 Range/Units





  05:55 07:03 


 


WBC   13.5 H  (3.8-10.6)  k/uL


 


RBC   3.88 L  (4.30-5.90)  m/uL


 


Hgb   11.7 L  (13.0-17.5)  gm/dL


 


Hct   35.8 L  (39.0-53.0)  %


 


Neutrophils #   11.1 H  (1.3-7.7)  k/uL


 


Monocytes #   1.1 H  (0-1.0)  k/uL


 


POC Glucose (mg/dL)  122 H   (75-99)  mg/dL














Assessment and Plan


Assessment: 





Impression


Present on admission abdominal pain nausea vomiting suspect due to partial 

bowel obstruction involving small and large bowel


CAT scan abdomen and pelvis obtained on admission report indicates mild acute 

diverticulitis mid to distal sigmoid colon causing partial small bowel 

obstruction involving small large bowel


Known coronary artery disease with prior coronary stenting done October 2017 on 

dual antiplatelet therapy Plavix and aspirin


History of tobacco abuse recently quit


A recent colonoscopy to the sigmoid colon with flexible sigmoidoscopy done 

February 2018 showed scattered diverticulosis of the sigmoid colon with severe 

torturous sigmoid colon with no evidence of an active bleed as part of a workup 

for acute blood loss anemia


A recent EGD February 2018 no evidence of an active duodenitis, no duodenal 

ulcer: Gastric ulcer


Abdominal x-ray done on the 18th findings are suggestive of severe ileus 


CAT scan abdomen and pelvis:colon Collapse with severely dilated small bowel


History of chronic daily alcohol use with no evidence of impending withdrawals


Status post June 25 diagnostic laparoscopic converted to open, small bowel 

decompression, sigmoid colectomy involving the large proximal sigmoid tumor, 

descending ostomy creation Lacy's procedure with peritoneal lavage 1 L, 

application of prevena wound system








Plan


Aspirin Plavix to be restarted


Add Flomax monitor the response


Continue postop surgical care


Ostomy teaching with appropriate


Will need prolonged antibiotics


Epidural per anesthesia


Monitor labs


DVT and GI prophylaxis


Pain control


PICC line for TPN for nutritional support 


 


Further surgical recommendations pending will follow with you











Progress note dictated for Dr. Giselle 








The above impression and plan of care have been discussed and directed by 

signing physician. Marlee Simon nurse practitioner acting as scribe for signing 

physician.

## 2018-06-27 NOTE — P.PN
Progress Note - Text


Date: 06/27/2018 Time: 0721


The patient is status post, exploratory laparotomy, postoperative day number 2


The patient has no complaints of nausea vomiting or headache.  The patient does 

not complain of any lower extremity numbness or weakness.  The epidural is 

running at 10 mL per hour.  VAS 1-10.  The epidural will be maintained and 

adjusted as needed.

## 2018-06-28 LAB
ANION GAP SERPL CALC-SCNC: 10 MMOL/L
BASOPHILS # BLD AUTO: 0 K/UL (ref 0–0.2)
BASOPHILS # BLD AUTO: 0 K/UL (ref 0–0.2)
BASOPHILS NFR BLD AUTO: 0 %
BASOPHILS NFR BLD AUTO: 0 %
BUN SERPL-SCNC: 6 MG/DL (ref 9–20)
CALCIUM SPEC-MCNC: 7.6 MG/DL (ref 8.4–10.2)
CHLORIDE SERPL-SCNC: 101 MMOL/L (ref 98–107)
CO2 SERPL-SCNC: 22 MMOL/L (ref 22–30)
EOSINOPHIL # BLD AUTO: 0 K/UL (ref 0–0.7)
EOSINOPHIL # BLD AUTO: 0 K/UL (ref 0–0.7)
EOSINOPHIL NFR BLD AUTO: 0 %
EOSINOPHIL NFR BLD AUTO: 1 %
ERYTHROCYTE [DISTWIDTH] IN BLOOD BY AUTOMATED COUNT: 3.61 M/UL (ref 4.3–5.9)
ERYTHROCYTE [DISTWIDTH] IN BLOOD BY AUTOMATED COUNT: 3.75 M/UL (ref 4.3–5.9)
ERYTHROCYTE [DISTWIDTH] IN BLOOD: 14.2 % (ref 11.5–15.5)
ERYTHROCYTE [DISTWIDTH] IN BLOOD: 14.6 % (ref 11.5–15.5)
GLUCOSE BLD-MCNC: 100 MG/DL (ref 75–99)
GLUCOSE BLD-MCNC: 109 MG/DL (ref 75–99)
GLUCOSE BLD-MCNC: 93 MG/DL (ref 75–99)
GLUCOSE SERPL-MCNC: 96 MG/DL (ref 74–99)
HCT VFR BLD AUTO: 33.4 % (ref 39–53)
HCT VFR BLD AUTO: 33.9 % (ref 39–53)
HGB BLD-MCNC: 10.9 GM/DL (ref 13–17.5)
HGB BLD-MCNC: 11.3 GM/DL (ref 13–17.5)
LYMPHOCYTES # SPEC AUTO: 0.6 K/UL (ref 1–4.8)
LYMPHOCYTES # SPEC AUTO: 0.8 K/UL (ref 1–4.8)
LYMPHOCYTES NFR SPEC AUTO: 13 %
LYMPHOCYTES NFR SPEC AUTO: 9 %
MCH RBC QN AUTO: 30.1 PG (ref 25–35)
MCH RBC QN AUTO: 30.2 PG (ref 25–35)
MCHC RBC AUTO-ENTMCNC: 32.6 G/DL (ref 31–37)
MCHC RBC AUTO-ENTMCNC: 33.3 G/DL (ref 31–37)
MCV RBC AUTO: 90.5 FL (ref 80–100)
MCV RBC AUTO: 92.4 FL (ref 80–100)
MONOCYTES # BLD AUTO: 0.4 K/UL (ref 0–1)
MONOCYTES # BLD AUTO: 0.5 K/UL (ref 0–1)
MONOCYTES NFR BLD AUTO: 6 %
MONOCYTES NFR BLD AUTO: 8 %
NEUTROPHILS # BLD AUTO: 4.5 K/UL (ref 1.3–7.7)
NEUTROPHILS # BLD AUTO: 5.4 K/UL (ref 1.3–7.7)
NEUTROPHILS NFR BLD AUTO: 75 %
NEUTROPHILS NFR BLD AUTO: 82 %
PH UR: 6 [PH] (ref 5–8)
PLATELET # BLD AUTO: 291 K/UL (ref 150–450)
PLATELET # BLD AUTO: 319 K/UL (ref 150–450)
POTASSIUM SERPL-SCNC: 3.8 MMOL/L (ref 3.5–5.1)
SODIUM SERPL-SCNC: 133 MMOL/L (ref 137–145)
SP GR UR: 1 (ref 1–1.03)
UROBILINOGEN UR QL STRIP: <2 MG/DL (ref ?–2)
WBC # BLD AUTO: 6 K/UL (ref 3.8–10.6)
WBC # BLD AUTO: 6.6 K/UL (ref 3.8–10.6)

## 2018-06-28 RX ADMIN — METOPROLOL TARTRATE SCH MG: 25 TABLET, FILM COATED ORAL at 22:26

## 2018-06-28 RX ADMIN — LISINOPRIL SCH MG: 5 TABLET ORAL at 09:45

## 2018-06-28 RX ADMIN — METOPROLOL TARTRATE SCH MG: 25 TABLET, FILM COATED ORAL at 09:45

## 2018-06-28 RX ADMIN — ALVIMOPAN SCH MG: 12 CAPSULE ORAL at 09:45

## 2018-06-28 RX ADMIN — LEVOFLOXACIN SCH MLS/HR: 750 INJECTION, SOLUTION INTRAVENOUS at 11:34

## 2018-06-28 RX ADMIN — ATORVASTATIN CALCIUM SCH MG: 80 TABLET, FILM COATED ORAL at 09:44

## 2018-06-28 RX ADMIN — TAMSULOSIN HYDROCHLORIDE SCH MG: 0.4 CAPSULE ORAL at 09:44

## 2018-06-28 RX ADMIN — METRONIDAZOLE SCH MLS/HR: 500 INJECTION, SOLUTION INTRAVENOUS at 06:24

## 2018-06-28 RX ADMIN — HYDROCODONE BITARTRATE AND ACETAMINOPHEN PRN EACH: 7.5; 325 TABLET ORAL at 00:19

## 2018-06-28 RX ADMIN — HEPARIN SODIUM SCH UNIT: 5000 INJECTION, SOLUTION INTRAVENOUS; SUBCUTANEOUS at 09:44

## 2018-06-28 RX ADMIN — HEPARIN SODIUM SCH UNIT: 5000 INJECTION, SOLUTION INTRAVENOUS; SUBCUTANEOUS at 22:27

## 2018-06-28 RX ADMIN — ONDANSETRON PRN MG: 2 INJECTION INTRAMUSCULAR; INTRAVENOUS at 06:24

## 2018-06-28 RX ADMIN — HYDROCODONE BITARTRATE AND ACETAMINOPHEN PRN EACH: 7.5; 325 TABLET ORAL at 19:30

## 2018-06-28 RX ADMIN — ALVIMOPAN SCH MG: 12 CAPSULE ORAL at 22:26

## 2018-06-28 RX ADMIN — DEXTROSE MONOHYDRATE SCH: 5 INJECTION, SOLUTION INTRAVENOUS at 14:11

## 2018-06-28 RX ADMIN — PANTOPRAZOLE SODIUM SCH MG: 40 TABLET, DELAYED RELEASE ORAL at 09:45

## 2018-06-28 RX ADMIN — METRONIDAZOLE SCH MLS/HR: 500 INJECTION, SOLUTION INTRAVENOUS at 23:25

## 2018-06-28 RX ADMIN — METOCLOPRAMIDE PRN MG: 5 INJECTION, SOLUTION INTRAMUSCULAR; INTRAVENOUS at 18:38

## 2018-06-28 RX ADMIN — METRONIDAZOLE SCH MLS/HR: 500 INJECTION, SOLUTION INTRAVENOUS at 17:25

## 2018-06-28 RX ADMIN — METRONIDAZOLE SCH MLS/HR: 500 INJECTION, SOLUTION INTRAVENOUS at 11:36

## 2018-06-28 RX ADMIN — LEVOFLOXACIN SCH MLS/HR: 750 INJECTION, SOLUTION INTRAVENOUS at 09:43

## 2018-06-28 RX ADMIN — SODIUM CHLORIDE PRN MLS/HR: 9 INJECTION, SOLUTION INTRAVENOUS at 03:28

## 2018-06-28 RX ADMIN — METRONIDAZOLE SCH MLS/HR: 500 INJECTION, SOLUTION INTRAVENOUS at 00:22

## 2018-06-28 RX ADMIN — CEFAZOLIN SCH MLS/HR: 330 INJECTION, POWDER, FOR SOLUTION INTRAMUSCULAR; INTRAVENOUS at 09:46

## 2018-06-28 NOTE — P.PN
Progress Note - Text


Date: 06/28/2018 Time: 1042


The patient is status post, exploratory laparotomy, postoperative day number 3


The patient has no complaints of nausea vomiting or headache.  The patient does 

not complain of any lower extremity numbness or weakness.  The epidural is 

running at[ 10] mL per hour.  VAS 1-10.  The epidural will be discontinued this 

morning.  Pain medicines will be provided to the patient by the service.

## 2018-06-28 NOTE — P.PN
Subjective


Progress note  being dictated for Dr. Burris














Interval history: This is a 58-year-old gentleman admitted with partial  bowel 

obstruction involving both small and large bowel and multiple other medical 

issues.  NG tube and Londono catheter discontinued yesterday.  Reports multiple 

bowel movements, yet abdomen remains distended.  Denies abdominal tenderness.  

Follow-up Abdominal x-ray reporting continued marked diffuse small bowel 

dilatation, prominent air throughout the right;, suggestive of severe ileus 

rather than small bowel obstruction and no free intraperitoneal air below the 

hemidiaphragms .complains of shortness of breath, suspect related to abdominal 

distention, chest x-ray ordered. Voiding without difficulty.  Complains of 

nausea, on clear liquid diet as per surgery.  Afebrile.  Blood cultures 

negative.











06/19/2018 increased abdominal pain during the night and NG tube reinserted as 

per surgery. Abdomen remains firm and distended, with 450 MLS dark brownish 

drainage over the last 8 hours.positive fluctuating abdominal pain.Diagnostic 

laparoscopic lysis of adhesions recommend per surgery.  Aspirin, Plavix placed 

on hold, maintained on Lovenox.  PPN nitiated.  Maintained on IV fluid 

hydration. Cardiology consulted regarding anticoagulation recommendations/ 

surgery.














06/20/2018 receiving IV fluid hydration. PPN unavailable from pharmacy, TPN 

ordered as per surgery.  Scheduled for PICC line placement. Patient ambulating 

to and from bathroom, complaining of exertional dyspnea.  Chest x-ray reporting 

possible early pneumonia right lung base, mild atelectasis. Minimal improvement 

in abdominal distention.  Reports mild improvement in abdominal pain.  NG  

drainage of around 800ml /8hrs. Telemetry reporting sinus rhythm to sinus tach, 

low 100s.  Plavix and aspirin remain on hold for upcoming surgery, Saturday.  

Afebrile








6/21/18 NG tube "fell out last night".  Minimal improvement in abdominal pain, 

abdominal distention.  Mild nausea, no bowel movement.  No emesis.  Awaiting 

PICC line placement.  Afebrile, maintaining O2 sats of 95-97% on room air.





Review of systems:





CONSTITUTIONAL: No fever, positive fatigue. 


HEENT: No recent visual problems or hearing problems. Denied any sore throat. 


CARDIOVASCULAR: No chest pain,  no palpitations, no syncope. 


PULMONARY: Exertional shortness of breath, no cough, no hemoptysis. 


GASTROINTESTINAL: No diarrhea, positive nausea, no vomiting, positive abdominal 

pain. 


NEUROLOGICAL: No headaches, generalized weakness, no numbness. 


HEMATOLOGICAL: Denies any bleeding or petechiae. 


GENITOURINARY: Denies any burning micturition, frequency, or urgency.  


MUSCULOSKELETAL/RHEUMATOLOGICAL: Denies any joint pain, swelling, or any muscle 

pain. 


ENDOCRINE: Denies any polyuria or polydipsia.


PSYCHIATRIC: No anxiety, no depression





The rest of the 14 point review of systems is negative

















Active Medications





Hydrocodone Bitart/Acetaminophen (Norco 7.5-325)  1 each PO Q6H PRN


   PRN Reason: Pain


   Last Admin: 06/19/18 09:09 Dose:  1 each


Atorvastatin Calcium (Lipitor)  80 mg PO QAM [facility]


   Last Admin: 06/21/18 07:27 Dose:  80 mg


Benzocaine/Menthol (Cepacol Lozenge)  1 each MUCOUS MEM Q4HR PRN


   PRN Reason: Sore Throat


   Last Admin: 06/16/18 10:28 Dose:  1 each


Enoxaparin Sodium (Lovenox)  30 mg SQ Q12HR [facility]


   Last Admin: 06/21/18 21:51 Dose:  Not Given


Famotidine (Pepcid)  20 mg IV Q12HR [facility]


   Last Admin: 06/21/18 21:52 Dose:  20 mg


Hydromorphone HCl (Dilaudid)  1 mg IVP Q4HR PRN


   PRN Reason: Pain


   Last Admin: 06/21/18 21:45 Dose:  1 mg


Levofloxacin 750 mg/ IV (Solution)  150 mls @ 100 mls/hr IVPB Q24H [facility]


   Last Admin: 06/21/18 07:27 Dose:  100 mls/hr


Metronidazole 500 mg/ IV (Solution)  100 mls @ 100 mls/hr IVPB Q6HR [facility]


   Last Admin: 06/21/18 17:21 Dose:  100 mls/hr


Sodium Chloride (Saline 0.9%)  1,000 mls @ 125 mls/hr IV .Q8H [facility]


   Last Admin: 06/21/18 21:52 Dose:  125 mls/hr


Parenteral Vitamin Supplement 10 ml/ Chromium/Copper/Manganese/Seleni/Zn 1 ml/

Total Parenteral Nutrition  1,551 mls @ 64 mls/hr IV .Q24H [facility]


   Last Admin: 06/21/18 13:22 Dose:  64 mls/hr


Dextrose/Sodium Chloride (Dextrose 5%-Ns Iv Soln)  1,000 mls @ 20 mls/hr IV 

.Q24H [facility]


   Last Admin: 06/21/18 07:26 Dose:  Not Given


Lisinopril (Zestril)  5 mg PO DAILY [facility]


   Last Admin: 06/21/18 07:27 Dose:  5 mg


Lorazepam (Ativan)  1 mg IV Q2HR PRN


   PRN Reason: CIWA 8 or 9


   Last Admin: 06/19/18 06:13 Dose:  1 mg


Lorazepam (Ativan)  1 mg IV Q1HR PRN


   PRN Reason: CIWA 10 to 15


Metoclopramide HCl (Reglan)  5 mg IVP Q6HR PRN


   PRN Reason: Nausea And Vomiting


   Last Admin: 06/20/18 19:45 Dose:  5 mg


Metoprolol Tartrate (Lopressor)  25 mg PO BID [facility]


   Last Admin: 06/21/18 21:50 Dose:  25 mg


Miscellaneous Information (Potassium Per Protocol)  1 each MISCELLANE DAILY PRN

; Protocol


   PRN Reason: Per Protocol


Naloxone HCl (Narcan)  0.2 mg IV Q2M PRN


   PRN Reason: Opioid Reversal








06/22/2018 scheduled for surgery tomorrow.  Abdominal distention mildly improved

, no bowel movements.  Reports improvement in abdominal pain/cramping.  

Receiving TPN via PICC line.  Afebrile.  Denies chest pain, palpitations or 

shortness of breath.














06/25/2018 reports a couple bowel movements both last night and this morning.  

Currently abdominal pain controlled.  Denies chest pain, palpitations.  

Afebrile.








06/26/2018 status post sigmoid colectomy involving large proximal sigmoid tumor

, descending colostomy, Galvez's procedure, postop day #1.  Maintained on TPN, 

clear liquids.  No nausea or vomiting.  Patient reports much less pain, 

currently maintained on epidural.  Wound VAC present.








6/27/18 Edematous. Difficulty voiding, required staight cath. during the night. 

Pain controlled with epidural. Minimal intake of liquid diet.Denies nausea and 

vomiting.Functioning ostomy.HGB 11.7.
































  












































Objective





- Vital Signs


Vital signs: 


 Vital Signs











Temp  99.4 F   06/27/18 18:10


 


Pulse  108 H  06/27/18 17:18


 


Resp  12   06/27/18 17:18


 


BP  169/71   06/27/18 14:48


 


Pulse Ox  95   06/27/18 14:48








 Intake & Output











 06/27/18 06/27/18 06/28/18





 06:59 18:59 06:59


 


Intake Total  825 


 


Output Total 650 3350 


 


Balance -650 -2520 


 


Intake:   


 


  Intake, IV Titration  225 





  Amount   


 


    Ropivacaine 250 mg  225 





    Hydromorphone (Pf) 5 mg   





    In Sodium Chloride 0.9%   





    200 ml @ Per Protocol   





    EPIDURAL .Q0M PRN Rx#:   





    [id number]   


 


  Oral  600 


 


Output:   


 


  Urine 650 2950 


 


    Straight 650 850 


 


  Stool  400 


 


Other:   


 


  Voiding Method  Indwelling Catheter 














- Exam


PHYSICAL EXAM:


VITAL SIGNS: [As above]


GENERAL: Standing up at bedside, no acute distress


HEENT:  Conjunctivae normal. eyes normal.  Oral mucosa dry. 


NECK:  No JVD. No thyroid enlargement. No LNs


CARDIOVASCULAR:  S1, S2 muffled. No murmur


RESPIRATION: Breath sounds diminished in the bases. No rhonchi or crackles.  No 

wheezing


ABDOMEN:  less Firm, distended, status post surgery, colostomy with drainage, 

abdominal binder,hypoactive bowel sounds.  Wound VAC present.


LEGS: POsitive edema, scrotal edema


PSYCHIATRY: Alert and oriented -3, mood and affect normal.


NERVOUS SYSTEM:  Cranial N 2-12 grossly normal. Moves all 4 limbs.  Diffuse 

weakness No focal deficits. 














- Labs


CBC & Chem 7: 


 06/28/18 07:38





 06/28/18 07:38


Labs: 


 Abnormal Lab Results - Last 24 Hours (Table)











  06/27/18 06/27/18 06/27/18 Range/Units





  00:02 05:55 07:03 


 


WBC    13.5 H  (3.8-10.6)  k/uL


 


RBC    3.88 L  (4.30-5.90)  m/uL


 


Hgb    11.7 L  (13.0-17.5)  gm/dL


 


Hct    35.8 L  (39.0-53.0)  %


 


Neutrophils #    11.1 H  (1.3-7.7)  k/uL


 


Monocytes #    1.1 H  (0-1.0)  k/uL


 


Sodium     (137-145)  mmol/L


 


Carbon Dioxide     (22-30)  mmol/L


 


Glucose     (74-99)  mg/dL


 


POC Glucose (mg/dL)  122 H  122 H   (75-99)  mg/dL


 


Calcium     (8.4-10.2)  mg/dL














  06/27/18 06/27/18 06/27/18 Range/Units





  07:03 12:22 17:57 


 


WBC     (3.8-10.6)  k/uL


 


RBC     (4.30-5.90)  m/uL


 


Hgb     (13.0-17.5)  gm/dL


 


Hct     (39.0-53.0)  %


 


Neutrophils #     (1.3-7.7)  k/uL


 


Monocytes #     (0-1.0)  k/uL


 


Sodium  134 L    (137-145)  mmol/L


 


Carbon Dioxide  21 L    (22-30)  mmol/L


 


Glucose  116 H    (74-99)  mg/dL


 


POC Glucose (mg/dL)   113 H  167 H  (75-99)  mg/dL


 


Calcium  7.9 L    (8.4-10.2)  mg/dL














Assessment and Plan


Assessment: 


1.  Acute bowel obstruction secondary to sigmoid mass possibly including 

colonic and small intestinal obstruction.status post sigmoid colectomy 

involving large proximal sigmoid tumor, descending colostomy, Galvez's 

procedure,.


2.  Possible acute mild diverticulitis


3.  Recent colonoscopy 2/2018 reporting scattered diverticulosis of sigmoid 

colon with severe tortuous sigmoid colon.


4.  CAD, stenting in October 2017 on dual antiplatelet therapy-currently on hold


5.  Chronic daily ETOH use


5.  Status post PICC line placement 








Plan: Continue current medication regime ,monitoring and symptomatic 

treatment.Flomax added to regime, difficulty voiding. Lasix  ordered X 1. 

Decreasing IV maintenance fluids and TPN down to total of 75ml hr, antibiotics. 

Elevate scotum/legss. Pathology pending.  Aggressive pulmonary toileting with 

incentive spirometer reinforced.  Pain management as per surgery .Patient had 

been afebrile, spiked fever  of 102, orders given for ID consult.Prognosis 

guarded given multiple complex medical issues.














The impression and plan of care has been dictated as directed.





:


I performed a history and examination of this patient,  discussed the same with 

the dictator.  I agree with the dictator's note ,documented as a scribe.  Any 

additional findings or plans will be noted.

## 2018-06-28 NOTE — CONS
CONSULTATION



DATE OF SERVICE:

06/28/2018



REASON FOR CONSULTATION:

Fever.



HISTORY OF PRESENT ILLNESS:

The patient is a 58-year-old  male who presented to the ER at [facility] more than 2 weeks ago with the chief complaint of abdominal discomfort.

His symptoms started 2 days prior to presentation to hospital and had gradually

increased in severity and was worse the night before he presented to the hospital.  The

patient was evaluated by the ER physician.  He had a CT scan of the abdomen and pelvis

that was reported positive for possible mild acute diverticulitis in mid to distal

sigmoid colon.  CT was repeated the same afternoon and showed findings compatible with

acute sigmoid diverticulitis.  However, there remains concern about unifocal or

multifocal colonic new process, as there were multiple prominent new enlarged

mesenteric lymph nodes seen. Patient has been evaluated by General Surgery and medical

team and has been treated medically with IV Flagyl and Levaquin.  Patient subsequently

was taken to the OR 9 days later with concern about small bowel obstruction _____ along

with sigmoid volvulus.  The patient is status post diagnostic laparoscopy _____ EGD

followed by open laparotomy, colostomy creation.  The patient is being managed by

Internal Medicine and General Surgery Services.  The patient did have a fever last

evening of 102 degrees Fahrenheit with 100.6 this morning, for which Infectious Disease

was consulted for further recommendation regarding antibiotic therapy.  The patient

denies having any URI symptoms.  The patient denies having any chest pain or shortness

of breath.  Very minimal cough.  He has been complaining of pain in the abdominal area,

though denies having any worsening. Pain is a dull aching pain, 3 to 4 out of 10, and

no radiation.  The patient did have colostomy with output.  Denies having any nausea or

any vomiting. He is tolerating a clear liquid diet. Did have output in his colostomy.

He did have urine retention requiring a Londono catheter placement. Currently he has a

PICC line that was placed on 6/21.



REVIEW OF SYSTEMS:

CONSTITUTIONAL: Positive for weakness and a fever last evening and low-grade fever this

morning.

EYES: No complaint.

ENT: No complaint.

RESPIRATORY: No complaint.

CARDIOVASCULAR: No complaint.

GENITOURINARY: No complaint.

GASTROINTESTINAL: As per HPI.

MUSCULOSKELETAL:  No complaint.

INTEGUMENTARY: No complaint.

PSYCHOLOGICAL: No complaint.

ENDOCRINE: No complaint.

NEUROLOGICAL: No complaint.



PAST MEDICAL HISTORY:

1. Coronary artery disease.

2. Myocardial infarction.

3. Skin cancer.

4. Kidney stone.



PAST SURGICAL HISTORY:

1. PTCA with stent.

2. Tonsillectomy.

3. Skin cancer removal.



SOCIAL HISTORY:

Remote history of smoker. Daily drinks. No drug use.



FAMILY HISTORY:

Mother with history CVA, TIA.  Father with history of pancreatic cancer.



ALLERGIES:

NO KNOWN DRUG ALLERGIES.



CURRENT MEDICATIONS:

1. Tylenol.

2. Norco.

3. Lipitor.

4. Benadryl.

5. Heparin.

6. Dilaudid.

7. Ativan.

8. Reglan.

9. Lopressor.

10.Flagyl.

11.Levaquin.

12.Zofran.

13.Protonix.

14.Flomax.



PHYSICAL EXAMINATION:

Blood pressure is 101/53 with a pulse of 107, temperature 99.6.  He is 95% on room air.

General description is a middle-aged male lying in bed in no distress.  No tachypnea or

accessory muscle of respiration use.

HEENT examination shows pallor. No scleral icterus.  Oral mucosa membrane is dry.  No

pharyngeal erythema or thrush.

NECK: Trachea is central. No thyromegaly.

LUNGS: Unlabored breathing with decreased breath sounds at the base but no wheeze or

crackle.

HEART: S1, S2.  Regular rate and rhythm.

ABDOMEN: Soft. The midline incision is currently with a wound V.A.C. Colostomy did have

the output.

EXTREMITIES: No edema of feet.

SKIN EXAMINATION: No rash or mass palpable.

Neurologically patient is awake, alert, oriented x3. Mood and affect normal.



LABS:

Hemoglobin 11.3, white count 6.0, BUN of 6, creatinine 0.75.  Stool for C difficile was

negative.



DIAGNOSTIC IMPRESSION AND PLAN:

Patient with a fever in a patient who did have acute diverticulitis, small bowel

obstruction, sigmoid volvulus, status post laparotomy with diverting colostomy on

6/23/2018.  The patient has been on Levaquin and Flagyl for more than 2 weeks now,

failing oral Levaquin therapy and likely concern about intraabdominal _____ responsible

for this episode of fever, as clinically no other clinical focus of infection. Chest

was clear to auscultation.  He did have a Londono catheter and PICC line. Those will be

the other sources, but more likely an abdominal process.



PLAN:

1. Will obtain a chest x-ray, P/A and lateral.

2. Will get a UA and culture to complete the workup.

3. Discontinue the Levaquin.

4. Will start the patient on Zosyn 3.375 grams q.8 hours.

5. Will follow up on his clinical condition and culture to further adjust medication

    if needed.

Thank you for this consultation. Will follow this patient along with you.





MMODL / IJN: [id number] / Job#: [id number]

## 2018-06-28 NOTE — P.PN
Subjective


Progress Note Date: 06/28/18


Progress note  being dictated for Dr. Lopez














Interval history: This is a 58-year-old gentleman admitted with partial  bowel 

obstruction involving both small and large bowel and multiple other medical 

issues.  NG tube and Londono catheter discontinued yesterday.  Reports multiple 

bowel movements, yet abdomen remains distended.  Denies abdominal tenderness.  

Follow-up Abdominal x-ray reporting continued marked diffuse small bowel 

dilatation, prominent air throughout the right;, suggestive of severe ileus 

rather than small bowel obstruction and no free intraperitoneal air below the 

hemidiaphragms .complains of shortness of breath, suspect related to abdominal 

distention, chest x-ray ordered. Voiding without difficulty.  Complains of 

nausea, on clear liquid diet as per surgery.  Afebrile.  Blood cultures 

negative.











06/19/2018 increased abdominal pain during the night and NG tube reinserted as 

per surgery. Abdomen remains firm and distended, with 450 MLS dark brownish 

drainage over the last 8 hours.positive fluctuating abdominal pain.Diagnostic 

laparoscopic lysis of adhesions recommend per surgery.  Aspirin, Plavix placed 

on hold, maintained on Lovenox.  PPN nitiated.  Maintained on IV fluid 

hydration. Cardiology consulted regarding anticoagulation recommendations/ 

surgery.














06/20/2018 receiving IV fluid hydration. PPN unavailable from pharmacy, TPN 

ordered as per surgery.  Scheduled for PICC line placement. Patient ambulating 

to and from bathroom, complaining of exertional dyspnea.  Chest x-ray reporting 

possible early pneumonia right lung base, mild atelectasis. Minimal improvement 

in abdominal distention.  Reports mild improvement in abdominal pain.  NG  

drainage of around 800ml /8hrs. Telemetry reporting sinus rhythm to sinus tach, 

low 100s.  Plavix and aspirin remain on hold for upcoming surgery, Saturday.  

Afebrile








6/21/18 NG tube "fell out last night".  Minimal improvement in abdominal pain, 

abdominal distention.  Mild nausea, no bowel movement.  No emesis.  Awaiting 

PICC line placement.  Afebrile, maintaining O2 sats of 95-97% on room air.





Review of systems:





CONSTITUTIONAL: No fever, positive fatigue. 


HEENT: No recent visual problems or hearing problems. Denied any sore throat. 


CARDIOVASCULAR: No chest pain,  no palpitations, no syncope. 


PULMONARY: Exertional shortness of breath, no cough, no hemoptysis. 


GASTROINTESTINAL: No diarrhea, positive nausea, no vomiting, positive abdominal 

pain. 


NEUROLOGICAL: No headaches, generalized weakness, no numbness. 


HEMATOLOGICAL: Denies any bleeding or petechiae. 


GENITOURINARY: Denies any burning micturition, frequency, or urgency.  


MUSCULOSKELETAL/RHEUMATOLOGICAL: Denies any joint pain, swelling, or any muscle 

pain. 


ENDOCRINE: Denies any polyuria or polydipsia.


PSYCHIATRIC: No anxiety, no depression





The rest of the 14 point review of systems is negative

















Active Medications





Hydrocodone Bitart/Acetaminophen (Norco 7.5-325)  1 each PO Q6H PRN


   PRN Reason: Pain


   Last Admin: 06/19/18 09:09 Dose:  1 each


Atorvastatin Calcium (Lipitor)  80 mg PO QAM [facility]


   Last Admin: 06/21/18 07:27 Dose:  80 mg


Benzocaine/Menthol (Cepacol Lozenge)  1 each MUCOUS MEM Q4HR PRN


   PRN Reason: Sore Throat


   Last Admin: 06/16/18 10:28 Dose:  1 each


Enoxaparin Sodium (Lovenox)  30 mg SQ Q12HR [facility]


   Last Admin: 06/21/18 21:51 Dose:  Not Given


Famotidine (Pepcid)  20 mg IV Q12HR [facility]


   Last Admin: 06/21/18 21:52 Dose:  20 mg


Hydromorphone HCl (Dilaudid)  1 mg IVP Q4HR PRN


   PRN Reason: Pain


   Last Admin: 06/21/18 21:45 Dose:  1 mg


Levofloxacin 750 mg/ IV (Solution)  150 mls @ 100 mls/hr IVPB Q24H [facility]


   Last Admin: 06/21/18 07:27 Dose:  100 mls/hr


Metronidazole 500 mg/ IV (Solution)  100 mls @ 100 mls/hr IVPB Q6HR [facility]


   Last Admin: 06/21/18 17:21 Dose:  100 mls/hr


Sodium Chloride (Saline 0.9%)  1,000 mls @ 125 mls/hr IV .Q8H [facility]


   Last Admin: 06/21/18 21:52 Dose:  125 mls/hr


Parenteral Vitamin Supplement 10 ml/ Chromium/Copper/Manganese/Seleni/Zn 1 ml/

Total Parenteral Nutrition  1,551 mls @ 64 mls/hr IV .Q24H [facility]


   Last Admin: 06/21/18 13:22 Dose:  64 mls/hr


Dextrose/Sodium Chloride (Dextrose 5%-Ns Iv Soln)  1,000 mls @ 20 mls/hr IV 

.Q24H [facility]


   Last Admin: 06/21/18 07:26 Dose:  Not Given


Lisinopril (Zestril)  5 mg PO DAILY [facility]


   Last Admin: 06/21/18 07:27 Dose:  5 mg


Lorazepam (Ativan)  1 mg IV Q2HR PRN


   PRN Reason: CIWA 8 or 9


   Last Admin: 06/19/18 06:13 Dose:  1 mg


Lorazepam (Ativan)  1 mg IV Q1HR PRN


   PRN Reason: CIWA 10 to 15


Metoclopramide HCl (Reglan)  5 mg IVP Q6HR PRN


   PRN Reason: Nausea And Vomiting


   Last Admin: 06/20/18 19:45 Dose:  5 mg


Metoprolol Tartrate (Lopressor)  25 mg PO BID [facility]


   Last Admin: 06/21/18 21:50 Dose:  25 mg


Miscellaneous Information (Potassium Per Protocol)  1 each MISCELLANE DAILY PRN

; Protocol


   PRN Reason: Per Protocol


Naloxone HCl (Narcan)  0.2 mg IV Q2M PRN


   PRN Reason: Opioid Reversal








06/22/2018 scheduled for surgery tomorrow.  Abdominal distention mildly improved

, no bowel movements.  Reports improvement in abdominal pain/cramping.  

Receiving TPN via PICC line.  Afebrile.  Denies chest pain, palpitations or 

shortness of breath.














06/25/2018 reports a couple bowel movements both last night and this morning.  

Currently abdominal pain controlled.  Denies chest pain, palpitations.  

Afebrile.








06/26/2018 status post sigmoid colectomy involving large proximal sigmoid tumor

, descending colostomy, Galvez's procedure, postop day #1.  Maintained on TPN, 

clear liquids.  No nausea or vomiting.  Patient reports much less pain, 

currently maintained on epidural.  Wound VAC present.








6/27/18 Edematous. Difficulty voiding, required staight cath. during the night. 

Pain controlled with epidural. Minimal intake of liquid diet.Denies nausea and 

vomiting.Functioning ostomy.HGB 11.7.








06/28/2018   T-max 102, infectious disease consulted. Significant edema,

persists.  TPN discontinued yesterday, diet advanced today to soft, consuming 50

%.  Functioning ostomy, copious stool, tested negative for C. difficile.  

Epidural has just been discontinued.  Hemoglobin 10.9.  IS 1500.
































  












































Objective





- Vital Signs


Vital signs: 


 Vital Signs











Temp  99.6 F   06/28/18 14:26


 


Pulse  107 H  06/28/18 14:26


 


Resp  18   06/28/18 14:26


 


BP  101/53   06/28/18 14:26


 


Pulse Ox  95   06/28/18 14:26








 Intake & Output











 06/27/18 06/28/18 06/28/18





 18:59 06:59 18:59


 


Intake Total 825 68.133 1200


 


Output Total 3350  4850


 


Balance -2525 68.133 -3650


 


Weight  74 kg 74 kg


 


Intake:   


 


  Intake, IV Titration 225 68.133 





  Amount   


 


    Ropivacaine 250 mg 225 68.133 





    Hydromorphone (Pf) 5 mg   





    In Sodium Chloride 0.9%   





    200 ml @ Per Protocol   





    EPIDURAL .Q0M PRN Rx#:   





    [id number]   


 


  Oral 600  1200


 


Output:   


 


  Urine 2950  3750


 


    Straight 850  850


 


  Stool 400  1100


 


Other:   


 


  Voiding Method Indwelling Catheter  Indwelling Catheter


 


  # Voids   300


 


  # Bowel Movements   300














- Exam


PHYSICAL EXAM:


VITAL SIGNS: [As above]


GENERAL: Sitting up in chair, no acute distress


HEENT:  Conjunctivae normal. eyes normal.  Oral mucosa moist. 


NECK:  No JVD. No thyroid enlargement. No LNs


CARDIOVASCULAR:  S1, S2 muffled. No murmur


RESPIRATION: Breath sounds diminished in the bases. No rhonchi or crackles.  No 

wheezing


ABDOMEN:  less Firm, mildly distended, status post surgery, colostomy with 

drainage, abdominal binder,hypoactive bowel sounds. Prevena system present.


LEGS: POsitive edema, scrotal edema


PSYCHIATRY: Alert and oriented -3, mood and affect normal.


NERVOUS SYSTEM:  Cranial N 2-12 grossly normal. Moves all 4 limbs.  Diffuse 

weakness No focal deficits. 














- Labs


CBC & Chem 7: 


 06/28/18 07:38





 06/28/18 07:38


Labs: 


 Abnormal Lab Results - Last 24 Hours (Table)











  06/28/18 06/28/18 06/28/18 Range/Units





  00:36 07:38 07:38 


 


RBC   3.61 L   (4.30-5.90)  m/uL


 


Hgb   10.9 L   (13.0-17.5)  gm/dL


 


Hct   33.4 L   (39.0-53.0)  %


 


Lymphocytes #   0.6 L   (1.0-4.8)  k/uL


 


Sodium    133 L  (137-145)  mmol/L


 


BUN    6 L  (9-20)  mg/dL


 


POC Glucose (mg/dL)  109 H    (75-99)  mg/dL


 


Calcium    7.6 L  (8.4-10.2)  mg/dL














  06/28/18 Range/Units





  12:21 


 


RBC   (4.30-5.90)  m/uL


 


Hgb   (13.0-17.5)  gm/dL


 


Hct   (39.0-53.0)  %


 


Lymphocytes #   (1.0-4.8)  k/uL


 


Sodium   (137-145)  mmol/L


 


BUN   (9-20)  mg/dL


 


POC Glucose (mg/dL)  100 H  (75-99)  mg/dL


 


Calcium   (8.4-10.2)  mg/dL














Assessment and Plan


Assessment: 


1.  Acute bowel obstruction secondary to sigmoid mass possibly including 

colonic and small intestinal obstruction.status post sigmoid colectomy 

involving large proximal sigmoid tumor, descending colostomy, Galvez's 

procedure,.


2.  Possible acute mild diverticulitis


3.  Recent colonoscopy 2/2018 reporting scattered diverticulosis of sigmoid 

colon with severe tortuous sigmoid colon.


4.  CAD, stenting in October 2017 on dual antiplatelet therapy-currently on hold


5.  Chronic daily ETOH use


5.  Status post PICC line placement 








Plan: Continue current medication regime ,monitoring and symptomatic 

treatment.hemoglobin trending down, repeat CBC later this afternoon.  Maintain 

IV antibiotic.  Infectious disease consult in place with recommendations 

pending.  IV fluids weaned off. Elevate scotum/legss. Pathology pending.  

Aggressive pulmonary toileting with incentive spirometer reinforced.  Pain 

management as per surgery .Prognosis guarded given multiple complex medical 

issues.














The impression and plan of care has been dictated as directed.





:


I performed a history and examination of this patient,  discussed the same with 

the dictator.  I agree with the dictator's note ,documented as a scribe.  Any 

additional findings or plans will be noted.

## 2018-06-28 NOTE — XR
EXAMINATION TYPE: XR chest 1V portable

 

DATE OF EXAM: 6/28/2018

 

COMPARISON: 6/20/2018

 

HISTORY: Edema

 

TECHNIQUE: Single frontal view of the chest is obtained.

 

FINDINGS:  Heart and mediastinum are normal. Lungs are clear. There is left subclavian catheter with 
the tip in the superior vena cava. There is no pleural effusion. There is no heart failure.

 

IMPRESSION:  No active cardiopulmonary disease. No change. No pneumothorax. New left subclavian joel
ter.

## 2018-06-28 NOTE — P.PN
<Marlee Simon - Last Filed: 06/28/18 11:09>





Subjective


Progress Note Date: 06/28/18





4-year-old male seen and examined.  Currently sitting up in a chair legs are 

elevated.  Decrease scrotal edema compared to prior assessment.  Bilateral 

lower extremity edema persist.  TPN has been weaned off.  Has had copious 

amounts of stool from the ostomy.  Has a prevena system in place to surgical 

site.  Abdomen slight tenderness slightly distended abdominal binder in place 

temp this morning 100.6 temp maxed 102 at 5:00 last evening white count 6.6.  

Hemoglobin this morning 10.9











Status post June 25th diagnostic arthroscopic converted to open small bowel 

decompression sigmoid colectomy involving a large proximal sigmoid tumor, 

descending colostomy creation Lacy's procedure with application of prevena 

wound system








Objective





- Vital Signs


Vital signs: 


 Vital Signs











Temp  100.6 F H  06/28/18 06:30


 


Pulse  107 H  06/28/18 06:30


 


Resp  20   06/28/18 06:30


 


BP  126/76   06/28/18 06:30


 


Pulse Ox  97   06/28/18 06:30








 Intake & Output











 06/27/18 06/28/18 06/28/18





 18:59 06:59 18:59


 


Intake Total 825 68.133 


 


Output Total 3350  600


 


Balance -2525 68.133 -600


 


Weight  74 kg 74 kg


 


Intake:   


 


  Intake, IV Titration 225 68.133 





  Amount   


 


    Ropivacaine 250 mg 225 68.133 





    Hydromorphone (Pf) 5 mg   





    In Sodium Chloride 0.9%   





    200 ml @ Per Protocol   





    EPIDURAL .Q0M PRN Rx#:   





    [id number]   


 


  Oral 600  


 


Output:   


 


  Urine 2950  


 


    Straight 850  


 


  Stool 400  600


 


Other:   


 


  Voiding Method Indwelling Catheter  Indwelling Catheter


 


  # Bowel Movements   300














- Exam





Physical exam


58-year-old male sitting up in a chair legs are elevated


Lungs adequate air movement sats are documented 95% on room air able to use 

events incentive spirometer achieving 1500


Heart S1-S2 audible and regular monitor denying chest pain


Abdomen abdominal binder on ostomy moderate amount of brown liquid stool 

pervena wound system in place Few Hypoactive bowel tones denies nausea vomiting 

surgical dressing site dry surgical tenderness appropriate nondistended 

indwelling Londono catheter in place


Extremities  edema noted to lower extremities persist


Scrotum decrease scrotal edema noted





- Labs


CBC & Chem 7: 


 06/28/18 07:38





 06/28/18 07:38


Labs: 


 Abnormal Lab Results - Last 24 Hours (Table)











  06/27/18 06/27/18 06/27/18 Range/Units





  07:03 12:22 17:57 


 


RBC     (4.30-5.90)  m/uL


 


Hgb     (13.0-17.5)  gm/dL


 


Hct     (39.0-53.0)  %


 


Lymphocytes #     (1.0-4.8)  k/uL


 


Sodium  134 L    (137-145)  mmol/L


 


Carbon Dioxide  21 L    (22-30)  mmol/L


 


BUN     (9-20)  mg/dL


 


Glucose  116 H    (74-99)  mg/dL


 


POC Glucose (mg/dL)   113 H  167 H  (75-99)  mg/dL


 


Calcium  7.9 L    (8.4-10.2)  mg/dL














  06/28/18 06/28/18 06/28/18 Range/Units





  00:36 07:38 07:38 


 


RBC   3.61 L   (4.30-5.90)  m/uL


 


Hgb   10.9 L   (13.0-17.5)  gm/dL


 


Hct   33.4 L   (39.0-53.0)  %


 


Lymphocytes #   0.6 L   (1.0-4.8)  k/uL


 


Sodium    133 L  (137-145)  mmol/L


 


Carbon Dioxide     (22-30)  mmol/L


 


BUN    6 L  (9-20)  mg/dL


 


Glucose     (74-99)  mg/dL


 


POC Glucose (mg/dL)  109 H    (75-99)  mg/dL


 


Calcium    7.6 L  (8.4-10.2)  mg/dL














Assessment and Plan


Assessment: 





Impression


Present on admission abdominal pain nausea vomiting suspect due to partial 

bowel obstruction involving small and large bowel


CAT scan abdomen and pelvis obtained on admission report indicates mild acute 

diverticulitis mid to distal sigmoid colon causing partial small bowel 

obstruction involving small large bowel


Known coronary artery disease with prior coronary stenting done October 2017 on 

dual antiplatelet therapy Plavix and aspirin


History of tobacco abuse recently quit


A recent colonoscopy to the sigmoid colon with flexible sigmoidoscopy done 

February 2018 showed scattered diverticulosis of the sigmoid colon with severe 

torturous sigmoid colon with no evidence of an active bleed as part of a workup 

for acute blood loss anemia


A recent EGD February 2018 no evidence of an active duodenitis, no duodenal 

ulcer: Gastric ulcer


Abdominal x-ray done on the 18th findings are suggestive of severe ileus 


CAT scan abdomen and pelvis:colon Collapse with severely dilated small bowel


History of chronic daily alcohol use with no evidence of impending withdrawals


Status post June 25 diagnostic laparoscopic converted to open, small bowel 

decompression, sigmoid colectomy involving the large proximal sigmoid tumor, 

descending ostomy creation Lacy's procedure with peritoneal lavage 1 L, 

application of prevena wound system


Febrile persist











Plan


Obtain stool sent for C. diff


Repeat a CBC at 6:00 tonight call results to Dr. Desai


Infectious disease consult recommendations antibiotic


Continue postop surgical care


Ostomy teaching with appropriate


Anticipate Will need prolonged antibiotics


Monitor labs


DVT and GI prophylaxis


Pain control


Follow-up on path report currently pending


 


Further surgical recommendations pending will follow with you











Progress note dictated for Dr. Desai 








The above impression and plan of care have been discussed and directed by 

signing physician. Marlee Simon nurse practitioner acting as scribe for signing 

physician.





<Eliz Desai N - Last Filed: 06/30/18 09:34>





Objective





- Vital Signs


Vital signs: 


 Vital Signs











Temp  97.8 F   06/30/18 06:34


 


Pulse  91   06/30/18 06:34


 


Resp  18   06/30/18 06:34


 


BP  131/78   06/30/18 06:34


 


Pulse Ox  97   06/30/18 07:26








 Intake & Output











 06/29/18 06/30/18 06/30/18





 18:59 06:59 18:59


 


Intake Total 600 900 


 


Output Total 225 2250 


 


Balance 375 -1350 


 


Intake:   


 


  Oral 600 900 


 


Output:   


 


  Urine 100 1050 


 


  Post Void Residual 125  


 


  Stool  1200 


 


Other:   


 


  Voiding Method Indwelling Catheter Indwelling Catheter 


 


  # Voids 1 1 


 


  # Bowel Movements  1 














- Labs


CBC & Chem 7: 


 06/29/18 07:27





 06/30/18 05:05


Labs: 


 Abnormal Lab Results - Last 24 Hours (Table)











  06/29/18 06/30/18 Range/Units





  22:19 05:05 


 


Sodium   136 L  (137-145)  mmol/L


 


Potassium  3.2 L   (3.5-5.1)  mmol/L


 


BUN   4 L  (9-20)  mg/dL


 


Calcium   7.8 L  (8.4-10.2)  mg/dL


 


Total Bilirubin   0.1 L  (0.2-1.3)  mg/dL


 


AST   64 H  (17-59)  U/L


 


Total Protein   4.7 L  (6.3-8.2)  g/dL


 


Albumin   2.2 L  (3.5-5.0)  g/dL








 Microbiology - Last 24 Hours (Table)











 06/28/18 14:18 Blood Culture Gram Stain - Preliminary





 Blood 


 


 06/28/18 14:18 Blood Culture - Final





 Blood 


 


 06/28/18 14:15 Blood Culture - Final





 Blood 


 


 06/28/18 14:00 Urine Culture - Final





 Urine,Catheterized 














Assessment and Plan


(1) Partial bowel obstruction


Current Visit: Yes   Status: Acute   Code(s): K56.600 - PARTIAL INTESTINAL 

OBSTRUCTION, UNSPECIFIED AS TO CAUSE   SNOMED Code(s): 99944823


   





(2) Intractable left lower quadrant abdominal pain


Current Visit: Yes   Status: Acute   Code(s): R10.32 - LEFT LOWER QUADRANT PAIN

   SNOMED Code(s): 631016820


   





(3) Neoplasm of sigmoid colon


Current Visit: Yes   Status: Acute   Code(s): D49.0 - NEOPLASM OF UNSPECIFIED 

BEHAVIOR OF DIGESTIVE SYSTEM   SNOMED Code(s): 030716406


   





(4) Sigmoid volvulus


Current Visit: Yes   Status: Acute   Code(s): K56.2 - VOLVULUS   SNOMED Code(s)

: 587026939


   





(5) Large bowel obstruction


Current Visit: Yes   Status: Acute   Code(s): K56.609 - UNSP INTESTNL OBST, 

UNSP AS TO PARTIAL VERSUS COMPLETE OBST   SNOMED Code(s): 305554294


   





(6) Sigmoid diverticulosis


Current Visit: Yes   Status: Acute   Code(s): K57.30 - DVRTCLOS OF LG INT W/O 

PERFORATION OR ABSCESS W/O BLEEDING   SNOMED Code(s): 048265206


   





(7) Ischemic cardiomyopathy


Current Visit: Yes   Status: Acute   Code(s): I25.5 - ISCHEMIC CARDIOMYOPATHY   

SNOMED Code(s): 691153014


   





(8) History of cardiac catheterization


Current Visit: Yes   Status: Acute   Code(s): Z98.890 - OTHER SPECIFIED 

POSTPROCEDURAL STATES   SNOMED Code(s): 84188837904060

## 2018-06-29 LAB
ALBUMIN SERPL-MCNC: 2.2 G/DL (ref 3.5–5)
ALP SERPL-CCNC: 117 U/L (ref 38–126)
ALT SERPL-CCNC: 42 U/L (ref 21–72)
ANION GAP SERPL CALC-SCNC: 10 MMOL/L
AST SERPL-CCNC: 75 U/L (ref 17–59)
BASOPHILS # BLD AUTO: 0 K/UL (ref 0–0.2)
BASOPHILS NFR BLD AUTO: 0 %
BUN SERPL-SCNC: 5 MG/DL (ref 9–20)
CALCIUM SPEC-MCNC: 7.7 MG/DL (ref 8.4–10.2)
CHLORIDE SERPL-SCNC: 104 MMOL/L (ref 98–107)
CO2 SERPL-SCNC: 23 MMOL/L (ref 22–30)
EOSINOPHIL # BLD AUTO: 0 K/UL (ref 0–0.7)
EOSINOPHIL NFR BLD AUTO: 0 %
ERYTHROCYTE [DISTWIDTH] IN BLOOD BY AUTOMATED COUNT: 3.56 M/UL (ref 4.3–5.9)
ERYTHROCYTE [DISTWIDTH] IN BLOOD: 14.2 % (ref 11.5–15.5)
GLUCOSE SERPL-MCNC: 85 MG/DL (ref 74–99)
HCT VFR BLD AUTO: 32.5 % (ref 39–53)
HGB BLD-MCNC: 10.7 GM/DL (ref 13–17.5)
LYMPHOCYTES # SPEC AUTO: 0.6 K/UL (ref 1–4.8)
LYMPHOCYTES NFR SPEC AUTO: 12 %
MCH RBC QN AUTO: 29.9 PG (ref 25–35)
MCHC RBC AUTO-ENTMCNC: 32.8 G/DL (ref 31–37)
MCV RBC AUTO: 91.2 FL (ref 80–100)
MONOCYTES # BLD AUTO: 0.5 K/UL (ref 0–1)
MONOCYTES NFR BLD AUTO: 9 %
NEUTROPHILS # BLD AUTO: 4 K/UL (ref 1.3–7.7)
NEUTROPHILS NFR BLD AUTO: 75 %
PLATELET # BLD AUTO: 327 K/UL (ref 150–450)
POTASSIUM SERPL-SCNC: 3.7 MMOL/L (ref 3.5–5.1)
PROT SERPL-MCNC: 4.7 G/DL (ref 6.3–8.2)
SODIUM SERPL-SCNC: 137 MMOL/L (ref 137–145)
WBC # BLD AUTO: 5.3 K/UL (ref 3.8–10.6)

## 2018-06-29 RX ADMIN — BENZOCAINE AND MENTHOL PRN EACH: 15; 3.6 LOZENGE ORAL at 20:32

## 2018-06-29 RX ADMIN — METOPROLOL TARTRATE SCH MG: 25 TABLET, FILM COATED ORAL at 09:04

## 2018-06-29 RX ADMIN — METRONIDAZOLE SCH MLS/HR: 500 INJECTION, SOLUTION INTRAVENOUS at 12:14

## 2018-06-29 RX ADMIN — DEXTROSE MONOHYDRATE SCH MLS/HR: 5 INJECTION, SOLUTION INTRAVENOUS at 09:15

## 2018-06-29 RX ADMIN — ALVIMOPAN SCH MG: 12 CAPSULE ORAL at 09:04

## 2018-06-29 RX ADMIN — METRONIDAZOLE SCH MLS/HR: 500 INJECTION, SOLUTION INTRAVENOUS at 17:44

## 2018-06-29 RX ADMIN — TAMSULOSIN HYDROCHLORIDE SCH MG: 0.4 CAPSULE ORAL at 09:04

## 2018-06-29 RX ADMIN — ASPIRIN 81 MG CHEWABLE TABLET SCH MG: 81 TABLET CHEWABLE at 09:04

## 2018-06-29 RX ADMIN — DEXTROSE MONOHYDRATE SCH MLS/HR: 5 INJECTION, SOLUTION INTRAVENOUS at 01:04

## 2018-06-29 RX ADMIN — HYDROMORPHONE HYDROCHLORIDE PRN MG: 1 INJECTION, SOLUTION INTRAMUSCULAR; INTRAVENOUS; SUBCUTANEOUS at 09:05

## 2018-06-29 RX ADMIN — DEXTROSE MONOHYDRATE SCH MLS/HR: 5 INJECTION, SOLUTION INTRAVENOUS at 17:44

## 2018-06-29 RX ADMIN — METRONIDAZOLE SCH MLS/HR: 500 INJECTION, SOLUTION INTRAVENOUS at 05:58

## 2018-06-29 RX ADMIN — METRONIDAZOLE SCH MLS/HR: 500 INJECTION, SOLUTION INTRAVENOUS at 23:43

## 2018-06-29 RX ADMIN — ATORVASTATIN CALCIUM SCH MG: 80 TABLET, FILM COATED ORAL at 09:04

## 2018-06-29 RX ADMIN — ALVIMOPAN SCH MG: 12 CAPSULE ORAL at 20:31

## 2018-06-29 RX ADMIN — HEPARIN SODIUM SCH UNIT: 5000 INJECTION, SOLUTION INTRAVENOUS; SUBCUTANEOUS at 20:31

## 2018-06-29 RX ADMIN — HYDROMORPHONE HYDROCHLORIDE PRN MG: 1 INJECTION, SOLUTION INTRAMUSCULAR; INTRAVENOUS; SUBCUTANEOUS at 21:21

## 2018-06-29 RX ADMIN — PANTOPRAZOLE SODIUM SCH MG: 40 TABLET, DELAYED RELEASE ORAL at 09:04

## 2018-06-29 RX ADMIN — HYDROMORPHONE HYDROCHLORIDE PRN MG: 1 INJECTION, SOLUTION INTRAMUSCULAR; INTRAVENOUS; SUBCUTANEOUS at 13:19

## 2018-06-29 RX ADMIN — HEPARIN SODIUM SCH UNIT: 5000 INJECTION, SOLUTION INTRAVENOUS; SUBCUTANEOUS at 09:05

## 2018-06-29 RX ADMIN — METOPROLOL TARTRATE SCH MG: 25 TABLET, FILM COATED ORAL at 20:32

## 2018-06-29 NOTE — P.PN
Subjective


Progress Note Date: 06/29/18


Progress note  being dictated for Dr. Lopez














Interval history: This is a 58-year-old gentleman admitted with partial  bowel 

obstruction involving both small and large bowel and multiple other medical 

issues.  NG tube and Londono catheter discontinued yesterday.  Reports multiple 

bowel movements, yet abdomen remains distended.  Denies abdominal tenderness.  

Follow-up Abdominal x-ray reporting continued marked diffuse small bowel 

dilatation, prominent air throughout the right;, suggestive of severe ileus 

rather than small bowel obstruction and no free intraperitoneal air below the 

hemidiaphragms .complains of shortness of breath, suspect related to abdominal 

distention, chest x-ray ordered. Voiding without difficulty.  Complains of 

nausea, on clear liquid diet as per surgery.  Afebrile.  Blood cultures 

negative.











06/19/2018 increased abdominal pain during the night and NG tube reinserted as 

per surgery. Abdomen remains firm and distended, with 450 MLS dark brownish 

drainage over the last 8 hours.positive fluctuating abdominal pain.Diagnostic 

laparoscopic lysis of adhesions recommend per surgery.  Aspirin, Plavix placed 

on hold, maintained on Lovenox.  PPN nitiated.  Maintained on IV fluid 

hydration. Cardiology consulted regarding anticoagulation recommendations/ 

surgery.














06/20/2018 receiving IV fluid hydration. PPN unavailable from pharmacy, TPN 

ordered as per surgery.  Scheduled for PICC line placement. Patient ambulating 

to and from bathroom, complaining of exertional dyspnea.  Chest x-ray reporting 

possible early pneumonia right lung base, mild atelectasis. Minimal improvement 

in abdominal distention.  Reports mild improvement in abdominal pain.  NG  

drainage of around 800ml /8hrs. Telemetry reporting sinus rhythm to sinus tach, 

low 100s.  Plavix and aspirin remain on hold for upcoming surgery, Saturday.  

Afebrile








6/21/18 NG tube "fell out last night".  Minimal improvement in abdominal pain, 

abdominal distention.  Mild nausea, no bowel movement.  No emesis.  Awaiting 

PICC line placement.  Afebrile, maintaining O2 sats of 95-97% on room air.





Review of systems:





CONSTITUTIONAL: No fever, positive fatigue. 


HEENT: No recent visual problems or hearing problems. Denied any sore throat. 


CARDIOVASCULAR: No chest pain,  no palpitations, no syncope. 


PULMONARY: Exertional shortness of breath, no cough, no hemoptysis. 


GASTROINTESTINAL: No diarrhea, positive nausea, no vomiting, positive abdominal 

pain. 


NEUROLOGICAL: No headaches, generalized weakness, no numbness. 


HEMATOLOGICAL: Denies any bleeding or petechiae. 


GENITOURINARY: Denies any burning micturition, frequency, or urgency.  


MUSCULOSKELETAL/RHEUMATOLOGICAL: Denies any joint pain, swelling, or any muscle 

pain. 


ENDOCRINE: Denies any polyuria or polydipsia.


PSYCHIATRIC: No anxiety, no depression





The rest of the 14 point review of systems is negative

















Active Medications





Hydrocodone Bitart/Acetaminophen (Norco 7.5-325)  1 each PO Q6H PRN


   PRN Reason: Pain


   Last Admin: 06/19/18 09:09 Dose:  1 each


Atorvastatin Calcium (Lipitor)  80 mg PO QAM ISA


   Last Admin: 06/21/18 07:27 Dose:  80 mg


Benzocaine/Menthol (Cepacol Lozenge)  1 each MUCOUS MEM Q4HR PRN


   PRN Reason: Sore Throat


   Last Admin: 06/16/18 10:28 Dose:  1 each


Enoxaparin Sodium (Lovenox)  30 mg SQ Q12HR [facility]


   Last Admin: 06/21/18 21:51 Dose:  Not Given


Famotidine (Pepcid)  20 mg IV Q12HR ISA


   Last Admin: 06/21/18 21:52 Dose:  20 mg


Hydromorphone HCl (Dilaudid)  1 mg IVP Q4HR PRN


   PRN Reason: Pain


   Last Admin: 06/21/18 21:45 Dose:  1 mg


Levofloxacin 750 mg/ IV (Solution)  150 mls @ 100 mls/hr IVPB Q24H [facility]


   Last Admin: 06/21/18 07:27 Dose:  100 mls/hr


Metronidazole 500 mg/ IV (Solution)  100 mls @ 100 mls/hr IVPB Q6HR [facility]


   Last Admin: 06/21/18 17:21 Dose:  100 mls/hr


Sodium Chloride (Saline 0.9%)  1,000 mls @ 125 mls/hr IV .Q8H [facility]


   Last Admin: 06/21/18 21:52 Dose:  125 mls/hr


Parenteral Vitamin Supplement 10 ml/ Chromium/Copper/Manganese/Seleni/Zn 1 ml/

Total Parenteral Nutrition  1,551 mls @ 64 mls/hr IV .Q24H [facility]


   Last Admin: 06/21/18 13:22 Dose:  64 mls/hr


Dextrose/Sodium Chloride (Dextrose 5%-Ns Iv Soln)  1,000 mls @ 20 mls/hr IV 

.Q24H [facility]


   Last Admin: 06/21/18 07:26 Dose:  Not Given


Lisinopril (Zestril)  5 mg PO DAILY [facility]


   Last Admin: 06/21/18 07:27 Dose:  5 mg


Lorazepam (Ativan)  1 mg IV Q2HR PRN


   PRN Reason: CIWA 8 or 9


   Last Admin: 06/19/18 06:13 Dose:  1 mg


Lorazepam (Ativan)  1 mg IV Q1HR PRN


   PRN Reason: CIWA 10 to 15


Metoclopramide HCl (Reglan)  5 mg IVP Q6HR PRN


   PRN Reason: Nausea And Vomiting


   Last Admin: 06/20/18 19:45 Dose:  5 mg


Metoprolol Tartrate (Lopressor)  25 mg PO BID [facility]


   Last Admin: 06/21/18 21:50 Dose:  25 mg


Miscellaneous Information (Potassium Per Protocol)  1 each MISCELLANE DAILY PRN

; Protocol


   PRN Reason: Per Protocol


Naloxone HCl (Narcan)  0.2 mg IV Q2M PRN


   PRN Reason: Opioid Reversal








06/22/2018 scheduled for surgery tomorrow.  Abdominal distention mildly improved

, no bowel movements.  Reports improvement in abdominal pain/cramping.  

Receiving TPN via PICC line.  Afebrile.  Denies chest pain, palpitations or 

shortness of breath.














06/25/2018 reports a couple bowel movements both last night and this morning.  

Currently abdominal pain controlled.  Denies chest pain, palpitations.  

Afebrile.








06/26/2018 status post sigmoid colectomy involving large proximal sigmoid tumor

, descending colostomy, Galvez's procedure, postop day #1.  Maintained on TPN, 

clear liquids.  No nausea or vomiting.  Patient reports much less pain, 

currently maintained on epidural.  Wound VAC present.








6/27/18 Edematous. Difficulty voiding, required staight cath. during the night. 

Pain controlled with epidural. Minimal intake of liquid diet.Denies nausea and 

vomiting.Functioning ostomy.HGB 11.7.








06/28/2018   T-max 102, infectious disease consulted. Significant edema,

persists.  TPN discontinued yesterday, diet advanced today to soft, consuming 50

%.  Functioning ostomy, copious stool, tested negative for C. difficile.  

Epidural has just been discontinued.  Hemoglobin 10.9.  IS 1500.








6/29/18 tolerating diet with no nausea or vomiting.  Reevaluated by cardiology 

regarding anticoagulation in a patient with recent stenting last fall, with 

aspirin resumed, Plavix discontinued.  Evaluated by infectious disease with 

recommendations noted.  Maintained on Flagyl and Zosyn.  Remains febrile, T-max 

100.6.  Amylase in the krishnamurthy with physical therapy, tolerated exertion well.  

Pathology report reported no evidence of malignancy.
































  












































Objective





- Vital Signs


Vital signs: 


 Vital Signs











Temp  100.0 F H  06/29/18 06:20


 


Pulse  108 H  06/29/18 06:20


 


Resp  16   06/29/18 06:20


 


BP  151/87   06/29/18 06:20


 


Pulse Ox  96   06/29/18 07:18








 Intake & Output











 06/28/18 06/29/18 06/29/18





 18:59 06:59 18:59


 


Intake Total 1800 300 


 


Output Total 6100 2025 


 


Balance -4300 -1725 


 


Weight 74 kg 74 kg 


 


Intake:   


 


  Oral 1800 300 


 


Output:   


 


  Urine 4650 2025 


 


    Straight 850  


 


  Stool 1450  


 


Other:   


 


  Voiding Method Indwelling Catheter Indwelling Catheter 


 


  # Voids 300 1 


 


  # Bowel Movements 300  














- Exam


PHYSICAL EXAM:


VITAL SIGNS: [As above]


GENERAL: Sitting up in chair, no acute distress


HEENT:  Conjunctivae normal. eyes normal.  Oral mucosa moist. 


NECK:  No JVD. No thyroid enlargement. No LNs


CARDIOVASCULAR:  S1, S2 muffled. No murmur


RESPIRATION: Breath sounds diminished in the bases. No rhonchi or crackles.  No 

wheezing


ABDOMEN:  less Firm, mildly distended, status post surgery, colostomy with 

drainage, abdominal binder,hypoactive bowel sounds. Prevena system present.  

Londono catheter present.


LEGS: Positive edema, scrotal edema-improving


PSYCHIATRY: Alert and oriented -3, mood and affect normal.


NERVOUS SYSTEM:  Cranial N 2-12 grossly normal. Moves all 4 limbs.  Diffuse 

weakness No focal deficits. 














- Labs


CBC & Chem 7: 


 06/29/18 07:27





 06/29/18 07:27


Labs: 


 Abnormal Lab Results - Last 24 Hours (Table)











  06/28/18 06/28/18 06/28/18 Range/Units





  07:38 12:21 18:29 


 


RBC    3.75 L  (4.30-5.90)  m/uL


 


Hgb    11.3 L  (13.0-17.5)  gm/dL


 


Hct    33.9 L  (39.0-53.0)  %


 


Lymphocytes #    0.8 L  (1.0-4.8)  k/uL


 


Sodium  133 L    (137-145)  mmol/L


 


BUN  6 L    (9-20)  mg/dL


 


POC Glucose (mg/dL)   100 H   (75-99)  mg/dL


 


Calcium  7.6 L    (8.4-10.2)  mg/dL


 


AST     (17-59)  U/L


 


Total Protein     (6.3-8.2)  g/dL


 


Albumin     (3.5-5.0)  g/dL














  06/29/18 06/29/18 Range/Units





  07:27 07:27 


 


RBC   3.56 L  (4.30-5.90)  m/uL


 


Hgb   10.7 L  (13.0-17.5)  gm/dL


 


Hct   32.5 L  (39.0-53.0)  %


 


Lymphocytes #   0.6 L  (1.0-4.8)  k/uL


 


Sodium    (137-145)  mmol/L


 


BUN  5 L   (9-20)  mg/dL


 


POC Glucose (mg/dL)    (75-99)  mg/dL


 


Calcium  7.7 L   (8.4-10.2)  mg/dL


 


AST  75 H   (17-59)  U/L


 


Total Protein  4.7 L   (6.3-8.2)  g/dL


 


Albumin  2.2 L   (3.5-5.0)  g/dL








 Microbiology - Last 24 Hours (Table)











 06/28/18 14:00 Urine Culture - Preliminary





 Urine,Catheterized 














Assessment and Plan


Assessment: 


1.  Acute bowel obstruction secondary to sigmoid mass possibly including 

colonic and small intestinal obstruction.status post sigmoid colectomy 

involving large proximal sigmoid tumor, descending colostomy, Galvez's 

procedure,.


2.  Possible acute mild diverticulitis


3.  Recent colonoscopy 2/2018 reporting scattered diverticulosis of sigmoid 

colon with severe tortuous sigmoid colon.


4.  CAD, stenting in October 2017 on dual antiplatelet therapy.  Reevaluated 

post surgery by cardiology with aspirin resumed.


5.  Chronic daily ETOH use


5.  Status post PICC line placement 








Plan: Continue current medication regime ,monitoring and symptomatic treatment.

  Antibiotics as per infectious disease.  Cultures pending.  Maintain Elevation 

scotum/leggs.  Aggressive pulmonary toileting with incentive spirometer 

reinforced.  Pain management as per surgery.  Discharge planning in progress 

for subacute rehab.,  pending resolution of fevers and final culture results 

.Prognosis guarded given multiple complex medical issues.














The impression and plan of care has been dictated as directed.





:


I performed a history and examination of this patient,  discussed the same with 

the dictator.  I agree with the dictator's note ,documented as a scribe.  Any 

additional findings or plans will be noted.

## 2018-06-29 NOTE — P.PN
<Areli Simondanielle CARRERA - Last Filed: 06/29/18 11:05>





Subjective


Progress Note Date: 06/29/18


58-year-old male seen and examined.  Patient sitting up in a chair legs were 

elevated.  States is tolerating a diet no nausea no vomiting.  Ostomy 

functioning with liquid stool in the ostomy bag Prevena wound system in place 

surgical dressing site dry indwelling Londono catheter in place


White count 5.3 hemoglobin 10.7   did note cardiology's recommendations who 

indicates Plavix can be discontinued indefinitely as his angioplasty his 

greater than 6 months does not need to be restarted continue Lopressor  

atovastatin continue aspirin 81 mg daily


Temp this morning 100 white count 5.3 with hemoglobin 10.7








Status post June 25th diagnostic arthroscopic converted to open small bowel 

decompression sigmoid colectomy involving a large proximal sigmoid tumor, 

descending colostomy creation Lacy's procedure with application of prevena 

wound system








Objective





- Vital Signs


Vital signs: 


 Vital Signs











Temp  100.0 F H  06/29/18 06:20


 


Pulse  108 H  06/29/18 06:20


 


Resp  16   06/29/18 06:20


 


BP  151/87   06/29/18 06:20


 


Pulse Ox  96   06/29/18 07:18








 Intake & Output











 06/28/18 06/29/18 06/29/18





 18:59 06:59 18:59


 


Intake Total 1800 300 


 


Output Total 6100 2025 


 


Balance -4300 -1725 


 


Weight 74 kg 74 kg 


 


Intake:   


 


  Oral 1800 300 


 


Output:   


 


  Urine 4650 2025 


 


    Straight 850  


 


  Stool 1450  


 


Other:   


 


  Voiding Method Indwelling Catheter Indwelling Catheter 


 


  # Voids 300 1 


 


  # Bowel Movements 300  














- Exam





Physical exam


58-year-old male sitting up in a chair legs are elevated


Lungs adequate air movement sats are documented 95% on room air able to use 

events incentive spirometer achieving 1500


Heart S1-S2 audible and regular monitor denying chest pain


Abdomen abdominal binder on ostomy moderate amount of brown liquid stool 

pervena wound system in place Few Hypoactive bowel tones denies nausea vomiting 

surgical dressing site dry surgical tenderness appropriate nondistended 

indwelling Londono catheter in place tolerating a full diet tolerating 


Extremities  edema noted to lower extremities persist


Scrotum decrease scrotal edema noted





- Labs


CBC & Chem 7: 


 06/29/18 07:27





 06/29/18 07:27


Labs: 


 Abnormal Lab Results - Last 24 Hours (Table)











  06/28/18 06/28/18 06/29/18 Range/Units





  12:21 18:29 07:27 


 


RBC   3.75 L   (4.30-5.90)  m/uL


 


Hgb   11.3 L   (13.0-17.5)  gm/dL


 


Hct   33.9 L   (39.0-53.0)  %


 


Lymphocytes #   0.8 L   (1.0-4.8)  k/uL


 


BUN    5 L  (9-20)  mg/dL


 


POC Glucose (mg/dL)  100 H    (75-99)  mg/dL


 


Calcium    7.7 L  (8.4-10.2)  mg/dL


 


AST    75 H  (17-59)  U/L


 


Total Protein    4.7 L  (6.3-8.2)  g/dL


 


Albumin    2.2 L  (3.5-5.0)  g/dL














  06/29/18 Range/Units





  07:27 


 


RBC  3.56 L  (4.30-5.90)  m/uL


 


Hgb  10.7 L  (13.0-17.5)  gm/dL


 


Hct  32.5 L  (39.0-53.0)  %


 


Lymphocytes #  0.6 L  (1.0-4.8)  k/uL


 


BUN   (9-20)  mg/dL


 


POC Glucose (mg/dL)   (75-99)  mg/dL


 


Calcium   (8.4-10.2)  mg/dL


 


AST   (17-59)  U/L


 


Total Protein   (6.3-8.2)  g/dL


 


Albumin   (3.5-5.0)  g/dL








 Microbiology - Last 24 Hours (Table)











 06/28/18 14:00 Urine Culture - Preliminary





 Urine,Catheterized 














Assessment and Plan


Assessment: 





Impression


Present on admission abdominal pain nausea vomiting suspect due to partial 

bowel obstruction involving small and large bowel


CAT scan abdomen and pelvis obtained on admission report indicates mild acute 

diverticulitis mid to distal sigmoid colon causing partial small bowel 

obstruction involving small large bowel


Known coronary artery disease with prior coronary stenting done October 2017 on 

dual antiplatelet therapy Plavix and aspirin


History of tobacco abuse recently quit


A recent colonoscopy to the sigmoid colon with flexible sigmoidoscopy done 

February 2018 showed scattered diverticulosis of the sigmoid colon with severe 

torturous sigmoid colon with no evidence of an active bleed as part of a workup 

for acute blood loss anemia


A recent EGD February 2018 no evidence of an active duodenitis, no duodenal 

ulcer: Gastric ulcer


Abdominal x-ray done on the 18th findings are suggestive of severe ileus 


CAT scan abdomen and pelvis:colon Collapse with severely dilated small bowel


History of chronic daily alcohol use with no evidence of impending withdrawals


Status post June 25 diagnostic laparoscopic converted to open, small bowel 

decompression, sigmoid colectomy involving the large proximal sigmoid tumor, 

descending ostomy creation Lacy's procedure with peritoneal lavage 1 L, 

application of prevena wound system


Febrile persist


Pathology report from the sigmoid showed no evidence of a malignancy








Plan


Remove indwelling Londono catheter now


Remove prevena wound system now


Discharge plan and progress anticipate subacute rehab


Infectious disease consult recommendations antibiotic


Continue postop surgical care


Ostomy teaching with appropriate


Anticipate Will need prolonged antibiotics


Monitor labs


DVT and GI prophylaxis


Pain control


Path report discussed with wife and patient at bedside


 


Further surgical recommendations pending will follow with you











Progress note dictated for Dr. Desai 








The above impression and plan of care have been discussed and directed by 

signing physician. Marlee Simon nurse practitioner acting as scribe for signing 

physician.





<Eliz Desai N - Last Filed: 06/30/18 09:36>





Objective





- Vital Signs


Vital signs: 


 Vital Signs











Temp  97.8 F   06/30/18 06:34


 


Pulse  91   06/30/18 06:34


 


Resp  18   06/30/18 06:34


 


BP  131/78   06/30/18 06:34


 


Pulse Ox  97   06/30/18 07:26








 Intake & Output











 06/29/18 06/30/18 06/30/18





 18:59 06:59 18:59


 


Intake Total 600 900 


 


Output Total 225 2250 


 


Balance 375 -1350 


 


Intake:   


 


  Oral 600 900 


 


Output:   


 


  Urine 100 1050 


 


  Post Void Residual 125  


 


  Stool  1200 


 


Other:   


 


  Voiding Method Indwelling Catheter Indwelling Catheter 


 


  # Voids 1 1 


 


  # Bowel Movements  1 














- Labs


CBC & Chem 7: 


 06/29/18 07:27





 06/30/18 05:05


Labs: 


 Abnormal Lab Results - Last 24 Hours (Table)











  06/29/18 06/30/18 Range/Units





  22:19 05:05 


 


Sodium   136 L  (137-145)  mmol/L


 


Potassium  3.2 L   (3.5-5.1)  mmol/L


 


BUN   4 L  (9-20)  mg/dL


 


Calcium   7.8 L  (8.4-10.2)  mg/dL


 


Total Bilirubin   0.1 L  (0.2-1.3)  mg/dL


 


AST   64 H  (17-59)  U/L


 


Total Protein   4.7 L  (6.3-8.2)  g/dL


 


Albumin   2.2 L  (3.5-5.0)  g/dL








 Microbiology - Last 24 Hours (Table)











 06/28/18 14:18 Blood Culture Gram Stain - Preliminary





 Blood 


 


 06/28/18 14:18 Blood Culture - Final





 Blood 


 


 06/28/18 14:15 Blood Culture - Final





 Blood 


 


 06/28/18 14:00 Urine Culture - Final





 Urine,Catheterized 














Assessment and Plan


(1) Partial bowel obstruction


Current Visit: Yes   Status: Acute   Code(s): K56.600 - PARTIAL INTESTINAL 

OBSTRUCTION, UNSPECIFIED AS TO CAUSE   SNOMED Code(s): 56791450


   





(2) Intractable left lower quadrant abdominal pain


Current Visit: Yes   Status: Acute   Code(s): R10.32 - LEFT LOWER QUADRANT PAIN

   SNOMED Code(s): 596906729


   





(3) Neoplasm of sigmoid colon


Current Visit: Yes   Status: Acute   Code(s): D49.0 - NEOPLASM OF UNSPECIFIED 

BEHAVIOR OF DIGESTIVE SYSTEM   SNOMED Code(s): 815567979


   





(4) Sigmoid volvulus


Current Visit: Yes   Status: Acute   Code(s): K56.2 - VOLVULUS   SNOMED Code(s)

: 370175055


   





(5) Large bowel obstruction


Current Visit: Yes   Status: Acute   Code(s): K56.609 - UNSP INTESTNL OBST, 

UNSP AS TO PARTIAL VERSUS COMPLETE OBST   SNOMED Code(s): 601152072


   





(6) Sigmoid diverticulosis


Current Visit: Yes   Status: Acute   Code(s): K57.30 - DVRTCLOS OF LG INT W/O 

PERFORATION OR ABSCESS W/O BLEEDING   SNOMED Code(s): 676083570


   





(7) Ischemic cardiomyopathy


Current Visit: Yes   Status: Acute   Code(s): I25.5 - ISCHEMIC CARDIOMYOPATHY   

SNOMED Code(s): 109517005


   





(8) History of cardiac catheterization


Current Visit: Yes   Status: Acute   Code(s): Z98.890 - OTHER SPECIFIED 

POSTPROCEDURAL STATES   SNOMED Code(s): 75142519251678


   


Plan: 





Patient pending discharge from infectious disease management. I was present for 

dressing change with China Esparza and nursing team. No cellulitis of the 

midline incision found. Optifoam dressing placed. Ostomy is viable. PICC line 

cultures pending which may also be source of infection.

## 2018-06-29 NOTE — PN
PROGRESS NOTE



DATE OF SERVICE:

06/29/2018



REASON FOR FOLLOWUP:

Fever, source likely abdominal with secondary peritonitis.



INTERVAL HISTORY:

The patient did have a low-grade fever this morning of 100; afebrile since then.  The

patient has been breathing comfortably.  Still has some abdominal pain, but no

worsening. No chest pain or cough.  No urinary symptoms.  Londono has been discontinued.



PHYSICAL EXAMINATION:

Blood pressure 151/87 with a pulse of 108, T-max of 100. He is 94% on room air.

General description is a middle-aged male lying in bed in no distress.

RESPIRATORY SYSTEM: Unlabored breathing. Clear to auscultation anteriorly.

HEART: S1, S2.  Regular rate and rhythm.

ABDOMEN: Soft. Mildly distended. No guarding or rigidity.

EXTREMITIES: No edema of the feet.



LABS:

Hemoglobin is 10.7, white count 5.3 with a BUN of 5, creatinine 0.74.  Blood culture

repeat has been negative so far.



DIAGNOSTIC IMPRESSION AND PLAN:

Patient with a fever with concern about possible abdominal source in a patient who did

have secondary peritonitis; patient with laparotomy with colostomy.  Currently covered

with Zosyn, which was started yesterday.  That will be continued while waiting for the

culture to finalize. Continue with supportive care.





MMODL / IJN: [id number] / Job#: [id number]

## 2018-06-29 NOTE — P.PN
Subjective





Mr. Franco is a pleasant 58-year-old  male past medical history 

significant for coronary artery disease with angioplasty x3 of the RCA 10/2017 

he also had moderate disease of the circumflex artery and diffuse disease to 

LAD. His most recent catheterization was performed in December 2017 and 

revealed patent stents to RCA and no progression of disease to LAD or 

circumflex. He has been maintained on aspirin, plavix, lopressor and 

atorvastatin. He has presented to the hospital with abdominal pain and 

distension. CT performed revealed evidence of acute sigmoid diverticulitis and 

concern for colonic neoplasm as well as dilitation of the small bowel 

suggesting ileus. He has undergone serial CT's for ongoing evaluation per 

surgery. Most CT performed this morning obtained imaging of the lung bases and 

reveals a small right pleural effusion has developed. We have been asked to see 

him secondary to history of CAD. Surgery has recommended GI rest and NG tube 

with ongoing evaluation. Currently Dr. Luther has held anti-platelets should 

he require surgery. He denies symptoms of chest pain, shortness of breath, 

diaphoresis, palpitations or dizziness. He states he hasn't had a bowel 

movement in 3 days and is starting to feel some movement occur although he has 

not yet passed any gas. He denies bleeding from the rectum or hematemesis. 





EKG reveals sinus tachycardia heart rate 129 with right bundle branch block. 

This is not new. Consistent with old EKG from February. 


Laboratory data reviewed, hemoglobin 13.2 down from 16.2 yesterday., platelets 

301, sodium 136, potassium 4.5, creatinine 0.76. 


Current cardiac medications include aspirin 81 mg daily, atorvastatin 80 mg 

daily, Plavix 75 mg daily and Lopressor 25 mg daily.  He also takes Prilosec 

and vitamin D.


Most recent echocardiogram reveals preserved left ventricular systolic function 

with ejection fraction 50-55%, mild aortic regurgitation, mild MR and mild TR.





6/20/2018


We have been asked to see Mr. Franco again during this admission for 

recommendations on plavix and aspirin prior to surgery. Aspirin and plavix had 

initially been resumed per surgery with no imminent plans for surgical 

intervention. However, his abdomen is not improving with NG tube decompression 

and therefore surgery has been recommended per Dr. Desai. He continues to c/

o abdominal pain and distention with mild improvement. Yesterday while up to 

the bathroom his heart rate went up to 157. He states he has been noticing mild 

shortness of breath with exertion in the last couple of days. He denies chest 

pain, dizziness, PND or orthopnea. He is laying flat in bed at the time of my 

exam. Chest xray obtained this morning reveals mild atelectasis or early 

pneumonia right lung base.  Blood pressure 164/105 heart rate 101 afebrile 

maintaining oxygen saturation on room air.  Laboratory data reviewed, 

hemoglobin 12.2, platelets 483, sodium 137, potassium 3.7, creatinine 0.5, 

magnesium 2.0.  He is currently taking atorvastatin 80 mg daily, Lovenox 30 mg 

twice a day, metoprolol 25 mg daily.  Aspirin and Plavix have been held.





6/29/2018


We have been asked to evaluate Mr. Franco second time during this admission 

regarding anticoagulation recommendations for discharge.  He has undergone 

diagnostic left breast be with laparoscopic lysis of adhesions, intraoperative 

EGD and attempted colonoscopy.  Findings were a small jayde bowel obstruction 

secondary to sigmoid tumor 6/23.  Subsequently after that on the 25th he 

underwent a laparoscopic converted to open small bowel decompression, sigmoid 

colectomy and descending colostomy creation.  He has been running low-grade 

temperatures and tachycardic.  Infectious disease is following.  Blood pressure 

151/87 heart rate 108 temperature 100F saturating appropriately on room air.  

He is currently being maintained on Lopressor 25 mg twice a day and 

atorvastatin 80 mg daily.  Lipitor data reviewed, hemoglobin 11.3, platelets 291

, sodium 133, potassium 3.8, creatinine 0.75.  His x-ray obtained yesterday 

reviewed.  No acute cardiopulmonary process noted.  He denies symptoms of chest 

pain, shortness of breath, dizziness or palpitations.  He also denies PND 

orthopnea.





Objective





- Vital Signs


Vital signs: 


 Vital Signs











Temp  100.0 F H  06/29/18 06:20


 


Pulse  108 H  06/29/18 06:20


 


Resp  16   06/29/18 06:20


 


BP  151/87   06/29/18 06:20


 


Pulse Ox  96   06/29/18 07:18








 Intake & Output











 06/28/18 06/29/18 06/29/18





 18:59 06:59 18:59


 


Intake Total 1800 300 


 


Output Total 6100 2025 


 


Balance -4300 -1725 


 


Weight 74 kg 74 kg 


 


Intake:   


 


  Oral 1800 300 


 


Output:   


 


  Urine 4650 2025 


 


    Straight 850  


 


  Stool 1450  


 


Other:   


 


  Voiding Method Indwelling Catheter Indwelling Catheter 


 


  # Voids 300 1 


 


  # Bowel Movements 300  














- Exam





GENERAL: Well-appearing, well-nourished and in no acute distress.


NECK: Supple without JVD or thyromegaly.


LUNGS: Breath sounds clear to auscultation bilaterally. Respiration equal and 

unlabored.  No wheezes, rales or rhonchi.  Diminished bilaterally.


HEART: Regular rate and rhythm without murmurs, rubs or gallops. S1 and S2 

heard.


EXTREMITIES: Normal range of motion, bilateral lower extremity nonpitting edema 

noted.  No clubbing or cyanosis. Peripheral pulses intact.





- Labs


CBC & Chem 7: 


 06/28/18 18:29





 06/28/18 07:38


Labs: 


 Abnormal Lab Results - Last 24 Hours (Table)











  06/28/18 06/28/18 06/28/18 Range/Units





  07:38 07:38 12:21 


 


RBC  3.61 L    (4.30-5.90)  m/uL


 


Hgb  10.9 L    (13.0-17.5)  gm/dL


 


Hct  33.4 L    (39.0-53.0)  %


 


Lymphocytes #  0.6 L    (1.0-4.8)  k/uL


 


Sodium   133 L   (137-145)  mmol/L


 


BUN   6 L   (9-20)  mg/dL


 


POC Glucose (mg/dL)    100 H  (75-99)  mg/dL


 


Calcium   7.6 L   (8.4-10.2)  mg/dL














  06/28/18 Range/Units





  18:29 


 


RBC  3.75 L  (4.30-5.90)  m/uL


 


Hgb  11.3 L  (13.0-17.5)  gm/dL


 


Hct  33.9 L  (39.0-53.0)  %


 


Lymphocytes #  0.8 L  (1.0-4.8)  k/uL


 


Sodium   (137-145)  mmol/L


 


BUN   (9-20)  mg/dL


 


POC Glucose (mg/dL)   (75-99)  mg/dL


 


Calcium   (8.4-10.2)  mg/dL








 Microbiology - Last 24 Hours (Table)











 06/28/18 14:00 Urine Culture - Preliminary





 Urine,Catheterized 














Assessment and Plan


Assessment: 





ASSESSMENT


1.  Ileus, bowel rest and decompression, status post colectomy.


2.  Sigmoid diverticulitis, receiving IV antibiotics


3.  History of known coronary artery disease status post angioplasty October 2017 currently on dual anti-platelet therapy 


4.  Former tobacco use, quit 10/2017


5.  Chronic daily alcohol use








PLAN


Plavix can be discontinued indefinitely as his angioplasty has been greater 

than 6 months ago.


Resume aspirin 81 mg daily.


Continue with Lopressor and atorvastatin as was previously ordered.


Ongoing medical management per surgery, infectious disease and primary care 

team.


Follow-up with Dr. AMRIK Sanford upon discharge.


 





Nurse Practitioner note has been reviewed, I agree with a documented findings 

and plan of care.  Patient was seen and examined.

## 2018-06-30 LAB
ALBUMIN SERPL-MCNC: 2.2 G/DL (ref 3.5–5)
ALP SERPL-CCNC: 108 U/L (ref 38–126)
ALT SERPL-CCNC: 44 U/L (ref 21–72)
ANION GAP SERPL CALC-SCNC: 8 MMOL/L
AST SERPL-CCNC: 64 U/L (ref 17–59)
BUN SERPL-SCNC: 4 MG/DL (ref 9–20)
CALCIUM SPEC-MCNC: 7.8 MG/DL (ref 8.4–10.2)
CHLORIDE SERPL-SCNC: 106 MMOL/L (ref 98–107)
CO2 SERPL-SCNC: 22 MMOL/L (ref 22–30)
GLUCOSE SERPL-MCNC: 87 MG/DL (ref 74–99)
MAGNESIUM SPEC-SCNC: 2 MG/DL (ref 1.6–2.3)
POTASSIUM SERPL-SCNC: 3.8 MMOL/L (ref 3.5–5.1)
PROT SERPL-MCNC: 4.7 G/DL (ref 6.3–8.2)
SODIUM SERPL-SCNC: 136 MMOL/L (ref 137–145)

## 2018-06-30 RX ADMIN — METOPROLOL TARTRATE SCH MG: 25 TABLET, FILM COATED ORAL at 08:29

## 2018-06-30 RX ADMIN — TAMSULOSIN HYDROCHLORIDE SCH MG: 0.4 CAPSULE ORAL at 08:29

## 2018-06-30 RX ADMIN — POTASSIUM CHLORIDE SCH MLS/HR: 7.46 INJECTION, SOLUTION INTRAVENOUS at 08:28

## 2018-06-30 RX ADMIN — HYDROMORPHONE HYDROCHLORIDE PRN MG: 1 INJECTION, SOLUTION INTRAMUSCULAR; INTRAVENOUS; SUBCUTANEOUS at 20:41

## 2018-06-30 RX ADMIN — POTASSIUM CHLORIDE SCH MEQ: 20 TABLET, EXTENDED RELEASE ORAL at 00:49

## 2018-06-30 RX ADMIN — DEXTROSE MONOHYDRATE SCH MLS/HR: 5 INJECTION, SOLUTION INTRAVENOUS at 16:21

## 2018-06-30 RX ADMIN — POTASSIUM CHLORIDE SCH MLS/HR: 7.46 INJECTION, SOLUTION INTRAVENOUS at 09:38

## 2018-06-30 RX ADMIN — DEXTROSE MONOHYDRATE SCH MLS/HR: 5 INJECTION, SOLUTION INTRAVENOUS at 08:34

## 2018-06-30 RX ADMIN — PANTOPRAZOLE SODIUM SCH MG: 40 TABLET, DELAYED RELEASE ORAL at 08:29

## 2018-06-30 RX ADMIN — ALVIMOPAN SCH MG: 12 CAPSULE ORAL at 21:03

## 2018-06-30 RX ADMIN — METRONIDAZOLE SCH MLS/HR: 500 INJECTION, SOLUTION INTRAVENOUS at 12:46

## 2018-06-30 RX ADMIN — METRONIDAZOLE SCH MLS/HR: 500 INJECTION, SOLUTION INTRAVENOUS at 06:22

## 2018-06-30 RX ADMIN — HYDROMORPHONE HYDROCHLORIDE PRN MG: 1 INJECTION, SOLUTION INTRAMUSCULAR; INTRAVENOUS; SUBCUTANEOUS at 13:52

## 2018-06-30 RX ADMIN — HYDROMORPHONE HYDROCHLORIDE PRN MG: 1 INJECTION, SOLUTION INTRAMUSCULAR; INTRAVENOUS; SUBCUTANEOUS at 04:32

## 2018-06-30 RX ADMIN — POTASSIUM CHLORIDE SCH MEQ: 20 TABLET, EXTENDED RELEASE ORAL at 01:55

## 2018-06-30 RX ADMIN — ONDANSETRON PRN MG: 2 INJECTION INTRAMUSCULAR; INTRAVENOUS at 20:41

## 2018-06-30 RX ADMIN — ASPIRIN 81 MG CHEWABLE TABLET SCH MG: 81 TABLET CHEWABLE at 08:29

## 2018-06-30 RX ADMIN — ATORVASTATIN CALCIUM SCH MG: 80 TABLET, FILM COATED ORAL at 08:29

## 2018-06-30 RX ADMIN — ALVIMOPAN SCH MG: 12 CAPSULE ORAL at 08:29

## 2018-06-30 RX ADMIN — HEPARIN SODIUM SCH UNIT: 5000 INJECTION, SOLUTION INTRAVENOUS; SUBCUTANEOUS at 21:03

## 2018-06-30 RX ADMIN — DEXTROSE MONOHYDRATE SCH MLS/HR: 5 INJECTION, SOLUTION INTRAVENOUS at 23:33

## 2018-06-30 RX ADMIN — DEXTROSE MONOHYDRATE SCH MLS/HR: 5 INJECTION, SOLUTION INTRAVENOUS at 00:50

## 2018-06-30 RX ADMIN — HEPARIN SODIUM SCH UNIT: 5000 INJECTION, SOLUTION INTRAVENOUS; SUBCUTANEOUS at 08:29

## 2018-06-30 RX ADMIN — METOPROLOL TARTRATE SCH MG: 25 TABLET, FILM COATED ORAL at 21:03

## 2018-06-30 NOTE — P.PN
Subjective


Progress Note Date: 06/30/18


Principal diagnosis: 





Galvez's procedure





Patient doing well today.  He is anxious to be transferred to rehab.  Denies 

significant pain.  He is afebrile.  No labs from today.  Ostomy is functioning.

  He is tolerating his diet.





Objective





- Vital Signs


Vital signs: 


 Vital Signs











Temp  97.8 F   06/30/18 06:34


 


Pulse  91   06/30/18 06:34


 


Resp  18   06/30/18 06:34


 


BP  131/78   06/30/18 06:34


 


Pulse Ox  97   06/30/18 07:26








 Intake & Output











 06/29/18 06/30/18 06/30/18





 18:59 06:59 18:59


 


Intake Total 600 900 


 


Output Total 225 2250 


 


Balance 375 -1350 


 


Intake:   


 


  Oral 600 900 


 


Output:   


 


  Urine 100 1050 


 


  Post Void Residual 125  


 


  Stool  1200 


 


Other:   


 


  Voiding Method Indwelling Catheter Indwelling Catheter Urinal


 


  # Voids 1 1 


 


  # Bowel Movements  1 














- Exam





Abdomen: Soft, nondistended, incision with multiple small open wounds draining 

a small amount of serosanguineous fluid, no erythema





- Labs


CBC & Chem 7: 


 06/29/18 07:27





 06/30/18 05:05


Labs: 


 Abnormal Lab Results - Last 24 Hours (Table)











  06/29/18 06/30/18 Range/Units





  22:19 05:05 


 


Sodium   136 L  (137-145)  mmol/L


 


Potassium  3.2 L   (3.5-5.1)  mmol/L


 


BUN   4 L  (9-20)  mg/dL


 


Calcium   7.8 L  (8.4-10.2)  mg/dL


 


Total Bilirubin   0.1 L  (0.2-1.3)  mg/dL


 


AST   64 H  (17-59)  U/L


 


Total Protein   4.7 L  (6.3-8.2)  g/dL


 


Albumin   2.2 L  (3.5-5.0)  g/dL








 Microbiology - Last 24 Hours (Table)











 06/28/18 14:15 Blood Culture Gram Stain - Preliminary





 Blood 


 


 06/28/18 14:18 Blood Culture Gram Stain - Preliminary





 Blood 


 


 06/28/18 14:18 Blood Culture - Final





 Blood 


 


 06/28/18 14:15 Blood Culture - Final





 Blood 


 


 06/28/18 14:00 Urine Culture - Final





 Urine,Catheterized 














Assessment and Plan


(1) Diverticulitis


Narrative/Plan: 


Continue diet as tolerated.  Stable for transfer to rehab from my standpoint.  

Await infectious disease opinion regarding transfer.


Current Visit: Yes   Status: Acute   Code(s): K57.92 - DVTRCLI OF INTEST, PART 

UNSP, W/O PERF OR ABSCESS W/O BLEED   SNOMED Code(s): 837683235

## 2018-06-30 NOTE — PN
PROGRESS NOTE



DATE OF SERVICE:

06/30/2018



PRESENTING COMPLAINT:

Bowel obstruction.



INTERVAL HISTORY:

This is a patient who presented with bowel obstruction, eventually was taken to the OR.

Based on the pathology report, it does appear that the patient probably had a sigmoid

diverticulosis with perforated diverticulitis and intra-abdominal swelling.  There was

lavage.  ID was consulted on 06/28/2018, who has the patient on IV antibiotics.  The

patient is tolerating some soft diet.  Colostomy is working.  Fevers are starting to

come down.  The patient is on IV antibiotics.  The wife is at the bedside.  The patient

is able to walk a bit.  Abdominal pain is better.



REVIEW OF SYSTEMS:

Done for constitutional, cardiovascular, GI, pulmonary; relevant findings as above.



CURRENT MEDICATIONS:

Include IV Zosyn.



EXAMINATION:

Temperature 99.3, pulse 89, respiration 17, blood pressure 136/80, pulse ox 97% on room

air.

GENERAL APPEARANCE:  Lying in bed, not in distress.

EYES:  Pupils equal.  Conjunctivae normal.

HEENT:  External nose and ears normal.  Oral cavity normal.

NECK:  JVD not raised.  Mass not palpable.

RESPIRATORY:  Effort normal.  Lungs are clear.

CARDIOVASCULAR:  First and second sounds normal.  No edema.

ABDOMEN:  Some tenderness.  Colostomy bag with stool in place.  Liver, spleen not

palpable.

PSYCHIATRY:  Alert and oriented x3.  Mood and affect were normal.



INVESTIGATIONS:

White count was 5.3.  Potassium 3.8, BUN 4, creatinine 0.70.  Albumin 2.2.



ASSESSMENT:

1. Possible acute sigmoid diverticulitis with perforation with secondary peritonitis

    from fecal soiling.

2. Chronic alcohol use.

3. Coronary artery disease with stent to the right coronary artery and diffuse disease

    to left anterior descending.

4. Acute renal failure prerenal prerenal/acute tubular necrosis, now resolved.

5. Hyperkalemia, improved.

6. Hypercalcemia from dehydration.

7. Hypoalbuminemia as an acute phase reactant.



PLAN:

I spoke to Dr. Arango of infectious disease and I agree that the patient should be

watched on the current antibiotics before he is allowed to go to the rehab.  The

patient's diet will be continued.  The patient to remain on IV Zosyn.  Care was

discussed with the patient and wife.





MMODL / IJN: [id number] / Job#: [id number]

## 2018-07-01 LAB
ALBUMIN SERPL-MCNC: 2.2 G/DL (ref 3.5–5)
ALP SERPL-CCNC: 102 U/L (ref 38–126)
ALT SERPL-CCNC: 42 U/L (ref 21–72)
ANION GAP SERPL CALC-SCNC: 10 MMOL/L
AST SERPL-CCNC: 49 U/L (ref 17–59)
BASOPHILS # BLD AUTO: 0 K/UL (ref 0–0.2)
BASOPHILS NFR BLD AUTO: 1 %
BUN SERPL-SCNC: 4 MG/DL (ref 9–20)
CALCIUM SPEC-MCNC: 7.9 MG/DL (ref 8.4–10.2)
CHLORIDE SERPL-SCNC: 105 MMOL/L (ref 98–107)
CO2 SERPL-SCNC: 22 MMOL/L (ref 22–30)
EOSINOPHIL # BLD AUTO: 0.1 K/UL (ref 0–0.7)
EOSINOPHIL NFR BLD AUTO: 1 %
ERYTHROCYTE [DISTWIDTH] IN BLOOD BY AUTOMATED COUNT: 3.74 M/UL (ref 4.3–5.9)
ERYTHROCYTE [DISTWIDTH] IN BLOOD: 14.4 % (ref 11.5–15.5)
GLUCOSE SERPL-MCNC: 76 MG/DL (ref 74–99)
HCT VFR BLD AUTO: 34.5 % (ref 39–53)
HGB BLD-MCNC: 11.2 GM/DL (ref 13–17.5)
LYMPHOCYTES # SPEC AUTO: 1.3 K/UL (ref 1–4.8)
LYMPHOCYTES NFR SPEC AUTO: 24 %
MCH RBC QN AUTO: 30 PG (ref 25–35)
MCHC RBC AUTO-ENTMCNC: 32.5 G/DL (ref 31–37)
MCV RBC AUTO: 92.2 FL (ref 80–100)
MONOCYTES # BLD AUTO: 0.6 K/UL (ref 0–1)
MONOCYTES NFR BLD AUTO: 11 %
NEUTROPHILS # BLD AUTO: 3.2 K/UL (ref 1.3–7.7)
NEUTROPHILS NFR BLD AUTO: 60 %
PLATELET # BLD AUTO: 354 K/UL (ref 150–450)
POTASSIUM SERPL-SCNC: 4.1 MMOL/L (ref 3.5–5.1)
PROT SERPL-MCNC: 4.7 G/DL (ref 6.3–8.2)
SODIUM SERPL-SCNC: 137 MMOL/L (ref 137–145)
WBC # BLD AUTO: 5.4 K/UL (ref 3.8–10.6)

## 2018-07-01 RX ADMIN — HYDROCODONE BITARTRATE AND ACETAMINOPHEN PRN EACH: 7.5; 325 TABLET ORAL at 23:29

## 2018-07-01 RX ADMIN — ASPIRIN 81 MG CHEWABLE TABLET SCH MG: 81 TABLET CHEWABLE at 07:25

## 2018-07-01 RX ADMIN — HYDROMORPHONE HYDROCHLORIDE PRN MG: 1 INJECTION, SOLUTION INTRAMUSCULAR; INTRAVENOUS; SUBCUTANEOUS at 04:58

## 2018-07-01 RX ADMIN — DEXTROSE MONOHYDRATE SCH MLS/HR: 5 INJECTION, SOLUTION INTRAVENOUS at 07:26

## 2018-07-01 RX ADMIN — METOPROLOL TARTRATE SCH MG: 25 TABLET, FILM COATED ORAL at 07:25

## 2018-07-01 RX ADMIN — ATORVASTATIN CALCIUM SCH MG: 80 TABLET, FILM COATED ORAL at 07:25

## 2018-07-01 RX ADMIN — HEPARIN SODIUM SCH UNIT: 5000 INJECTION, SOLUTION INTRAVENOUS; SUBCUTANEOUS at 22:57

## 2018-07-01 RX ADMIN — TAMSULOSIN HYDROCHLORIDE SCH MG: 0.4 CAPSULE ORAL at 07:25

## 2018-07-01 RX ADMIN — DEXTROSE MONOHYDRATE SCH MLS/HR: 5 INJECTION, SOLUTION INTRAVENOUS at 16:58

## 2018-07-01 RX ADMIN — FLUCONAZOLE, SODIUM CHLORIDE SCH MLS/HR: 2 INJECTION INTRAVENOUS at 22:57

## 2018-07-01 RX ADMIN — ONDANSETRON PRN MG: 2 INJECTION INTRAMUSCULAR; INTRAVENOUS at 15:36

## 2018-07-01 RX ADMIN — PANTOPRAZOLE SODIUM SCH MG: 40 TABLET, DELAYED RELEASE ORAL at 07:25

## 2018-07-01 RX ADMIN — METOPROLOL TARTRATE SCH MG: 25 TABLET, FILM COATED ORAL at 22:57

## 2018-07-01 RX ADMIN — HYDROMORPHONE HYDROCHLORIDE PRN MG: 1 INJECTION, SOLUTION INTRAMUSCULAR; INTRAVENOUS; SUBCUTANEOUS at 15:36

## 2018-07-01 RX ADMIN — ONDANSETRON PRN MG: 2 INJECTION INTRAMUSCULAR; INTRAVENOUS at 04:34

## 2018-07-01 RX ADMIN — ALVIMOPAN SCH MG: 12 CAPSULE ORAL at 07:26

## 2018-07-01 RX ADMIN — HEPARIN SODIUM SCH UNIT: 5000 INJECTION, SOLUTION INTRAVENOUS; SUBCUTANEOUS at 07:26

## 2018-07-01 RX ADMIN — ALVIMOPAN SCH MG: 12 CAPSULE ORAL at 22:57

## 2018-07-01 RX ADMIN — METOCLOPRAMIDE PRN MG: 5 INJECTION, SOLUTION INTRAMUSCULAR; INTRAVENOUS at 23:29

## 2018-07-01 NOTE — P.PN
Subjective


Progress Note Date: 07/01/18


Principal diagnosis: 





Galvez's procedure





Patient feels well.  No pain.  Tolerating diet.  Good ostomy function.  

Transfer was held yesterday as infectious disease prefers an additional few 

days of antibiotics.





Objective





- Vital Signs


Vital signs: 


 Vital Signs











Temp  97.1 F L  07/01/18 06:22


 


Pulse  86   07/01/18 06:22


 


Resp  18   07/01/18 06:22


 


BP  136/78   07/01/18 06:22


 


Pulse Ox  96   07/01/18 06:22








 Intake & Output











 06/30/18 07/01/18 07/01/18





 18:59 06:59 18:59


 


Intake Total  450 


 


Output Total 1000  


 


Balance -1000 450 


 


Intake:   


 


  Oral  450 


 


Output:   


 


  Urine 1000  


 


Other:   


 


  Voiding Method Urinal Toilet 


 


  # Voids  3 














- Exam





Abdomen: Soft, nondistended, wound with some serosanguineous drainage, ostomy 

functioning





- Labs


CBC & Chem 7: 


 07/01/18 07:03





 07/01/18 07:03


Labs: 


 Abnormal Lab Results - Last 24 Hours (Table)











  07/01/18 07/01/18 Range/Units





  07:03 07:03 


 


RBC   3.74 L  (4.30-5.90)  m/uL


 


Hgb   11.2 L  (13.0-17.5)  gm/dL


 


Hct   34.5 L  (39.0-53.0)  %


 


BUN  4 L   (9-20)  mg/dL


 


Creatinine  0.65 L   (0.66-1.25)  mg/dL


 


Calcium  7.9 L   (8.4-10.2)  mg/dL


 


Total Bilirubin  0.1 L   (0.2-1.3)  mg/dL


 


Total Protein  4.7 L   (6.3-8.2)  g/dL


 


Albumin  2.2 L   (3.5-5.0)  g/dL








 Microbiology - Last 24 Hours (Table)











 06/28/18 14:15 Blood Culture Gram Stain - Preliminary





 Blood 


 


 06/28/18 14:18 Blood Culture Gram Stain - Preliminary





 Blood 


 


 06/28/18 14:18 Blood Culture - Final





 Blood 














Assessment and Plan


(1) Diverticulitis


Narrative/Plan: 


Continue diet as tolerated.  Continue ambulating.  Possible discharge to rehab 

tomorrow.


Current Visit: Yes   Status: Acute   Code(s): K57.92 - DVTRCLI OF INTEST, PART 

UNSP, W/O PERF OR ABSCESS W/O BLEED   SNOMED Code(s): 388885466

## 2018-07-02 LAB
BASOPHILS # BLD AUTO: 0 K/UL (ref 0–0.2)
BASOPHILS NFR BLD AUTO: 1 %
EOSINOPHIL # BLD AUTO: 0.2 K/UL (ref 0–0.7)
EOSINOPHIL NFR BLD AUTO: 2 %
ERYTHROCYTE [DISTWIDTH] IN BLOOD BY AUTOMATED COUNT: 3.98 M/UL (ref 4.3–5.9)
ERYTHROCYTE [DISTWIDTH] IN BLOOD: 14.2 % (ref 11.5–15.5)
HCT VFR BLD AUTO: 36.5 % (ref 39–53)
HGB BLD-MCNC: 12 GM/DL (ref 13–17.5)
LYMPHOCYTES # SPEC AUTO: 2 K/UL (ref 1–4.8)
LYMPHOCYTES NFR SPEC AUTO: 31 %
MCH RBC QN AUTO: 30.1 PG (ref 25–35)
MCHC RBC AUTO-ENTMCNC: 32.7 G/DL (ref 31–37)
MCV RBC AUTO: 91.9 FL (ref 80–100)
MONOCYTES # BLD AUTO: 0.4 K/UL (ref 0–1)
MONOCYTES NFR BLD AUTO: 7 %
NEUTROPHILS # BLD AUTO: 3.5 K/UL (ref 1.3–7.7)
NEUTROPHILS NFR BLD AUTO: 55 %
PLATELET # BLD AUTO: 370 K/UL (ref 150–450)
WBC # BLD AUTO: 6.4 K/UL (ref 3.8–10.6)

## 2018-07-02 RX ADMIN — ASPIRIN 81 MG CHEWABLE TABLET SCH MG: 81 TABLET CHEWABLE at 08:27

## 2018-07-02 RX ADMIN — DEXTROSE MONOHYDRATE SCH MLS/HR: 5 INJECTION, SOLUTION INTRAVENOUS at 08:25

## 2018-07-02 RX ADMIN — ONDANSETRON PRN MG: 2 INJECTION INTRAMUSCULAR; INTRAVENOUS at 13:07

## 2018-07-02 RX ADMIN — METOPROLOL TARTRATE SCH MG: 25 TABLET, FILM COATED ORAL at 08:27

## 2018-07-02 RX ADMIN — HEPARIN SODIUM SCH UNIT: 5000 INJECTION, SOLUTION INTRAVENOUS; SUBCUTANEOUS at 08:27

## 2018-07-02 RX ADMIN — ATORVASTATIN CALCIUM SCH MG: 80 TABLET, FILM COATED ORAL at 08:27

## 2018-07-02 RX ADMIN — DEXTROSE MONOHYDRATE SCH MLS/HR: 5 INJECTION, SOLUTION INTRAVENOUS at 16:00

## 2018-07-02 RX ADMIN — FLUCONAZOLE, SODIUM CHLORIDE SCH MLS/HR: 2 INJECTION INTRAVENOUS at 20:40

## 2018-07-02 RX ADMIN — TAMSULOSIN HYDROCHLORIDE SCH MG: 0.4 CAPSULE ORAL at 08:27

## 2018-07-02 RX ADMIN — HEPARIN SODIUM SCH UNIT: 5000 INJECTION, SOLUTION INTRAVENOUS; SUBCUTANEOUS at 20:40

## 2018-07-02 RX ADMIN — ALVIMOPAN SCH MG: 12 CAPSULE ORAL at 08:25

## 2018-07-02 RX ADMIN — DEXTROSE MONOHYDRATE SCH MLS/HR: 5 INJECTION, SOLUTION INTRAVENOUS at 01:27

## 2018-07-02 RX ADMIN — HYDROCODONE BITARTRATE AND ACETAMINOPHEN PRN EACH: 7.5; 325 TABLET ORAL at 05:48

## 2018-07-02 RX ADMIN — ALVIMOPAN SCH MG: 12 CAPSULE ORAL at 20:40

## 2018-07-02 RX ADMIN — PANTOPRAZOLE SODIUM SCH MG: 40 TABLET, DELAYED RELEASE ORAL at 08:25

## 2018-07-02 RX ADMIN — HYDROCODONE BITARTRATE AND ACETAMINOPHEN PRN EACH: 7.5; 325 TABLET ORAL at 18:53

## 2018-07-02 RX ADMIN — METOPROLOL TARTRATE SCH MG: 25 TABLET, FILM COATED ORAL at 20:40

## 2018-07-02 NOTE — P.PN
<Areli Simondanielle CARRERA - Last Filed: 07/02/18 10:02>





Subjective


Progress Note Date: 07/02/18





58-year-old male seen sitting up in a chair legs elevated.  Patient reports 

having episode last evening where the ostomy appliance came apart and leaked 

onto surgical incision.  Ostomy currently has a small amount of liquid stool in 

the bag.  Surgical dressing reapplied with the foam to surgical incision site 

current dressing is dry.  Abdominal binder in place.  Patient states he has 

been up  anxious to be discharged to the rehab unit lowering diet no nausea 

vomiting.  Afebrile current temp 98 white count 6.4   hemoglobin 12  

electrolytes within normal limits








Status post June 25th diagnostic arthroscopic converted to open small bowel 

decompression sigmoid colectomy involving a large proximal sigmoid tumor, 

descending colostomy creation Lacy's procedure with application of prevena 

wound system











Objective





- Vital Signs


Vital signs: 


 Vital Signs











Temp  98.0 F   07/02/18 05:40


 


Pulse  87   07/02/18 05:40


 


Resp  16   07/02/18 05:40


 


BP  142/85   07/02/18 05:40


 


Pulse Ox  97   07/02/18 05:40








 Intake & Output











 07/01/18 07/02/18 07/02/18





 18:59 06:59 18:59


 


Intake Total 240  


 


Output Total 200 400 


 


Balance 40 -400 


 


Weight  75.7 kg 


 


Intake:   


 


  Oral 240  


 


Output:   


 


  Urine 200 400 


 


Other:   


 


  Voiding Method Toilet  





 Urinal  


 


  # Voids  1 














- Exam





Physical exam


Alert sitting up in a chair


Lungs adequate air movement bilaterally on room air no shortness of breath


Heart S1-S2 audible regular denying chest pain


Abdomen abdominal binder in place surgical dressing site dry.  Ostomy moderate 

amount of stool in the ostomy bag tolerating diet with no nausea no vomiting 

surgical tenderness appropriate not distended


Extremities decrease edema to the bilateral lower extremities improved





- Labs


CBC & Chem 7: 


 07/02/18 06:56





 07/01/18 07:03


Labs: 


 Abnormal Lab Results - Last 24 Hours (Table)











  07/02/18 Range/Units





  06:56 


 


RBC  3.98 L  (4.30-5.90)  m/uL


 


Hgb  12.0 L  (13.0-17.5)  gm/dL


 


Hct  36.5 L  (39.0-53.0)  %








 Microbiology - Last 24 Hours (Table)











 06/28/18 14:18 Blood Culture Gram Stain - Final





 Blood Blood Culture - Final





    Candida parapsilosis


 


 06/28/18 14:15 Blood Culture Gram Stain - Final





 Blood Blood Culture - Final





    Candida parapsilosis














Assessment and Plan


Assessment: 





Impression


Present on admission abdominal pain nausea vomiting suspect due to partial 

bowel obstruction involving small and large bowel


CAT scan abdomen and pelvis obtained on admission report indicates mild acute 

diverticulitis mid to distal sigmoid colon causing partial small bowel 

obstruction involving small large bowel


Known coronary artery disease with prior coronary stenting done October 2017 on 

dual antiplatelet therapy Plavix and aspirin


History of tobacco abuse recently quit


A recent colonoscopy to the sigmoid colon with flexible sigmoidoscopy done 

February 2018 showed scattered diverticulosis of the sigmoid colon with severe 

torturous sigmoid colon with no evidence of an active bleed as part of a workup 

for acute blood loss anemia


A recent EGD February 2018 no evidence of an active duodenitis, no duodenal 

ulcer: Gastric ulcer


Abdominal x-ray done on the 18th findings are suggestive of severe ileus 


CAT scan abdomen and pelvis:colon Collapse with severely dilated small bowel


History of chronic daily alcohol use with no evidence of impending withdrawals


Status post June 25 diagnostic laparoscopic converted to open, small bowel 

decompression, sigmoid colectomy involving the large proximal sigmoid tumor, 

descending ostomy creation Lacy's procedure with peritoneal lavage 1 L, 

application of prevena wound system


Febrile persist improved


Pathology report from the sigmoid showed no evidence of a malignancy


Bacteremia positive blood culture for candida parapsilosis





Plan


Okay to proceed with a discharge from surgical service defer to the timing to 

the attending


Discharge plan  anticipate subacute rehab


Infectious disease consult recommendations antibiotic


Continue postop surgical care


Ostomy teaching with appropriate


DVT and GI prophylaxis


Pain control


 











Progress note dictated for Dr. Desai 








The above impression and plan of care have been discussed and directed by 

signing physician. Marlee Simon nurse practitioner acting as scribe for signing 

physician.





<Eliz Desai - Last Filed: 07/02/18 17:46>





Objective





- Vital Signs


Vital signs: 


 Vital Signs











Temp  98.3 F   07/02/18 15:33


 


Pulse  86   07/02/18 15:33


 


Resp  16   07/02/18 15:33


 


BP  128/77   07/02/18 15:33


 


Pulse Ox  98   07/02/18 15:33








 Intake & Output











 07/01/18 07/02/18 07/02/18





 18:59 06:59 18:59


 


Intake Total 240  


 


Output Total 200 400 100


 


Balance 40 -400 -100


 


Weight  75.7 kg 75.7 kg


 


Intake:   


 


  Oral 240  


 


Output:   


 


  Drainage   100


 


    Abdomen   100


 


  Urine 200 400 


 


Other:   


 


  Voiding Method Toilet  





 Urinal  


 


  # Voids  1 














- Labs


CBC & Chem 7: 


 07/02/18 06:56





 07/01/18 07:03


Labs: 


 Abnormal Lab Results - Last 24 Hours (Table)











  07/02/18 Range/Units





  06:56 


 


RBC  3.98 L  (4.30-5.90)  m/uL


 


Hgb  12.0 L  (13.0-17.5)  gm/dL


 


Hct  36.5 L  (39.0-53.0)  %








 Microbiology - Last 24 Hours (Table)











 06/28/18 14:18 Blood Culture Gram Stain - Final





 Blood Blood Culture - Final





    Candida parapsilosis


 


 06/28/18 14:15 Blood Culture Gram Stain - Final





 Blood Blood Culture - Final





    Candida parapsilosis














Assessment and Plan


(1) Partial bowel obstruction


Current Visit: Yes   Status: Acute   Code(s): K56.600 - PARTIAL INTESTINAL 

OBSTRUCTION, UNSPECIFIED AS TO CAUSE   SNOMED Code(s): 22395147


   





(2) Intractable left lower quadrant abdominal pain


Current Visit: Yes   Status: Acute   Code(s): R10.32 - LEFT LOWER QUADRANT PAIN

   SNOMED Code(s): 434853578


   





(3) Neoplasm of sigmoid colon


Current Visit: Yes   Status: Acute   Code(s): D49.0 - NEOPLASM OF UNSPECIFIED 

BEHAVIOR OF DIGESTIVE SYSTEM   SNOMED Code(s): 022074108


   





(4) Sigmoid volvulus


Current Visit: Yes   Status: Acute   Code(s): K56.2 - VOLVULUS   SNOMED Code(s)

: 260900763


   





(5) Large bowel obstruction


Current Visit: Yes   Status: Acute   Code(s): K56.609 - UNSP INTESTNL OBST, 

UNSP AS TO PARTIAL VERSUS COMPLETE OBST   SNOMED Code(s): 020343054


   





(6) Sigmoid diverticulosis


Current Visit: Yes   Status: Acute   Code(s): K57.30 - DVRTCLOS OF LG INT W/O 

PERFORATION OR ABSCESS W/O BLEEDING   SNOMED Code(s): 010736441


   





(7) Ischemic cardiomyopathy


Current Visit: Yes   Status: Acute   Code(s): I25.5 - ISCHEMIC CARDIOMYOPATHY   

SNOMED Code(s): 200928166


   





(8) History of cardiac catheterization


Current Visit: Yes   Status: Acute   Code(s): Z98.890 - OTHER SPECIFIED 

POSTPROCEDURAL STATES   SNOMED Code(s): 97827664977345

## 2018-07-02 NOTE — PN
PROGRESS NOTE



DATE OF SERVICE:

07/02/2018



PRESENTING COMPLAINT:

Bowel obstruction.



INTERVAL HISTORY:

Patient is status post bowel surgery, felt to have intraabdominal fecal contamination

from perforated diverticulitis.  The patient's blood cultures and wound cultures have

been growing fungal elements. I spoke to Dr. Arango this morning over the phone, and

both of us agree that we need to get at least one set of negative blood cultures before

patient is safe to go back to the ECF.  This was conveyed to the patient, who was not

very happy about the fact but does understand the severity of the problem. I did

explain this to him at length.  The patient is tolerating a diet.  Colostomy is

working.



REVIEW OF SYSTEMS:

Done for constitutional, cardiovascular, GI, pulmonary, lymphatic; relevant findings as

above.



CURRENT MEDICATIONS:

Reviewed. They include IV Diflucan and IV Zosyn.



PHYSICAL EXAMINATION:

On examination, afebrile, pulse 87, respiration 16, blood pressure 142/85, pulse ox 97%

on room air.

GENERAL APPEARANCE:  Sitting up, awake.

EYES: Pupils equal. Conjunctivae normal.

HEENT: External appearance of nose and ears normal. Oral cavity normal.

NECK: JVD not raised.  Mass not palpable.

RESPIRATORY: Effort normal.

LUNGS: Slightly decreased breath sounds.

CARDIOVASCULAR: First and second sounds normal. No edema.

ABDOMEN: Soft. Mild tenderness.  Colostomy bag with some liquid stool in place.

PSYCHIATRY: Alert and oriented x3. Mood and affect is normal.



INVESTIGATIONS:

White count 6.5, hemoglobin 12.



ASSESSMENT:

1. Possible acute sigmoid diverticulitis with perforation with secondary peritonitis

    from fecal soiling.

2. Fungemia with blood cultures positive for Candida parapsilosis.

3. Chronic alcohol use.

4. Coronary artery disease with stent to the RCA and diffuse disease in the LAD.

5. Acute renal failure prerenal/acute tubular necrosis, resolved.

6. Hyperkalemia, hypercalcemia, improved.

7. Hypoalbuminemia as an acute phase reactant.

8. Sigmoid colectomy.



PLAN:

As discussed above, patient to continue with the IV antibiotics.  Patient's PICC line

will be taken out today and tip will be sent off for culture.  I did tell the patient

the cultures could well take 3 to 4 days to come back, and I need to wait for Dr. Arango

to make a determination then when he is safe to go back to the ECF.





MMODL / IJN: [id number] / Job#: [id number]

## 2018-07-02 NOTE — PN
PROGRESS NOTE



DATE OF SERVICE:

07/01/2018



PRESENTING COMPLAINT:

Bowel obstruction.



INTERVAL HISTORY:

Patient is status post bowel obstruction surgery. Tolerating a soft diet.  Colostomy is

working.  Pain is controlled.  He is on IV antibiotics. The patient is ambulatory.



REVIEW OF SYSTEMS:

Review of systems done for constitutional, cardiovascular, GI, pulmonary; relevant

findings as above.



CURRENT MEDICATIONS:

Current medications are reviewed include IV Diflucan and IV Zosyn.



PHYSICAL EXAMINATION:

On examination, afebrile, pulse 84, respiratory 18, blood pressure 136/75, pulse ox 97%

on room air.

GENERAL APPEARANCE:  Sitting up on a chair, awake, comfortable.

EYES: Pupils equal. Conjunctivae normal.

HENT: External appearance of nose and ears normal. Oral cavity normal.

NECK: JVD not raised.  Mass not palpable.

RESPIRATORY: Effort normal.

LUNGS: Slightly decreased breath sounds.

CARDIOVASCULAR: First and second sounds normal. No edema.

ABDOMEN:  Soft. Liver and spleen not palpable.  Mild tenderness.  Colostomy bag with

some stool in place.

PSYCHIATRY: Alert and oriented x3.  Mood and affect normal.



INVESTIGATIONS:

White count 5.4, potassium 4.1.  Patient's blood cultures x2 growing Candida

parapsilosis.



ASSESSMENT:

1. Possible acute sigmoid diverticulitis with perforation with secondary peritonitis

    from fecal soiling.

2. Fungemia with blood cultures positive for Candida parapsilosis.

3. Chronic alcohol use.

4. Coronary artery disease with stent to the right coronary artery and diffuse

    diseased to left anterior descending artery.

5. Acute renal failure, prerenal/acute tubular necrosis, now resolved.

6. Hyperkalemia, improved.

7. Hypercalcemia from dehydration, improved.

8. Hypoalbuminemia as an acute phase reactant.

9. Sigmoid colectomy.



PLAN:

Continue with IV antibiotics including IV Zosyn and IV Diflucan.  Discharge antibiotics

will be coordinated by Dr. Arango who will make further evaluation tomorrow.  Care was

discussed with the patient.





MMODL / IJN: [id number] / Job#: [id number]

## 2018-07-02 NOTE — PN
PROGRESS NOTE



DATE OF SERVICE:

07/01/2018.



REASON FOR FOLLOWUP:

1. Fever.

2. Positive blood culture with Candida parapsilosis.



INTERVAL HISTORY:

The patient overall clinical course complicated by development of fungemia with blood

culture positive for Candida species.  The patient denies having any chest pain or

shortness of breath or cough. Abdominal pain has slightly decreased. Unable to tolerate

diet.  No nausea, vomiting or diarrhea.



REVIEW OF SYSTEMS:

Positive for weakness.  Rest of systems have been negative.



PAST MEDICAL AND SURGICAL HISTORY:

Reviewed.



MEDICATION:

No change in medications; reviewed.



PHYSICAL EXAMINATION:

Blood pressure 130/81 with a pulse of 83, temperature 98.7. He is 93% on room air.

General description is a middle-aged male lying in bed in no distress.

HEENT EXAMINATION: Pallor, no scleral icterus. Oral mucous membrane dry.

NECK: Trachea central. No thyromegaly.

LUNGS: Unlabored breathing. Clear to auscultation anteriorly.

HEART: S1, S2.  Regular rate and rhythm.

ABDOMEN: Soft, no tenderness. No guarding, no rigidity.

EXTREMITIES:  No edema of the feet.



LABS:

Hemoglobin 11.2, white count 5.5 with a BUN of 4, creatinine 0.65.  Blood culture with

Candida parapsilosis.



DIAGNOSTIC IMPRESSION AND PLAN:

Patient with fever, now with positive blood culture with Candida parapsilosis in a

patient who did have a recent abdominal surgery and small bowel obstruction. Concern

possible for abdominal source as the PICC line was just placed about a week ago.

Although PICC site not entirely excluded.  Blood cultures will be repeated both from

the PICC and periphery and the patient was given Diflucan 400 IV piggyback daily.

Continue with Zosyn.  Continue supportive care.





MMODL / IJN: [id number] / Job#: [id number]

## 2018-07-03 LAB
BASOPHILS # BLD AUTO: 0 K/UL (ref 0–0.2)
BASOPHILS NFR BLD AUTO: 1 %
EOSINOPHIL # BLD AUTO: 0.1 K/UL (ref 0–0.7)
EOSINOPHIL NFR BLD AUTO: 2 %
ERYTHROCYTE [DISTWIDTH] IN BLOOD BY AUTOMATED COUNT: 3.61 M/UL (ref 4.3–5.9)
ERYTHROCYTE [DISTWIDTH] IN BLOOD: 14.3 % (ref 11.5–15.5)
HCT VFR BLD AUTO: 33 % (ref 39–53)
HGB BLD-MCNC: 10.8 GM/DL (ref 13–17.5)
LYMPHOCYTES # SPEC AUTO: 1.6 K/UL (ref 1–4.8)
LYMPHOCYTES NFR SPEC AUTO: 32 %
MCH RBC QN AUTO: 30 PG (ref 25–35)
MCHC RBC AUTO-ENTMCNC: 32.8 G/DL (ref 31–37)
MCV RBC AUTO: 91.4 FL (ref 80–100)
MONOCYTES # BLD AUTO: 0.3 K/UL (ref 0–1)
MONOCYTES NFR BLD AUTO: 6 %
NEUTROPHILS # BLD AUTO: 2.8 K/UL (ref 1.3–7.7)
NEUTROPHILS NFR BLD AUTO: 55 %
PLATELET # BLD AUTO: 398 K/UL (ref 150–450)
WBC # BLD AUTO: 5.1 K/UL (ref 3.8–10.6)

## 2018-07-03 RX ADMIN — HYDROCODONE BITARTRATE AND ACETAMINOPHEN PRN EACH: 7.5; 325 TABLET ORAL at 23:31

## 2018-07-03 RX ADMIN — HEPARIN SODIUM SCH UNIT: 5000 INJECTION, SOLUTION INTRAVENOUS; SUBCUTANEOUS at 20:05

## 2018-07-03 RX ADMIN — FLUCONAZOLE, SODIUM CHLORIDE SCH MLS/HR: 2 INJECTION INTRAVENOUS at 20:05

## 2018-07-03 RX ADMIN — TAMSULOSIN HYDROCHLORIDE SCH MG: 0.4 CAPSULE ORAL at 08:23

## 2018-07-03 RX ADMIN — DEXTROSE MONOHYDRATE SCH: 5 INJECTION, SOLUTION INTRAVENOUS at 17:34

## 2018-07-03 RX ADMIN — HYDROCODONE BITARTRATE AND ACETAMINOPHEN PRN EACH: 7.5; 325 TABLET ORAL at 01:06

## 2018-07-03 RX ADMIN — HEPARIN SODIUM SCH UNIT: 5000 INJECTION, SOLUTION INTRAVENOUS; SUBCUTANEOUS at 08:23

## 2018-07-03 RX ADMIN — ASPIRIN 81 MG CHEWABLE TABLET SCH MG: 81 TABLET CHEWABLE at 08:23

## 2018-07-03 RX ADMIN — ATORVASTATIN CALCIUM SCH MG: 80 TABLET, FILM COATED ORAL at 08:23

## 2018-07-03 RX ADMIN — METOPROLOL TARTRATE SCH MG: 25 TABLET, FILM COATED ORAL at 20:05

## 2018-07-03 RX ADMIN — METOPROLOL TARTRATE SCH MG: 25 TABLET, FILM COATED ORAL at 08:23

## 2018-07-03 RX ADMIN — PANTOPRAZOLE SODIUM SCH MG: 40 TABLET, DELAYED RELEASE ORAL at 08:23

## 2018-07-03 RX ADMIN — DEXTROSE MONOHYDRATE SCH MLS/HR: 5 INJECTION, SOLUTION INTRAVENOUS at 08:23

## 2018-07-03 RX ADMIN — DEXTROSE MONOHYDRATE SCH MLS/HR: 5 INJECTION, SOLUTION INTRAVENOUS at 23:31

## 2018-07-03 RX ADMIN — DEXTROSE MONOHYDRATE SCH MLS/HR: 5 INJECTION, SOLUTION INTRAVENOUS at 00:55

## 2018-07-03 NOTE — PN
PROGRESS NOTE



DATE OF SERVICE:

07/03/2018



REASON FOR FOLLOWUP:

Candida parapsilosis candidemia secondary to possible abdominal source.



INTERVAL HISTORY:

The patient is afebrile, he has been breathing comfortably.  Denies having any chest

pain or shortness of breath or cough.  No abdominal pain.  No nausea, no vomiting.

Tolerating his diet.



PHYSICAL EXAMINATION:

Blood pressure is 136/74 with a pulse of 79, temperature 98.5.  He is 96% on room air.

General description is a middle-aged male, lying in bed.

RESPIRATORY SYSTEM:  Unlabored breathing, clear to auscultation anteriorly.

HEART: S1, S2.  Regular rate and rhythm.

ABDOMEN: Soft, no tenderness.



LABS:

Hemoglobin is 10.8, white count of 5.1.  Blood cultures repeat 7/1, one coming positive

with yeast.



DIAGNOSTIC IMPRESSION AND PLAN:

Patient with Candida parapsilosis candidemia, source could be abdominal with the PICC

line, the catheter tip so far negative, note the 7/1 culture positive. Will repeat

blood cultures x1 today to document clearance of his candidemia and keep the patient on

Diflucan, hold the discharge until his blood culture has cleared up.  Continue

supportive care.





MMODL / IJN: [id number] / Job#: [id number]

## 2018-07-03 NOTE — P.PN
Subjective


Progress Note Date: 07/03/18





58-year-old male sitting up in bed.  Denies nausea vomiting.  States pain 

medication effective for pain control ostomy functioning.  Surgical dressing 

bloody drainage noted.  Abdomen soft bowel tones present.  States tolerating 

diet.  Ostomy teaching with the ostomy nurse scheduled today at 11:30





White count 5.1 hemoglobin 10.8 blood culture done on July 1 show no growth to 

date











Status post June 25th diagnostic arthroscopic converted to open small bowel 

decompression sigmoid colectomy involving a large proximal sigmoid tumor, 

descending colostomy creation Lacy's procedure with application of prevena 

wound system








Objective





- Vital Signs


Vital signs: 


 Vital Signs











Temp  98.5 F   07/03/18 05:35


 


Pulse  79   07/03/18 05:35


 


Resp  14   07/03/18 05:35


 


BP  136/74   07/03/18 05:35


 


Pulse Ox  96   07/03/18 05:35








 Intake & Output











 07/02/18 07/03/18 07/03/18





 18:59 06:59 18:59


 


Output Total 100  


 


Balance -100  


 


Weight 75.7 kg 74.5 kg 


 


Output:   


 


  Drainage 100  


 


    Abdomen 100  


 


Other:   


 


  Voiding Method  Toilet 





  Urinal 














- Exam





Physical exam


58-year-old male sitting up in bed appears in no acute distress


Lungs adequate air movement bilaterally on room air


Heart S1-S2 audible and regular denies chest pain


Abdomen abdominal binder in place ostomy functioning moderate amount of stool 

surgical dressing old dried bloody drainage noted on the dressing surgical 

tenderness appropriate states urinating no difficulty tolerating diet no nausea 

no vomiting


Extremities a trace pedal edema bilaterally





- Labs


CBC & Chem 7: 


 07/03/18 07:46





 07/01/18 07:03


Labs: 


 Abnormal Lab Results - Last 24 Hours (Table)











  07/03/18 Range/Units





  07:46 


 


RBC  3.61 L  (4.30-5.90)  m/uL


 


Hgb  10.8 L  (13.0-17.5)  gm/dL


 


Hct  33.0 L  (39.0-53.0)  %








 Microbiology - Last 24 Hours (Table)











 07/01/18 23:22 Blood Culture - Preliminary





 Blood    No Growth after 24 hours


 


 07/01/18 22:28 Blood Culture - Preliminary





 Blood    No Growth after 24 hours


 


 07/02/18 13:34 Catheter Tip Culture - Preliminary





 Picc Line 


 


 06/28/18 14:18 Blood Culture Gram Stain - Final





 Blood Blood Culture - Final





    Candida parapsilosis














Assessment and Plan


Assessment: 





Impression


Present on admission abdominal pain nausea vomiting suspect due to partial 

bowel obstruction involving small and large bowel


CAT scan abdomen and pelvis obtained on admission report indicates mild acute 

diverticulitis mid to distal sigmoid colon causing partial small bowel 

obstruction involving small large bowel


Known coronary artery disease with prior coronary stenting done October 2017 on 

dual antiplatelet therapy Plavix and aspirin


History of tobacco abuse recently quit


A recent colonoscopy to the sigmoid colon with flexible sigmoidoscopy done 

February 2018 showed scattered diverticulosis of the sigmoid colon with severe 

torturous sigmoid colon with no evidence of an active bleed as part of a workup 

for acute blood loss anemia


A recent EGD February 2018 no evidence of an active duodenitis, no duodenal 

ulcer: Gastric ulcer


Abdominal x-ray done on the 18th findings are suggestive of severe ileus 


CAT scan abdomen and pelvis:colon Collapse with severely dilated small bowel


History of chronic daily alcohol use with no evidence of impending withdrawals


Status post June 25 diagnostic laparoscopic converted to open, small bowel 

decompression, sigmoid colectomy involving the large proximal sigmoid tumor, 

descending ostomy creation Alcy's procedure with peritoneal lavage 1 L, 

application of prevena wound system


Febrile persist improved


Pathology report from the sigmoid showed no evidence of a malignancy


Bacteremia positive blood culture for candida parapsilosis





Plan


Okay to proceed with a discharge from surgical service defer to the timing to 

the attending and infectious disease


Discharge plan  anticipate subacute rehab when appropriate


Infectious disease  recommendations antibiotic


Continue postop surgical care


Ostomy teaching with appropriate


DVT and GI prophylaxis


Pain control


 











Progress note dictated for Dr. Desai 








The above impression and plan of care have been discussed and directed by 

signing physician. Marlee Simon nurse practitioner acting as scribe for signing 

physician.

## 2018-07-03 NOTE — PN
PROGRESS NOTE



DATE OF SERVICE:

07/02/2018



REASON FOR FOLLOWUP:

Candida parapsilosis candidemia, source likely abdominal.



INTERVAL HISTORY:

The patient has been afebrile, has been breathing comfortably.  Denies having any chest

pain or shortness of breath or cough.  No nausea or vomiting. Abdominal pain is

currently improved.  Tolerating his diet.



PHYSICAL EXAMINATION:

On examination, blood pressure is 126/73 with a pulse of 73, temperature 98.8.  He is

97% on room air.

General description is a middle-aged male lying in bed in no distress.

RESPIRATORY SYSTEM:  Unlabored breathing, clear to auscultation anteriorly.

HEART: S1, S2.  Regular rate and rhythm.

ABDOMEN:  Soft. Incision is currently intact.  No drainage.



LABS:

Hemoglobin is 12, white count 6.4. BUN of 4, creatinine 0.65.



DIAGNOSTIC IMPRESSION AND PLAN:

Patient with Candida parapsilosis candidemia source possible abdominal versus a PICC

line.  His PICC line was discontinued and tip has been sent for the culture.  Blood

culture with _____ finalize. Keep the patient on Diflucan to make sure we will repeat

blood cultures negative before discharging him on oral Diflucan. Family and wife was

present at bedside.  Questions were answered. Plan of care discussed with the admitting

physician.





MMODL / IJN: [id number] / Job#: [id number]

## 2018-07-04 VITALS — RESPIRATION RATE: 16 BRPM

## 2018-07-04 RX ADMIN — HYDROCODONE BITARTRATE AND ACETAMINOPHEN PRN EACH: 7.5; 325 TABLET ORAL at 05:09

## 2018-07-04 RX ADMIN — METOPROLOL TARTRATE SCH MG: 25 TABLET, FILM COATED ORAL at 08:34

## 2018-07-04 RX ADMIN — DEXTROSE MONOHYDRATE SCH MLS/HR: 5 INJECTION, SOLUTION INTRAVENOUS at 08:35

## 2018-07-04 RX ADMIN — DEXTROSE MONOHYDRATE SCH MLS/HR: 5 INJECTION, SOLUTION INTRAVENOUS at 23:14

## 2018-07-04 RX ADMIN — METOPROLOL TARTRATE SCH MG: 25 TABLET, FILM COATED ORAL at 21:15

## 2018-07-04 RX ADMIN — HEPARIN SODIUM SCH UNIT: 5000 INJECTION, SOLUTION INTRAVENOUS; SUBCUTANEOUS at 21:15

## 2018-07-04 RX ADMIN — HYDROCODONE BITARTRATE AND ACETAMINOPHEN PRN EACH: 7.5; 325 TABLET ORAL at 13:22

## 2018-07-04 RX ADMIN — ATORVASTATIN CALCIUM SCH MG: 80 TABLET, FILM COATED ORAL at 08:34

## 2018-07-04 RX ADMIN — HYDROCODONE BITARTRATE AND ACETAMINOPHEN PRN EACH: 7.5; 325 TABLET ORAL at 19:18

## 2018-07-04 RX ADMIN — DEXTROSE MONOHYDRATE SCH MLS/HR: 5 INJECTION, SOLUTION INTRAVENOUS at 17:23

## 2018-07-04 RX ADMIN — FLUCONAZOLE, SODIUM CHLORIDE SCH MLS/HR: 2 INJECTION INTRAVENOUS at 21:15

## 2018-07-04 RX ADMIN — ASPIRIN 81 MG CHEWABLE TABLET SCH MG: 81 TABLET CHEWABLE at 08:34

## 2018-07-04 RX ADMIN — PANTOPRAZOLE SODIUM SCH MG: 40 TABLET, DELAYED RELEASE ORAL at 08:35

## 2018-07-04 RX ADMIN — TAMSULOSIN HYDROCHLORIDE SCH MG: 0.4 CAPSULE ORAL at 08:34

## 2018-07-04 RX ADMIN — HEPARIN SODIUM SCH UNIT: 5000 INJECTION, SOLUTION INTRAVENOUS; SUBCUTANEOUS at 08:34

## 2018-07-04 NOTE — P.PN
Subjective


Progress Note Date: 07/04/18


Principal diagnosis: 





Galvez's procedure





Patient doing well.  Denies pain.  Waiting for final cultures.  Tolerating diet.





Objective





- Vital Signs


Vital signs: 


 Vital Signs











Temp  97.4 F L  07/04/18 06:01


 


Pulse  77   07/04/18 06:01


 


Resp  18   07/04/18 06:01


 


BP  157/85   07/04/18 06:01


 


Pulse Ox  98   07/04/18 06:01








 Intake & Output











 07/03/18 07/04/18 07/04/18





 18:59 06:59 18:59


 


Intake Total 200  


 


Output Total  350 


 


Balance 200 -350 


 


Weight  72.575 kg 


 


Intake:   


 


  Oral 200  


 


Output:   


 


  Urine  350 


 


Other:   


 


  Voiding Method  Toilet 





  Urinal 














- Exam





Abdomen: Soft, nondistended, incision with some serosanguineous drainage 

although decreasing, nontender





- Labs


CBC & Chem 7: 


 07/03/18 07:46





 07/01/18 07:03


Labs: 


 Microbiology - Last 24 Hours (Table)











 07/01/18 22:28 Blood Culture - Preliminary





 Blood    No Growth after 48 hours


 


 07/01/18 23:22 Blood Culture Gram Stain - Preliminary





 Blood 


 


 07/01/18 23:22 Blood Culture - Final





 Blood 


 


 07/02/18 13:34 Catheter Tip Culture - Preliminary





 Picc Line 














Assessment and Plan


(1) Diverticulitis


Narrative/Plan: 


Patient may shower today.  Continue local wound care.  Home when cleared by 

infectious disease.


Current Visit: Yes   Status: Acute   Code(s): K57.92 - DVTRCLI OF INTEST, PART 

UNSP, W/O PERF OR ABSCESS W/O BLEED   SNOMED Code(s): 818268167

## 2018-07-04 NOTE — PN
PROGRESS NOTE



DATE OF SERVICE:

07/04/2018



PRESENTING COMPLAINT:

Bowel obstruction.



INTERVAL HISTORY:

Patient is status post abdominal surgery with intra-abdominal fecal contamination from

a perforated diverticulitis.  The patient's blood cultures remain to be positive for

fungal elements.  Otherwise, patient tolerating a diet.  Colostomy is working.  Pain is

controlled.  Up and about.



REVIEW OF SYSTEMS:

Done for constitutional, cardiovascular, GI, pulmonary; relevant findings as above.



CURRENT MEDICATIONS:

Reviewed that include:

1. IV Diflucan and.

2. IV Zosyn.



EXAMINATION:

Temperature 97.4, pulse 77, respirations 18, blood pressure 157/85, pulse ox 98% on

room air.

GENERAL APPEARANCE:  Lying in bed, comfortable.

EYES:  Pupils equal.  Conjunctivae normal.

HEENT:  External nose and ears normal.  Oral cavity normal.

NECK:  JVD not raised.  Mass not palpable.

RESPIRATORY:  Effort normal.

LUNGS:  Decreased breath sounds.

CARDIOVASCULAR:  First and second sounds normal.  No edema.

ABDOMEN:  Soft, minimal tenderness.  Colostomy bag in place.  Stool in place.

PSYCHIATRY:  Alert and oriented x3.  Mood and affect were normal.



INVESTIGATIONS:

No blood work from today.  Patient's blood cultures from 07/01/2018 positive for

Candida parapsilosis.  The patient's PICC line tip culture negative until now.



ASSESSMENT:

1. Acute sigmoid diverticulitis with perforation with secondary peritonitis and fecal

    soiling.

2. Fungemia with blood cultures positive for Candida parapsilosis.  Repeat blood

    cultures from 07/01/2018 are positive.  Repeat blood cultures are pending from this

    morning and yesterday.

3. Chronic alcohol use.

4. Coronary artery disease with stent to the right coronary artery and diffuse

    diseased left anterior descending.

5. Acute renal failure prerenal acute tubular necrosis, resolved.

6. Hyperkalemia, improved.

7. Hypoalbuminemia as an acute phase reactant.

8. Sigmoid colectomy.



PLAN:

Care was discussed with the patient.  Will need to have blood cultures that are

negative before he can be discharged.  Continue current medication and treatment plan

and follow.





MMODL / IJN: [id number] / Job#: [id number]

## 2018-07-04 NOTE — PN
PROGRESS NOTE



DATE OF SERVICE:

07/03/2018.



PRESENTING COMPLAINT:

Bowel obstruction.



INTERVAL HISTORY:

Patient is status post abdominal surgery, intraabdominal fecal contamination from

perforated diverticulitis.  Patient's blood and abdominal cultures are positive for

fungal elements.  Repeat blood cultures are pending.  Patient is tolerating a soft

bland diet.  Colostomy is working.



REVIEW OF SYSTEMS:

Done for constitutional, cardiovascular, GI, pulmonary; relevant findings as above.



CURRENT MEDICATIONS:

Include IV Diflucan and IV Zosyn.



PHYSICAL EXAMINATION:

On examination, afebrile, pulse 79, respirations 14, blood pressure 136/74, pulse ox

96% on room air.

GENERAL APPEARANCE: Sitting up, awake, comfortable.

EYES: Pupils are normal.  Conjunctivae normal.

HEENT: External nose and ears normal. Oral cavity normal.

NECK: JVD not raised. Mass not palpable.  Respiratory effort normal.

LUNGS: Decreased breath sounds.

CARDIOVASCULAR: First and second heart sounds. No edema.

ABDOMEN:  Soft, minimal tenderness.  Colostomy bag with stool in place.

PSYCHIATRY: Alert and oriented x3. Mood and affect normal.



INVESTIGATIONS:

White count 5.1.  The patient's repeat blood culture is growing yeast from 07/01/2018.

The patient's PICC line tip cultures are negative until now.



ASSESSMENT:

1. Acute sigmoid diverticulitis with perforation with secondary peritonitis from fecal

    soiling.

2. Fungemia with blood cultures positive for Candida parapsilosis and repeat blood

    cultures from 07/01/2018 are again positive.

3. Chronic alcohol use.

4. Coronary artery disease with stent to the RCA and diffuse disease in LAD.

5. Acute renal failure/prerenal acute tubular necrosis, resolved.

6. Hyperkalemia, improved.

7. Hypoalbuminemia as an acute phase reactant.

8. Sigmoid colectomy.



PLAN:

The patient's blood culture is still positive.  This is a concern.  PICC line has been

taken out.  Will repeat again blood cultures tomorrow morning. If blood cultures still

remain positive, we may have to consider doing a THERESA.





MMODL / IJN: [id number] / Job#: [id number]

## 2018-07-05 RX ADMIN — METOPROLOL TARTRATE SCH MG: 25 TABLET, FILM COATED ORAL at 20:08

## 2018-07-05 RX ADMIN — DEXTROSE MONOHYDRATE SCH MLS/HR: 5 INJECTION, SOLUTION INTRAVENOUS at 15:06

## 2018-07-05 RX ADMIN — ASPIRIN 81 MG CHEWABLE TABLET SCH MG: 81 TABLET CHEWABLE at 08:08

## 2018-07-05 RX ADMIN — DEXTROSE MONOHYDRATE SCH MLS/HR: 5 INJECTION, SOLUTION INTRAVENOUS at 08:08

## 2018-07-05 RX ADMIN — PANTOPRAZOLE SODIUM SCH MG: 40 TABLET, DELAYED RELEASE ORAL at 06:36

## 2018-07-05 RX ADMIN — HYDROCODONE BITARTRATE AND ACETAMINOPHEN PRN EACH: 7.5; 325 TABLET ORAL at 17:56

## 2018-07-05 RX ADMIN — FLUCONAZOLE, SODIUM CHLORIDE SCH MLS/HR: 2 INJECTION INTRAVENOUS at 20:08

## 2018-07-05 RX ADMIN — DEXTROSE MONOHYDRATE SCH MLS/HR: 5 INJECTION, SOLUTION INTRAVENOUS at 23:16

## 2018-07-05 RX ADMIN — HEPARIN SODIUM SCH UNIT: 5000 INJECTION, SOLUTION INTRAVENOUS; SUBCUTANEOUS at 20:08

## 2018-07-05 RX ADMIN — HEPARIN SODIUM SCH UNIT: 5000 INJECTION, SOLUTION INTRAVENOUS; SUBCUTANEOUS at 08:08

## 2018-07-05 RX ADMIN — HYDROCODONE BITARTRATE AND ACETAMINOPHEN PRN EACH: 7.5; 325 TABLET ORAL at 08:12

## 2018-07-05 RX ADMIN — METOPROLOL TARTRATE SCH MG: 25 TABLET, FILM COATED ORAL at 08:09

## 2018-07-05 RX ADMIN — HYDROCODONE BITARTRATE AND ACETAMINOPHEN PRN EACH: 7.5; 325 TABLET ORAL at 04:30

## 2018-07-05 RX ADMIN — ATORVASTATIN CALCIUM SCH MG: 80 TABLET, FILM COATED ORAL at 08:09

## 2018-07-05 RX ADMIN — TAMSULOSIN HYDROCHLORIDE SCH MG: 0.4 CAPSULE ORAL at 08:09

## 2018-07-05 NOTE — P.PN
<Marlee Simon M - Last Filed: 07/05/18 09:15>





Subjective


Progress Note Date: 07/05/18





Sitting up in bed.  Surgical dressing site dry.  Ostomy moderate amount of soft 

brown stool in bag.  Patient's denying any nausea vomiting dizziness 

lightheadedness chest pain or shortness of breath.  Patient's tolerating a diet 

with no nausea no vomiting.  Continue to wait for further recommendations by 

infectious disease  for final cultures.





Status post June 25th diagnostic arthroscopic converted to open small bowel 

decompression sigmoid colectomy involving a large proximal sigmoid tumor, 

descending colostomy creation Lacy's procedure with application of prevena 

wound system











Objective





- Vital Signs


Vital signs: 


 Vital Signs











Temp  98.0 F   07/05/18 05:50


 


Pulse  78   07/05/18 05:50


 


Resp  16   07/05/18 05:50


 


BP  133/81   07/05/18 05:50


 


Pulse Ox  98   07/05/18 07:08








 Intake & Output











 07/04/18 07/05/18 07/05/18





 18:59 06:59 18:59


 


Intake Total 720  


 


Output Total  200 


 


Balance 720 -200 


 


Weight  71.6 kg 


 


Intake:   


 


  Oral 720  


 


Output:   


 


  Urine  200 


 


Other:   


 


  Voiding Method  Toilet 





  Urinal 


 


  # Voids  1 














- Exam





Physical exam


58-year-old male sitting up in bed talkative states is anxious to be discharged 

to rehab


Lungs clear on room air no shortness of breath


Heart S1-S2 audible regular


Abdomen soft surgical dressing dry ostomy left lower quadrant monitor mold 

liquid stool the day surgical tenderness appropriate urinating no difficulty 

tolerating diet no nausea no vomiting


Extremities no edema noted





- Labs


CBC & Chem 7: 


 07/03/18 07:46





 07/01/18 07:03


Labs: 


 Microbiology - Last 24 Hours (Table)











 07/01/18 22:28 Blood Culture - Preliminary





 Blood    No Growth after 72 hours


 


 07/01/18 23:22 Blood Culture Gram Stain - Final





 Blood Blood Culture - Final





    Candida parapsilosis














Assessment and Plan


Assessment: 





Impression


Present on admission abdominal pain nausea vomiting suspect due to partial 

bowel obstruction involving small and large bowel


CAT scan abdomen and pelvis obtained on admission report indicates mild acute 

diverticulitis mid to distal sigmoid colon causing partial small bowel 

obstruction involving small large bowel


Known coronary artery disease with prior coronary stenting done October 2017 on 

dual antiplatelet therapy Plavix and aspirin


History of tobacco abuse recently quit


A recent colonoscopy to the sigmoid colon with flexible sigmoidoscopy done 

February 2018 showed scattered diverticulosis of the sigmoid colon with severe 

torturous sigmoid colon with no evidence of an active bleed as part of a workup 

for acute blood loss anemia


A recent EGD February 2018 no evidence of an active duodenitis, no duodenal 

ulcer: Gastric ulcer


Abdominal x-ray done on the 18th findings are suggestive of severe ileus 


CAT scan abdomen and pelvis:colon Collapse with severely dilated small bowel


History of chronic daily alcohol use with no evidence of impending withdrawals


Status post June 25 diagnostic laparoscopic converted to open, small bowel 

decompression, sigmoid colectomy involving the large proximal sigmoid tumor, 

descending ostomy creation Lacy's procedure with peritoneal lavage 1 L, 

application of prevena wound system


Febrile persist improved


Pathology report from the sigmoid showed no evidence of a malignancy


Bacteremia positive blood culture for candida parapsilosis





Plan


Okay to proceed with a discharge from surgical service defer to the timing to 

the attending and infectious disease


Discharge plan  anticipate subacute rehab when appropriate


Infectious disease  recommendations antibiotic oral Diflucan for 2 weeks if 

blood cultures that have been repeated are negative


Continue postop surgical care


Ostomy teaching with appropriate


DVT and GI prophylaxis


Pain control


 











Progress note dictated for Dr. Desai 








The above impression and plan of care have been discussed and directed by 

signing physician. aMrlee Simon nurse practitioner acting as scribe for signing 

physician.





<Eliz Desai N - Last Filed: 07/05/18 14:25>





Objective





- Vital Signs


Vital signs: 


 Vital Signs











Temp  98.0 F   07/05/18 05:50


 


Pulse  78   07/05/18 05:50


 


Resp  16   07/05/18 08:00


 


BP  133/81   07/05/18 05:50


 


Pulse Ox  98   07/05/18 07:08








 Intake & Output











 07/04/18 07/05/18 07/05/18





 18:59 06:59 18:59


 


Intake Total 720  


 


Output Total  200 300


 


Balance 720 -200 -300


 


Weight  71.6 kg 71.6 kg


 


Intake:   


 


  Oral 720  


 


Output:   


 


  Urine  200 300


 


Other:   


 


  Voiding Method  Toilet Toilet





  Urinal Urinal


 


  # Voids  1 1














- Labs


CBC & Chem 7: 


 07/03/18 07:46





 07/01/18 07:03


Labs: 


 Microbiology - Last 24 Hours (Table)











 07/04/18 07:43 Blood Culture - Preliminary





 Blood    No Growth after 24 hours


 


 07/01/18 22:28 Blood Culture - Preliminary





 Blood    No Growth after 72 hours


 


 07/01/18 23:22 Blood Culture Gram Stain - Final





 Blood Blood Culture - Final





    Candida parapsilosis














Assessment and Plan


(1) Partial bowel obstruction


Current Visit: Yes   Status: Acute   Code(s): K56.600 - PARTIAL INTESTINAL 

OBSTRUCTION, UNSPECIFIED AS TO CAUSE   SNOMED Code(s): 04623419


   





(2) Intractable left lower quadrant abdominal pain


Current Visit: Yes   Status: Acute   Code(s): R10.32 - LEFT LOWER QUADRANT PAIN

   SNOMED Code(s): 485392129


   





(3) Neoplasm of sigmoid colon


Current Visit: Yes   Status: Acute   Code(s): D49.0 - NEOPLASM OF UNSPECIFIED 

BEHAVIOR OF DIGESTIVE SYSTEM   SNOMED Code(s): 032839657


   





(4) Sigmoid volvulus


Current Visit: Yes   Status: Acute   Code(s): K56.2 - VOLVULUS   SNOMED Code(s)

: [id number]


   





(5) Large bowel obstruction


Current Visit: Yes   Status: Acute   Code(s): K56.609 - UNSP INTESTNL OBST, 

UNSP AS TO PARTIAL VERSUS COMPLETE OBST   SNOMED Code(s): 355138198


   





(6) Sigmoid diverticulosis


Current Visit: Yes   Status: Acute   Code(s): K57.30 - DVRTCLOS OF LG INT W/O 

PERFORATION OR ABSCESS W/O BLEEDING   SNOMED Code(s): 339957257


   





(7) Ischemic cardiomyopathy


Current Visit: Yes   Status: Acute   Code(s): I25.5 - ISCHEMIC CARDIOMYOPATHY   

SNOMED Code(s): 429639010


   





(8) History of cardiac catheterization


Current Visit: Yes   Status: Acute   Code(s): Z98.890 - OTHER SPECIFIED 

POSTPROCEDURAL STATES   SNOMED Code(s): 43190550310761

## 2018-07-05 NOTE — PN
PROGRESS NOTE



DATE OF SERVICE:

07/04/2018



REASON FOR FOLLOWUP:

Candidemia, possible abdominal source.



INTERVAL HISTORY:

The patient is afebrile.  He has been breathing comfortably.  The patient denies having

any chest pain or shortness of breath.  No cough.  No abdominal pain.  Has been

tolerating his diet.



PHYSICAL EXAMINATION:

On examination, blood pressure is 133/71 with a pulse of 72, temperature 97.8. He is

97% on room air.

General description is a middle aged male lying in bed in no distress.

RESPIRATORY SYSTEM:  Unlabored breathing, clear to auscultation anteriorly.

HEART:  S1, S2.  Regular rate and rhythm.

ABDOMEN:  Soft, no tenderness.



LABS:

Hemoglobin is 10.8, white count 5.1.  Blood cultures 7/1 came back positive with

Candida parapsilosis.



DIAGNOSTIC IMPRESSION AND PLAN:

Patient with candidemia with source possible abdominal versus PICC line. Though the

PICC line culture has been negative so far.  Blood culture repeat this morning will be

followed and if those are negative, he will be able to finish therapy with oral

Diflucan for at least 2 weeks from his negative blood cultures. All his questions have

been answered.





MMODL / IJN: [id number] / Job#: [id number]

## 2018-07-05 NOTE — P.OP
Date of Procedure: 06/25/18


Description of Procedure: 








Date of Procedure: 06/25/18


Procedure(s) Performed: 





SURGEON:  NUNU JENKINS MD


PREOPERATIVE DIAGNOSES:  


1. 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


2. Inadequate protein intake


3. Ischemic cardiomyopathy


4. History of coronary artery stent placement


5. History of myocardial infarction


6. Gastroesophageal reflux disease


7. Hyperlipidemia


8. Iron deficiency anemia


9.  Chronic antiplatelet therapy





POSTOPERATIVE DIAGNOSES:


1. 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


2. Inadequate protein intake


3. Ischemic cardiomyopathy


4. History of coronary artery stent placement


5. History of myocardial infarction


6. Gastroesophageal reflux disease


7. Hyperlipidemia


8. Iron deficiency anemia


9. 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


10. 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


11. Sigmoid diverticulosis


12. Diffuse large and small bowel abdominal distention


13.  Chronic antiplatelet therapy





Anesthesia: GETA, local, epidural


Assistant #1: Ana M Mcgarry


Estimated Blood Loss (ml): 100


Pathology: other (Sigmoid tumor, suture proximal resection)


Condition: stable


Disposition: floor


COMPLICATIONS: None. 





OPERATION: 


1.  Diagnostic laparoscopy converted to open


2.  Small bowel decompression via enterotomy along the distal jejunum


3.  Sigmoid colectomy involving large proximal sigmoid tumor 


4.  Descending colostomy creation with devitalized rectum, Galvez's procedure


5.  Peritoneal lavage 1 L


6.  Application of wound VAC, Prevena 25-cm





FINDINGS: 


1.  Initial diagnostic laparoscopy demonstrating severe small bowel distention 

prohibiting view of pathology.


2.  Initial laparoscopic trocar entry along the left upper quadrant followed by 

above the umbilicus


3.  Preoperatively, peak airway pressures over 40 mmHg reduced immediately down 

to 18 mmHg after abdominal decompression


4.  Small bowel dilation over 5-1/2 cm requiring small bowel decompression via 

pursestring enterotomy of the distal jejunum, over 2 L of enteric contents and 

air relieved from abdomen


5.  Proximal sigmoid tumor creating pinpoint for sigmoid volvulus with adhesion 

to the right lateral pelvis excised using LigaSure


6.  No active sigmoid diverticulitis identified with diverticulosis of the 

sigmoid colon


7.  Moderately redundant sigmoid colon allowing sigmoid resection and 

descending colostomy creation


8.  Rectal stump tagged using 0 Prolene


9.  Case contaminated and will require prolonged antibiotics


10.  Wound VAC placed along the mid abdomen to minimize risk of surgical site 

infection





INDICATIONS: Ruel Franco is a 58-year-old gentleman who presented to the 

hospital over one week ago with findings of diffuse abdominal distention.  

Initial evaluation was consistent with ileus and the patient was treated with 

gastric decompression.  Multiple imaging studies including at least 4 CT scans 

were obtained in less than a 48 hour period demonstrating a questionable lesion 

along the sigmoid colon versus diffuse ileus.  Despite conservative measures, 

the patient's abdominal distention rebounded.  His surgical intervention was 

deferred as he was on antiplatelet therapy with Plavix.  A diagnostic 

laparoscopy was proposed for his equivocal diagnosis.  Diagnostic laparoscopy 

with the intent of lysis of adhesionswas agreed per request of the patient.  

Intraoperative findings demonstrated a tumor of the sigmoid colon.  As the 

patient did not want a colostomy at the time of operation, this procedure was 

deferred  as a staged approach for potential bowel prep and robotic-assisted 

approach.  An attempted bowel prep was performed however failed.  Potential 

colostomy creation was described in detail which the patient had agreed for 

definitive surgical intervention.  Benefits and risks of the operation 

including possible open technique were reviewed.  Patient agreed to proceed 

with surgery.





All questions were answered and risks were reviewed with the patient including 

his


wife at bedside. Informed consent was obtained. 





DESCRIPTION: Patient was brought to the operating room after an epidural


had been placed. He was previously hydrated.  He is on scheduled IV antibiotics 

and TPN.





The patient was placed in supine position whereby general induction was


performed. Abdomen had been prepped and draped in the standard sterile


fashion with placement of nasogastric tube and Londono catheter was previously 

present. Ioban


draping was also placed to minimize any contamination to the skin. 





Using a #11 blade, a 5 mm laparoscopic trocar entry was performed along the 

left upper quadrant.  The abdomen was insufflated to 15 mmHg pressure.  

Diagnostic laparoscopy demonstrated no injury to bowel, viscera or mesentery. 

The small bowel was moderately distended. Along his previous incision above the 

umbilicus, a trocar was similarly placed.  Despite steep Trendelenberg position

, the pathology of the sigmoid colon could not be adequately visualized hence 

open procedure was proposed.





I scrubbed out of the case to discuss the findings with patient's wife and 

going open technique.





Next, using #10 blade, the abdomen was entered along the midline whereby an 

incision was made just above the umbilicus down to the


pubis. The peritoneum was entered. Next, self-retaining Ankush retractor was 

placed with a bladder blade.  To address his severe distention of the small 

bowel, a pursestring suture is placed along the distal jejunum using 3-0 silk.  

The abdomen was toweled off.  A suction device was entered into the lumen of 

the jejunum after making an enterotomy.  More than 2 L of bilious contents 

including gas was evacuated from the small bowel hence allowing visualization 

of the rest of the pelvis.  The enterotomy was closed using Allis clamps and 

Covidien tri-stapler 60 mm purple load.





Inspection of the abdomen demonstrated no evidence of peritoneal studding or 

lesions along the surface of the liver.  An active sigmoid volvulus was 

confirmed with the fixation point of a proximal sigmoid tumor along the right 

deep pelvis.





The descending colon was also mobilized along the white line of Toldt. The 

sigmoid colon was


mobilized along the medial and lateral attachments with care to avoid


any injury to the ureters along the usual anatomical landmarks.





The fixation point along the right deep pelvis was resected using LigaSure.  

The pathology incorporated 6 cm x 4 cm of the proximal sigmoid colon with 

hypervascularity of the serosa.  The tumor was firm. A colonic tumor could not 

be excluded.  The pathology was tacked 5 cm proximally and distally for 

resection using the Covidien tri-stapler 60 mm black loads.  The mesentery was 

mobilized along the pelvis.  Hemostasis was excellent throughout the case.  

Minimal contamination occurred throughout the case.  The devitalized rectum was 

checked using 0 Prolene.





The descending colon was prepared for maturation of a colostomy. Next, the


rest of the colon was mobilized down to the rectum. Next, attention was brought 

to


delivering and creating the descending colostomy. A point along the


abdominal wall and rectus muscle was selected for his colostomy. Kocher


was used to elevate the skin and a #10 blade was taken across in


tangential manner to create the skin defect of approximately


quarter-size. The fat of the skin was mobilized using a small rich. The


rectus muscle was identified and scored with a cruciate scoring of


electro- Bovie cautery. Next, using a muscle-splitting technique with a


hemostat, the peritoneum was entered. The peritoneum was widened such


that 2 fingerbreadths could easily pass for delivering and evaginating


the descending portion of the colon through the skin. 





The abdominal cavity was copiously irrigated using 1 liters of warm


normal saline solution until completely clear and dry.  





The midline incision was closed using double-stranded 0 PDS. Next, the 

subcutaneous


tissue was copiously irrigated with normal saline and dilute hydrogen peroxide. 


About the umbilicus interrupted 3-0 Vicryl dermal sutures were placed as to 

avoid


any staples around the umbilicus. For the rest of the incision,


interrupted dermal sutures of 3-0 Vicryl were placed. 





A Prevena 25-cm wound VAC was applied along the midline.





The midline incision was covered and attention was brought to maturation


of the colostomy. The staple edge was divided and removed. Next quadrant


sutures at 12 o'clock, 3 o'clock, 6 o'clock, and 9 o'clock position was


made using serosa, mucosal and  dermal bites using 2-0 Vicryl.


Interrupted 3-0 Vicryl was placed in between all quadrants sutures to 

completely mature the ostomy.


The stoma had an elevation of at least 1 centimeter. Hemostasis was


checked. A Coloplast was then placed. 





All incisions and hemostasis was checked.  





At the end of the procedure, needle, sponge, and instrument count had


been verified correct by surgical technician. 





The patient's family was updated on level of care.

## 2018-07-05 NOTE — PN
PROGRESS NOTE



DATE OF SERVICE:

07/05/2018



REASON FOR FOLLOWUP:

Candida parapsilosis secondary to possible abdominal source.



INTERVAL HISTORY:

The patient is afebrile.  He has been breathing comfortably.  Patient denies

significant chest pain or cough.  Denies having any abdominal pain.  No nausea,

vomiting or any diarrhea.



PHYSICAL EXAMINATION:

His blood pressure is 136/79 with a pulse of 75, temperature 98.1. He is 99% on room

air.

General description is a middle-aged male lying in bed in no distress.

RESPIRATORY SYSTEM: Unlabored breathing. Clear to auscultation anteriorly.

HEART: S1, S2.  Regular rate and rhythm.

ABDOMEN: Soft. No tenderness.



LABS:

Hemoglobin is 10.3, white count 5.1 with a BUN of 4, creatinine 0.65.



DIAGNOSTIC IMPRESSION AND PLAN:

Patient with Candidemia parapsilosis candidemia secondary to possible abdominal source.

Patient at this time will continue with IV Diflucan. That will be transitioned to p.o.

Diflucan 4 mg daily for another 2 weeks with close outpatient followup along with a

short course of oral Augmentin.  The blood cultures that were done on 07/04 remain

negative _____tomorrow morning.  Plan of care was discussed with the attending

physician.  Continue with supportive care.





MMODL / IJN: [id number] / Job#: [id number]

## 2018-07-05 NOTE — PN
PROGRESS NOTE



DATE OF SERVICE:

07/05/2018



PRESENTING COMPLAINT:

Bowel surgery.



INTERVAL HISTORY:

Patient is status post bowel surgery, intra-abdominal fecal contamination from

perforated diverticulitis.  Blood culture has been positive for fungal elements.

Awaiting cultures to be negative.  Otherwise, patient tolerating a diet.  Colostomy is

functioning well.  Ambulatory.  Pain is controlled.



REVIEW OF SYSTEMS:

Done for constitutional, cardiovascular, GI, pulmonary; relevant findings as above.



CURRENT MEDICATIONS:

Reviewed that include:

1. IV Diflucan and.

2. IV Zosyn.



EXAMINATION:

Temperature 98.1, pulse 75, respirations 16, blood pressure 136/69, pulse ox 99% on

room air.

GENERAL APPEARANCE:  Sitting up, comfortable.

EYES:  Pupils equal.  Conjunctivae normal.

HEENT:  External nose and ears normal.  Oral cavity normal.

NECK:  JVD not raised.  Mass not palpable.

RESPIRATORY:  Effort normal.

LUNGS:  Decreased breath sounds.

CARDIOVASCULAR:  First and second sounds normal.  No edema.

ABDOMEN:  Soft, nontender.  Colostomy bag in place.  Stool in place.

PSYCHIATRY:  Alert and oriented x3.  Mood and affect normal.



INVESTIGATIONS:

The patient's PICC line tip from 07/02/2018has been negative and blood cultures from

07/01/2018 did continue to grow Candida parapsilosis.



ASSESSMENT:

1. Acute sigmoid diverticulitis with perforation with secondary peritonitis and fecal

    soiling.

2. Fungemia with blood cultures positive for Candida parapsilosis.  Blood cultures

    from 07/01/2018 were positive.  Repeat blood cultures from 07/04/2018 and

    07/05/2018 are pending.

3. Chronic alcohol use.

4. Coronary artery disease with stent to the right coronary artery and diffuse disease

    to the left anterior descending.

5. Acute renal failure prerenal/acute tubular necrosis, resolved.

6. Hyperkalemia, improved.

7. Hypoalbuminemia as an acute phase reactant.

8. Sigmoid colectomy with a colostomy bag in place.



PLAN:

Continue current medication and treatment plan.  Awaiting repeat fungal blood cultures

to be negative.  Discussed with Dr. Arango.  According to him, patient can be discharged

tomorrow on Diflucan.





MMODL / IJN: [id number] / Job#: [id number]

## 2018-07-06 VITALS — SYSTOLIC BLOOD PRESSURE: 151 MMHG | TEMPERATURE: 97.5 F | HEART RATE: 93 BPM | DIASTOLIC BLOOD PRESSURE: 94 MMHG

## 2018-07-06 RX ADMIN — DEXTROSE MONOHYDRATE SCH MLS/HR: 5 INJECTION, SOLUTION INTRAVENOUS at 08:00

## 2018-07-06 RX ADMIN — METOPROLOL TARTRATE SCH MG: 25 TABLET, FILM COATED ORAL at 07:59

## 2018-07-06 RX ADMIN — ASPIRIN 81 MG CHEWABLE TABLET SCH MG: 81 TABLET CHEWABLE at 08:00

## 2018-07-06 RX ADMIN — ATORVASTATIN CALCIUM SCH MG: 80 TABLET, FILM COATED ORAL at 07:59

## 2018-07-06 RX ADMIN — PANTOPRAZOLE SODIUM SCH MG: 40 TABLET, DELAYED RELEASE ORAL at 07:59

## 2018-07-06 RX ADMIN — TAMSULOSIN HYDROCHLORIDE SCH MG: 0.4 CAPSULE ORAL at 08:00

## 2018-07-06 RX ADMIN — HEPARIN SODIUM SCH UNIT: 5000 INJECTION, SOLUTION INTRAVENOUS; SUBCUTANEOUS at 08:00

## 2018-07-06 RX ADMIN — HYDROCODONE BITARTRATE AND ACETAMINOPHEN PRN EACH: 7.5; 325 TABLET ORAL at 03:15

## 2018-07-06 NOTE — P.PN
Subjective


Progress Note Date: 07/06/18





58-year-old male seen and examined sitting up in bed.  Patient states he's 

anxious to be discharged home hopefully today.  Ostomy functioning moderate 

amount of stool in the ostomy bag.  Nursing reports that the surgical dressing 

"needed to be changed last night there was a moderate amount of drainage noted 

current surgical dressing dry.  Urinating no difficulty.  Abdomen not distended 

nontender no redness noted to the abdominal wall








Status post June 25th diagnostic arthroscopic converted to open small bowel 

decompression sigmoid colectomy involving a large proximal sigmoid tumor, 

descending colostomy creation Lacy's procedure with application of prevena 

wound system











Objective





- Vital Signs


Vital signs: 


 Vital Signs











Temp  97.5 F L  07/06/18 05:45


 


Pulse  93   07/06/18 05:45


 


Resp  16   07/06/18 05:45


 


BP  151/94   07/06/18 05:45


 


Pulse Ox  98   07/06/18 05:45








 Intake & Output











 07/05/18 07/06/18 07/06/18





 18:59 06:59 18:59


 


Output Total 300  


 


Balance -300  


 


Weight 71.6 kg 70.8 kg 


 


Output:   


 


  Urine 300  


 


Other:   


 


  Voiding Method Toilet  





 Urinal  


 


  # Voids 1 1 














- Exam





Physical exam


58-year-old male sitting up in bed taking a diet


Lungs clear on room air no shortness of breath no cough noted


Heart S1-S2 audible regular


Abdomen soft surgical dressing dry ostomy left lower quadrant monitor mold 

liquid stool the day surgical tenderness appropriate urinating no difficulty 

tolerating diet no nausea no vomiting


Extremities no edema noted





- Labs


CBC & Chem 7: 


 07/03/18 07:46





 07/01/18 07:03


Labs: 


 Microbiology - Last 24 Hours (Table)











 07/01/18 23:22 Blood Culture Gram Stain - Final





 Blood Blood Culture - Final





    Candida parapsilosis


 


 07/01/18 22:28 Blood Culture - Preliminary





 Blood    No Growth after 96 hours


 


 07/05/18 09:53 Blood Fungal Culture - Preliminary





 Blood 


 


 07/04/18 07:43 Blood Culture - Preliminary





 Blood    No Growth after 24 hours














Assessment and Plan


Assessment: 





Impression


Present on admission abdominal pain nausea vomiting suspect due to partial 

bowel obstruction involving small and large bowel


CAT scan abdomen and pelvis obtained on admission report indicates mild acute 

diverticulitis mid to distal sigmoid colon causing partial small bowel 

obstruction involving small large bowel


Known coronary artery disease with prior coronary stenting done October 2017 on 

dual antiplatelet therapy Plavix and aspirin


History of tobacco abuse recently quit


A recent colonoscopy to the sigmoid colon with flexible sigmoidoscopy done 

February 2018 showed scattered diverticulosis of the sigmoid colon with severe 

torturous sigmoid colon with no evidence of an active bleed as part of a workup 

for acute blood loss anemia


A recent EGD February 2018 no evidence of an active duodenitis, no duodenal 

ulcer: Gastric ulcer


Abdominal x-ray done on the 18th findings are suggestive of severe ileus 


CAT scan abdomen and pelvis:colon Collapse with severely dilated small bowel


History of chronic daily alcohol use with no evidence of impending withdrawals


Status post June 25 diagnostic laparoscopic converted to open, small bowel 

decompression, sigmoid colectomy involving the large proximal sigmoid tumor, 

descending ostomy creation Lacy's procedure with peritoneal lavage 1 L, 

application of prevena wound system


Febrile persist improved


Pathology report from the sigmoid showed no evidence of a malignancy


Bacteremia positive blood culture for candida parapsilosis





Plan


Okay to proceed with a discharge from surgical service defer to the timing to 

the attending and infectious disease


Follow-up with surgical service outpatient setting


Infectious disease  recommendations antibiotic oral Diflucan for 2 weeks if 

blood cultures that have been repeated are negative


DVT and GI prophylaxis





 











Progress note dictated for Dr. Desai 








The above impression and plan of care have been discussed and directed by 

signing physician. Marlee Simon nurse practitioner acting as scribe for signing 

physician.

## 2018-07-06 NOTE — PN
PROGRESS NOTE



DATE OF SERVICE:

07/06/2018



REASON FOR FOLLOWUP:

1. Candida parapsilosis candidemia.

2. Patient with secondary peritonitis.



INTERVAL HISTORY:

The patient is afebrile, he is breathing comfortably.  Denies having any chest pain or

shortness of breath.  No cough, no abdominal pain, no nausea, or vomiting.



PHYSICAL EXAMINATION:

Blood pressure 151/94 with a pulse of 90, temperature 97.5, he is 99% on room air.

General description is a middle-aged male, lying in bed in no distress.

RESPIRATORY SYSTEM:  Unlabored breathing, clear to auscultation.

HEART: S1, S2.  Regular rate and rhythm.

ABDOMEN:  Soft, no tenderness.



LABS:

Hemoglobin 10.8, white count of 5.1 with a _____.  His blood culture so for has been

negative.



DIAGNOSTIC IMPRESSION AND PLAN:

Patient Candida parapsilosis candidemia/possible abdominal peritonitis.  Follow up

blood culture has been negative.  Antibiotic has been changed to oral Diflucan for

minimal daily for another 2 weeks with close outpatient followup, initially with oral

Augmentin.  Script has been sent to the pharmacy with close outpatient followup.





MMODL / IJN: [id number] / Job#: [id number]

## 2018-07-07 NOTE — DS
DISCHARGE SUMMARY



DATE OF ADMISSION:

06/14/2018.



DATE OF DISCHARGE:

July 6, 2018.



FINAL DIAGNOSES:

1. Acute sigmoid diverticulitis with perforation with secondary peritonitis and fecal

    swelling.

2. Fungemia with positive blood cultures positive for Candida _____.  Last set of

    blood cultures were positive from July 1, 2018.

3. Chronic alcohol use.

4. Coronary artery disease stent to the RCA and diffuse disease to LAD.

5. Acute renal failure prerenal acute tubular necrosis, resolved.

6. Hyperkalemia improved.

7. Hypoalbuminemia as an acute phase reactant.



PROCEDURE:

Sigmoid colectomy with colostomy bag in place.



CONSULTATIONS:

Dr. Arango from Infectious Disease, Dr. Desai from General surgery.



HOSPITAL COURSE:

This patient presented with bowel obstruction.  Did not improve conservatively.  Did

undergo surgical repair as above.  The patient's pathology report was compatible with

the diagnoses above.  The patient's blood cultures did come back positive and the last

set of blood cultures were negative from July 4, 2018.  The patient was seen by Dr. Arango, who okayed the patient to be discharged.  The patient has been tolerating a

diet.  Colostomy is working well.



EXAM:

Lungs decreased breath sounds.  Cardiovascular:  1st and 2nd sounds.  Abdomen soft,

nontender.



DISCHARGE MEDICATIONS:

1. Aspirin 81 mg a day.

2. Vitamin D3 2000 units p.o. daily.

3. Nitrostat 0.4 sublingual q.5 p.r.n.

4. Lipitor 80 mg daily.

5. Lopressor 25 mg p.o. daily.

6. Prilosec 20 mg with breakfast.

7. Norco 7.5 q.4h p.r.n.

8. Augmentin 875 1 tab q.12h 20 tablets.

9. Diflucan 400 mg p.o. daily 14 tablets.

10.Flomax 0.4 mg p.o. daily.

Discontinued medications include:  Plavix per Cardiology.



FOLLOWUP:

Follow up with Dr. KIKA Sanofrd in 2 weeks, Dr. Desai on July 17, 2018;  Dr. Reddy Chung on July 12, 2018; Dr. Arango on July 16, 2018.



Colostomy care per [facility].



Copy to Dr. Reddy Chung.





MMODL / IJN: [id number] / Job#: [id number]

## 2018-09-05 ENCOUNTER — HOSPITAL ENCOUNTER (OUTPATIENT)
Dept: HOSPITAL 47 - ORWHC2ENDO | Age: 58
Discharge: HOME | End: 2018-09-05
Payer: COMMERCIAL

## 2018-09-05 VITALS — SYSTOLIC BLOOD PRESSURE: 113 MMHG | RESPIRATION RATE: 18 BRPM | DIASTOLIC BLOOD PRESSURE: 77 MMHG | HEART RATE: 63 BPM

## 2018-09-05 VITALS — TEMPERATURE: 98.6 F

## 2018-09-05 VITALS — BODY MASS INDEX: 20.9 KG/M2

## 2018-09-05 DIAGNOSIS — Z93.3: ICD-10-CM

## 2018-09-05 DIAGNOSIS — Z79.899: ICD-10-CM

## 2018-09-05 DIAGNOSIS — Z79.82: ICD-10-CM

## 2018-09-05 DIAGNOSIS — K57.30: Primary | ICD-10-CM

## 2018-09-05 DIAGNOSIS — I25.10: ICD-10-CM

## 2018-09-05 DIAGNOSIS — Z87.442: ICD-10-CM

## 2018-09-05 DIAGNOSIS — I25.2: ICD-10-CM

## 2018-09-05 DIAGNOSIS — Z87.891: ICD-10-CM

## 2018-09-05 DIAGNOSIS — Z79.02: ICD-10-CM

## 2018-09-05 PROCEDURE — 44388 COLONOSCOPY THRU STOMA SPX: CPT

## 2018-09-05 NOTE — P.PCN
Date of Procedure: 09/05/18


Description of Procedure: 





PREOPERATIVE DIAGNOSIS:


History of ruptured diverticulitis


Descending colostomy status





POSTOPERATIVE DIAGNOSIS:


History of ruptured diverticulitis


Descending colostomy status


Sigmoid diverticulosis





OPERATION:


Colonoscopy through descending colostomy to appendiceal orifice.


Colostomy through rectum





SURGEON: Eliz Desai MD.





ANESTHESIA: MAC.





INDICATIONS:


The patient is a 58-year-old male who presents for colonoscopy following 

perforated diverticulitis and descending colostomy.  He has not had any prior 

full colonoscopy screenings.  Benefits and risks were described and informed 

consent was obtained.





DESCRIPTION OF PROCEDURE:


The patient had undergone Gatorade, MiraLAX and Dulcolax prep. He had been 

brought into the operating room and laid supine.  The stoma appliance was 

removed.  An Olympus colonoscope was advanced through the descending colostomy 

to the appendiceal orifice. The prep was excellent with clear visualization of 

the mucosal folds.   The scope was removed with visualization of each mucosal 

fold.  Scattered diverticulosis was encountered of the descending colon and 

ascending colon.  No colonic polyps were found.  No evidence of focal colitis 

was found. The colon was desufflated.  





The patient was transitioned in the left lateral decubitus position where along 

the rectum,  no palpable tumors were identifie. The Olympus colonoscope was 

advanced to 25 cm from the anal verge.  A few scattered diverticulosis without 

diverticulitis was identified.  His stoma appliance was applied.  





The patient had tolerated the procedure well. 





Withdrawal time was over 6 minutes.





FINDINGS:


No arteriovenous malformations.


No adenomatous polyps.


No focal colitis.


Few scattered diverticulosis along the remnant sigmoid colon and descending 

colon





RECOMMENDATIONS:


Lower endoscopy in 5 years, 2023.





Plan - Discharge Summary


New Discharge Prescriptions: 


No Action


   Cholecalciferol (Vitamin D3) [Vitamin D3] 2,000 unit PO QAM


   Nitroglycerin Sl Tabs [Nitrostat] 0.4 mg SUBLINGUAL Q5M PRN #25 tab


     PRN Reason: Chest Pain


   Metoprolol Tartrate [Lopressor] 25 mg PO QAM


   Atorvastatin [Lipitor] 80 mg PO QAM


   HYDROcodone/APAP 7.5-325MG [Norco 7.5-325] 1 tab PO Q4H PRN 3 Days #18 tab


     PRN Reason: pain


   Clopidogrel [Plavix] 75 mg PO DAILY


   Aspirin [Adult Low Dose Aspirin EC] 81 mg PO DAILY


Discharge Medication List





Cholecalciferol (Vitamin D3) [Vitamin D3] 2,000 unit PO QAM 10/23/17 [History]


Nitroglycerin Sl Tabs [Nitrostat] 0.4 mg SUBLINGUAL Q5M PRN #25 tab 10/25/17 [Rx

]


Atorvastatin [Lipitor] 80 mg PO QAM 12/08/17 [History]


Metoprolol Tartrate [Lopressor] 25 mg PO QAM 12/08/17 [History]


HYDROcodone/APAP 7.5-325MG [Norco 7.5-325] 1 tab PO Q4H PRN 3 Days #18 tab 07/05 /18 [Rx]


Aspirin [Adult Low Dose Aspirin EC] 81 mg PO DAILY 08/31/18 [History]


Clopidogrel [Plavix] 75 mg PO DAILY 08/31/18 [History]

## 2018-09-05 NOTE — P.GSHP
History of Present Illness


H&P Date: 09/05/18











CHIEF COMPLAINT: History of diverticulitis





HISTORY OF PRESENT ILLNESS: The patient is a 58-year-old male who


presents with diverticulitis and ostomy.  Lower endoscopy was offered for 

further evaluation and management.





PAST MEDICAL HISTORY: 


Please see list.





PAST SURGICAL HISTORY: 


Please see list.





MEDICATIONS: 


Please see list.





ALLERGIES:  Please see list. 





SOCIAL HISTORY: No illicit drug use





FAMILY HISTORY: No reports of Crohn disease or ulcerative colitis. 





REVIEW OF ORGAN SYSTEMS: 


CONSTITUTIONAL: No reports of fevers or chills. No reports of weight


loss despite prior attempts. 


GI:  Has diarrhea including change in bowel habits. 





PHYSICAL EXAM: 


VITAL SIGNS:  Stable


GENERAL: Well-developed pleasant male in no acute distress. 


HEENT: No scleral icterus. Extraocular movements grossly


intact. Moist buccal mucosa. 


NECK: Supple without lymphadenopathy. 


CHEST: Unlabored respirations. Equal bilateral excursions. 


CARDIOVASCULAR: Regular rate and rhythm. Distal 2+ pulses. 


ABDOMEN: Soft, nontender, nondistended. Ostomy patent


MUSCULOSKELETAL: No clubbing, cyanosis, or edema. 





ASSESSMENT: 


1.  History of diverticulitis


2.  Descending colostomy





PLAN: 


1. Recommend proceeding with a lower endoscopy





Past Medical History


Past Medical History: Coronary Artery Disease (CAD), Cancer, Chest Pain / Angina

, GI Bleed, Myocardial Infarction (MI)


Additional Past Medical History / Comment(s): pt had bowel tumor with resection 

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 

passed on his own.


Last Myocardial Infarction Date:: 10/23/17


History of Any Multi-Drug Resistant Organisms: None Reported


Past Surgical History: Bowel Resection, Heart Catheterization With Stent, 

Tonsillectomy


Additional Past Surgical History / Comment(s): 2/2018 EGD/colonoscopy, skin 

cancer removal, 3 stents on 10/23/17, 12/12/17 cardiac cath with maximizing 

medical therapy.


Past Anesthesia/Blood Transfusion Reactions: No Reported Reaction


Date of Last Stent Placement:: 12/12/17


Smoking Status: Former smoker





- Past Family History


  ** Mother


Family Medical History: CVA/TIA





  ** Father


Family Medical History: Cancer


Additional Family Medical History / Comment(s): pancreatic





Medications and Allergies


 Home Medications











 Medication  Instructions  Recorded  Confirmed  Type


 


Cholecalciferol (Vitamin D3) 2,000 unit PO QAM 10/23/17 08/31/18 History





[Vitamin D3]    


 


Nitroglycerin Sl Tabs [Nitrostat] 0.4 mg SUBLINGUAL Q5M PRN #25 tab 10/25/17 08/

31/18 Rx


 


Atorvastatin [Lipitor] 80 mg PO QAM 12/08/17 08/31/18 History


 


Metoprolol Tartrate [Lopressor] 25 mg PO QAM 12/08/17 08/31/18 History


 


HYDROcodone/APAP 7.5-325MG [Norco 1 tab PO Q4H PRN 3 Days #18 tab 07/05/18 08/31 /18 Rx





7.5-325]    


 


Acetaminophen Tab [Tylenol] 650 mg PO Q6HR PRN  tab 07/06/18 08/31/18 Rx


 


Aspirin [Adult Low Dose Aspirin EC] 81 mg PO DAILY 08/31/18 08/31/18 History


 


Clopidogrel [Plavix] 75 mg PO DAILY 08/31/18 08/31/18 History


 


Omeprazole [PriLOSEC] 20 mg PO AC-BRKFST PRN 08/31/18 08/31/18 History











 Allergies











Allergy/AdvReac Type Severity Reaction Status Date / Time


 


No Known Allergies Allergy   Verified 08/31/18 08:48

## 2018-09-20 ENCOUNTER — HOSPITAL ENCOUNTER (OUTPATIENT)
Dept: HOSPITAL 47 - LABPAT | Age: 58
End: 2018-09-20
Payer: COMMERCIAL

## 2018-09-20 DIAGNOSIS — K57.30: Primary | ICD-10-CM

## 2018-09-20 LAB
ALBUMIN SERPL-MCNC: 3.9 G/DL (ref 3.5–5)
ALP SERPL-CCNC: 74 U/L (ref 38–126)
ALT SERPL-CCNC: 113 U/L (ref 21–72)
ANION GAP SERPL CALC-SCNC: 10 MMOL/L
AST SERPL-CCNC: 96 U/L (ref 17–59)
BUN SERPL-SCNC: 11 MG/DL (ref 9–20)
CALCIUM SPEC-MCNC: 9.6 MG/DL (ref 8.4–10.2)
CHLORIDE SERPL-SCNC: 106 MMOL/L (ref 98–107)
CO2 SERPL-SCNC: 23 MMOL/L (ref 22–30)
ERYTHROCYTE [DISTWIDTH] IN BLOOD BY AUTOMATED COUNT: 4.44 M/UL (ref 4.3–5.9)
ERYTHROCYTE [DISTWIDTH] IN BLOOD: 14.3 % (ref 11.5–15.5)
GLUCOSE SERPL-MCNC: 105 MG/DL (ref 74–99)
HCT VFR BLD AUTO: 42.3 % (ref 39–53)
HGB BLD-MCNC: 14 GM/DL (ref 13–17.5)
MCH RBC QN AUTO: 31.5 PG (ref 25–35)
MCHC RBC AUTO-ENTMCNC: 33 G/DL (ref 31–37)
MCV RBC AUTO: 95.3 FL (ref 80–100)
PLATELET # BLD AUTO: 331 K/UL (ref 150–450)
POTASSIUM SERPL-SCNC: 4.3 MMOL/L (ref 3.5–5.1)
PROT SERPL-MCNC: 7.3 G/DL (ref 6.3–8.2)
SODIUM SERPL-SCNC: 139 MMOL/L (ref 137–145)
WBC # BLD AUTO: 6.3 K/UL (ref 3.8–10.6)

## 2018-09-20 PROCEDURE — 86901 BLOOD TYPING SEROLOGIC RH(D): CPT

## 2018-09-20 PROCEDURE — 85027 COMPLETE CBC AUTOMATED: CPT

## 2018-09-20 PROCEDURE — 86850 RBC ANTIBODY SCREEN: CPT

## 2018-09-20 PROCEDURE — 80053 COMPREHEN METABOLIC PANEL: CPT

## 2018-09-20 PROCEDURE — 86900 BLOOD TYPING SEROLOGIC ABO: CPT

## 2018-09-20 PROCEDURE — 36415 COLL VENOUS BLD VENIPUNCTURE: CPT

## 2018-10-01 ENCOUNTER — HOSPITAL ENCOUNTER (INPATIENT)
Dept: HOSPITAL 47 - 2ORMAIN | Age: 58
LOS: 1 days | Discharge: HOME | DRG: 331 | End: 2018-10-02
Payer: COMMERCIAL

## 2018-10-01 VITALS — BODY MASS INDEX: 21.2 KG/M2

## 2018-10-01 DIAGNOSIS — I11.9: ICD-10-CM

## 2018-10-01 DIAGNOSIS — Z79.02: ICD-10-CM

## 2018-10-01 DIAGNOSIS — Z95.5: ICD-10-CM

## 2018-10-01 DIAGNOSIS — Z79.82: ICD-10-CM

## 2018-10-01 DIAGNOSIS — I25.2: ICD-10-CM

## 2018-10-01 DIAGNOSIS — Z87.891: ICD-10-CM

## 2018-10-01 DIAGNOSIS — D50.9: ICD-10-CM

## 2018-10-01 DIAGNOSIS — I25.10: ICD-10-CM

## 2018-10-01 DIAGNOSIS — Z43.3: Primary | ICD-10-CM

## 2018-10-01 DIAGNOSIS — Z85.828: ICD-10-CM

## 2018-10-01 LAB
ALBUMIN SERPL-MCNC: 4.3 G/DL (ref 3.5–5)
ALP SERPL-CCNC: 57 U/L (ref 38–126)
ALT SERPL-CCNC: 46 U/L (ref 21–72)
ANION GAP SERPL CALC-SCNC: 10 MMOL/L
AST SERPL-CCNC: 35 U/L (ref 17–59)
BASOPHILS # BLD AUTO: 0.1 K/UL (ref 0–0.2)
BASOPHILS NFR BLD AUTO: 1 %
BUN SERPL-SCNC: 10 MG/DL (ref 9–20)
CALCIUM SPEC-MCNC: 9.8 MG/DL (ref 8.4–10.2)
CHLORIDE SERPL-SCNC: 107 MMOL/L (ref 98–107)
CO2 SERPL-SCNC: 22 MMOL/L (ref 22–30)
EOSINOPHIL # BLD AUTO: 0.1 K/UL (ref 0–0.7)
EOSINOPHIL NFR BLD AUTO: 2 %
ERYTHROCYTE [DISTWIDTH] IN BLOOD BY AUTOMATED COUNT: 4.74 M/UL (ref 4.3–5.9)
ERYTHROCYTE [DISTWIDTH] IN BLOOD: 14.1 % (ref 11.5–15.5)
GLUCOSE SERPL-MCNC: 78 MG/DL (ref 74–99)
HCT VFR BLD AUTO: 44.6 % (ref 39–53)
HGB BLD-MCNC: 14.7 GM/DL (ref 13–17.5)
LYMPHOCYTES # SPEC AUTO: 1.8 K/UL (ref 1–4.8)
LYMPHOCYTES NFR SPEC AUTO: 35 %
MCH RBC QN AUTO: 31 PG (ref 25–35)
MCHC RBC AUTO-ENTMCNC: 33 G/DL (ref 31–37)
MCV RBC AUTO: 94.1 FL (ref 80–100)
MONOCYTES # BLD AUTO: 0.5 K/UL (ref 0–1)
MONOCYTES NFR BLD AUTO: 9 %
NEUTROPHILS # BLD AUTO: 2.7 K/UL (ref 1.3–7.7)
NEUTROPHILS NFR BLD AUTO: 52 %
PLATELET # BLD AUTO: 289 K/UL (ref 150–450)
POTASSIUM SERPL-SCNC: 4.7 MMOL/L (ref 3.5–5.1)
PROT SERPL-MCNC: 7.8 G/DL (ref 6.3–8.2)
SODIUM SERPL-SCNC: 139 MMOL/L (ref 137–145)
WBC # BLD AUTO: 5.2 K/UL (ref 3.8–10.6)

## 2018-10-01 PROCEDURE — 0WQF0ZZ REPAIR ABDOMINAL WALL, OPEN APPROACH: ICD-10-PCS

## 2018-10-01 PROCEDURE — 86850 RBC ANTIBODY SCREEN: CPT

## 2018-10-01 PROCEDURE — 0DQM0ZZ REPAIR DESCENDING COLON, OPEN APPROACH: ICD-10-PCS

## 2018-10-01 PROCEDURE — 0DQN0ZZ REPAIR SIGMOID COLON, OPEN APPROACH: ICD-10-PCS

## 2018-10-01 PROCEDURE — 86900 BLOOD TYPING SEROLOGIC ABO: CPT

## 2018-10-01 PROCEDURE — 86901 BLOOD TYPING SEROLOGIC RH(D): CPT

## 2018-10-01 PROCEDURE — 88304 TISSUE EXAM BY PATHOLOGIST: CPT

## 2018-10-01 PROCEDURE — 80053 COMPREHEN METABOLIC PANEL: CPT

## 2018-10-01 PROCEDURE — 85025 COMPLETE CBC W/AUTO DIFF WBC: CPT

## 2018-10-01 RX ADMIN — FAMOTIDINE SCH MG: 10 INJECTION, SOLUTION INTRAVENOUS at 21:27

## 2018-10-01 RX ADMIN — DEXTROSE MONOHYDRATE, SODIUM CHLORIDE, AND POTASSIUM CHLORIDE SCH MLS/HR: 50; 4.5; 1.49 INJECTION, SOLUTION INTRAVENOUS at 21:57

## 2018-10-01 RX ADMIN — HEPARIN SODIUM SCH UNIT: 5000 INJECTION, SOLUTION INTRAVENOUS; SUBCUTANEOUS at 23:31

## 2018-10-01 RX ADMIN — METOCLOPRAMIDE SCH MG: 5 INJECTION, SOLUTION INTRAMUSCULAR; INTRAVENOUS at 23:32

## 2018-10-01 RX ADMIN — METOCLOPRAMIDE SCH MG: 5 INJECTION, SOLUTION INTRAMUSCULAR; INTRAVENOUS at 19:36

## 2018-10-01 NOTE — P.GSHP
History of Present Illness


H&P Date: 10/01/18








CHIEF COMPLAINT: History of sigmoid diverticulitis





HISTORY OF PRESENT ILLNESS: The patient is a 58-year-old male history of large 

bowel obstruction due to diverticulitis.  He had a temporary descending 

colostomy.  Now he presents for reversal.  





PAST MEDICAL HISTORY: 


Please see list.





PAST SURGICAL HISTORY: 


Please see list.





MEDICATIONS: 


Please see list.





ALLERGIES:  Please see list. 





SOCIAL HISTORY: No illicit drug use





FAMILY HISTORY: No reports of Crohn disease or ulcerative colitis. 





REVIEW OF ORGAN SYSTEMS: 


CONSTITUTIONAL:  Denies any fever or chills.  


HEENT:  Denies any trouble with vision or nosebleeds.  No difficulty 

swallowing.  


LYMPHATIC:  The patient denies any lumps and bumps around the neck. 


ENDOCRINE:  Denies any thyroid disorders. No blood sugar glucose intolerance.


RESPIRATORY:  Denies pneumonia. Denies any troubles with breathing or dyspnea 

on exertion. 


CARDIOVASCULAR:  Denies any chest pain, palpitations, or recent heart attacks.  

Last heart attack 1 year ago.


GASTROINTESTINAL:  No reports of blood in stools.


GENITOURINARY:  Has increased urinary frequency.  


MUSCULOSKELETAL:   Has back pain, stiffness, joint arthritis. 


NEUROLOGIC:  Denies any numbness or tingling along the distal extremities. No 

seizure disorders or headaches.


PSYCHIATRIC:  Denies depression or suidical ideation. 


HEMATOLOGIC:  Denies any abnormal bleeding or bruising.       





PHYSICAL EXAM: 


VITAL SIGNS:  Stable


GENERAL: Well-developed pleasant in no acute distress. 


HEENT: No scleral icterus. Extraocular movements grossly


intact. Moist buccal mucosa.  He is hard of hearing.


NECK: Supple without lymphadenopathy. 


CHEST: Unlabored respirations. Equal bilateral excursions. 


CARDIOVASCULAR: Regular rate and rhythm. Distal 2+ pulses. 


ABDOMEN: Soft, nontender, nondistended.  Ostomy pink patent and viable left 

lower quadrant


MUSCULOSKELETAL: No clubbing, cyanosis, or edema. 


NERUO: Regular 2-12 grossly intact.


PSYCH: Alert and oriented to person place and time.





ASSESSMENT: 


1.  History of previous large bowel obstruction.


2.  Sigmoid diverticulitis with obstruction


3.  Descending colostomy





PLAN: 


1.  Benefits and risks of surgical intervention of colostomy reversal was 

described.  Robotic-assisted approach was also described.


2.  He has completed an enhanced colon recovery program.


3.  DVT prophylaxis.


4.  Antibiotic prophylaxis.





Past Medical History


Past Medical History: Coronary Artery Disease (CAD), Cancer, Chest Pain / Angina

, GI Bleed, Myocardial Infarction (MI)


Additional Past Medical History / Comment(s): 6-2018 bowel obs,2/13/18 lower GI 

bleed-unknown source, blood loss anemia with transfusion, 10/23/17 stemi, skin 

cancer removal-right eyebrow, kidney stone which pt passed on his own.


Last Myocardial Infarction Date:: 10/23/17


History of Any Multi-Drug Resistant Organisms: None Reported


Past Surgical History: Bowel Resection, Heart Catheterization With Stent, 

Tonsillectomy


Additional Past Surgical History / Comment(s): benign tumor in bowel and 

colostomy-6/2018,EGD/colonoscopy, skin cancer removal, 3 stents on 10/23/17,


Past Anesthesia/Blood Transfusion Reactions: No Reported Reaction


Additional Past Anesthesia/Blood Transfusion Reaction / Comment(s): no problems 

with prior blood transfusion


Date of Last Stent Placement:: 10/23/17


Smoking Status: Former smoker





- Past Family History


  ** Mother


Family Medical History: CVA/TIA





  ** Father


Family Medical History: Cancer


Additional Family Medical History / Comment(s): pancreatic





Medications and Allergies


 Home Medications











 Medication  Instructions  Recorded  Confirmed  Type


 


Cholecalciferol (Vitamin D3) 2,000 unit PO QAM 10/23/17 09/21/18 History





[Vitamin D3]    


 


Nitroglycerin Sl Tabs [Nitrostat] 0.4 mg SUBLINGUAL Q5M PRN #25 tab 10/25/17 09/

21/18 Rx


 


Atorvastatin [Lipitor] 80 mg PO QAM 12/08/17 09/21/18 History


 


Metoprolol Tartrate [Lopressor] 25 mg PO QAM 12/08/17 09/21/18 History


 


Aspirin [Adult Low Dose Aspirin EC] 81 mg PO DAILY 08/31/18 09/24/18 History


 


Clopidogrel [Plavix] 75 mg PO DAILY 08/31/18 09/24/18 History











 Allergies











Allergy/AdvReac Type Severity Reaction Status Date / Time


 


No Known Allergies Allergy   Verified 09/21/18 14:51

## 2018-10-02 VITALS
DIASTOLIC BLOOD PRESSURE: 73 MMHG | RESPIRATION RATE: 12 BRPM | SYSTOLIC BLOOD PRESSURE: 134 MMHG | TEMPERATURE: 98.7 F | HEART RATE: 62 BPM

## 2018-10-02 RX ADMIN — FAMOTIDINE SCH MG: 10 INJECTION, SOLUTION INTRAVENOUS at 08:02

## 2018-10-02 RX ADMIN — DEXTROSE MONOHYDRATE, SODIUM CHLORIDE, AND POTASSIUM CHLORIDE SCH MLS/HR: 50; 4.5; 1.49 INJECTION, SOLUTION INTRAVENOUS at 12:43

## 2018-10-02 RX ADMIN — METOCLOPRAMIDE SCH MG: 5 INJECTION, SOLUTION INTRAMUSCULAR; INTRAVENOUS at 05:09

## 2018-10-02 RX ADMIN — DEXTROSE MONOHYDRATE, SODIUM CHLORIDE, AND POTASSIUM CHLORIDE SCH MLS/HR: 50; 4.5; 1.49 INJECTION, SOLUTION INTRAVENOUS at 05:08

## 2018-10-02 RX ADMIN — METOCLOPRAMIDE SCH MG: 5 INJECTION, SOLUTION INTRAMUSCULAR; INTRAVENOUS at 12:19

## 2018-10-02 RX ADMIN — HEPARIN SODIUM SCH UNIT: 5000 INJECTION, SOLUTION INTRAVENOUS; SUBCUTANEOUS at 08:01

## 2018-10-02 NOTE — P.DS
Providers


Date of admission: 


10/01/18 11:57





Expected date of discharge: 10/02/18


Attending physician: 


Eliz Desai





Primary care physician: 


[facility] Course: 





Pleasant 58-year-old male with a history of a large bowel obstruction due to 

diverticulitis.  Patient had a temporary descending colostomy done.  He 

presents now for reversal.  October 1 patient underwent   Robotic-assisted 

daVinci Xi laparoscopic descending colostomy reversal, multi-port


  Application of PREVENA 13-cm wound vac at left lower quadrant


On the morning of discharge patient was up ambulatory on the unit passing gas 

no stool tolerating diet no nausea no vomiting abdominal binder in place states 

pain medication is effective for pain abdomen soft not distended urinating no 

difficulty control white count 5.2 hemoglobin 14.7 electrolyte within normal 

limits AST and ALT not elevated the temp is 97.6











Impression discharge diagnose


History of a large bowel obstruction due to sigmoid diverticulitis


History of sigmoid volvulus


Coronary artery disease with coronary stenting


Chronic iron efficiency anemia


Descending colostomy status


Robotic-assisted daVinci Xi laparoscopic descending colostomy reversal, multi-

port done on October 1


  Application of PREVENA 13-cm wound vac at left lower quadrant placed on 

October 1








The above impression and plan of care have been discussed and directed by 

signing physician. Marlee Simon nurse practitioner acting as scribe for signing 

physician.





Plan - Discharge Summary


Discharge Rx Participant: Yes


New Discharge Prescriptions: 


New


   HYDROcodone/APAP 7.5-325MG [Norco 7.5-325] 1 tab PO Q4H PRN 3 Days #18 tab


     PRN Reason: Pain





Continue


   Nitroglycerin Sl Tabs [Nitrostat] 0.4 mg SUBLINGUAL Q5M PRN #25 tab


     PRN Reason: Chest Pain


   Metoprolol Tartrate [Lopressor] 25 mg PO QAM


   Atorvastatin [Lipitor] 80 mg PO QAM


   Aspirin [Adult Low Dose Aspirin EC] 81 mg PO DAILY





Discontinued


   Cholecalciferol (Vitamin D3) [Vitamin D3] 2,000 unit PO QAM


   Clopidogrel [Plavix] 75 mg PO DAILY


Discharge Medication List





Nitroglycerin Sl Tabs [Nitrostat] 0.4 mg SUBLINGUAL Q5M PRN #25 tab 10/25/17 [Rx

]


Atorvastatin [Lipitor] 80 mg PO QAM 12/08/17 [History]


Metoprolol Tartrate [Lopressor] 25 mg PO QAM 12/08/17 [History]


Aspirin [Adult Low Dose Aspirin EC] 81 mg PO DAILY 08/31/18 [History]


HYDROcodone/APAP 7.5-325MG [Norco 7.5-325] 1 tab PO Q4H PRN 3 Days #18 tab 10/02

/18 [Rx]








Follow up Appointment(s)/Referral(s): 


Eliz Desai MD [STAFF PHYSICIAN] - 10/09/18 2:00 pm


Patient Instructions/Handouts:  Colectomy (DC), Negative Pressure Wound Therapy 

(DC), Colectomy Diet (DC)


Activity/Diet/Wound Care/Special Instructions: 


Full liquid diet until seen by the surgeon. Notify your surgeon for any issues 

of your wound VAC. NO bathtub soaks. Sponge bath only. NO lifting over 4 pounds 

in 4 weeks.


Discharge Disposition: HOME SELF-CARE

## 2018-10-04 NOTE — P.PN
Progress Note - Text


Progress Note Date: 10/04/18





Patient contacted at home.  Wife describes that he had a bowel movement.  He is 

tolerating diet.  No reports of fevers or chills.  No reports of infections.  

Patient will follow-up in the office within 3 days.

## 2019-02-01 ENCOUNTER — HOSPITAL ENCOUNTER (OUTPATIENT)
Dept: HOSPITAL 47 - LABPAT | Age: 59
Discharge: HOME | End: 2019-02-01
Attending: ANESTHESIOLOGY
Payer: COMMERCIAL

## 2019-02-01 DIAGNOSIS — Z01.812: Primary | ICD-10-CM

## 2019-02-01 LAB
ERYTHROCYTE [DISTWIDTH] IN BLOOD BY AUTOMATED COUNT: 4.51 M/UL (ref 4.3–5.9)
ERYTHROCYTE [DISTWIDTH] IN BLOOD: 13.2 % (ref 11.5–15.5)
HCT VFR BLD AUTO: 43.7 % (ref 39–53)
HGB BLD-MCNC: 13.8 GM/DL (ref 13–17.5)
MCH RBC QN AUTO: 30.7 PG (ref 25–35)
MCHC RBC AUTO-ENTMCNC: 31.7 G/DL (ref 31–37)
MCV RBC AUTO: 96.9 FL (ref 80–100)
PLATELET # BLD AUTO: 357 K/UL (ref 150–450)
WBC # BLD AUTO: 6.1 K/UL (ref 3.8–10.6)

## 2019-02-01 PROCEDURE — 36415 COLL VENOUS BLD VENIPUNCTURE: CPT

## 2019-02-01 PROCEDURE — 85027 COMPLETE CBC AUTOMATED: CPT

## 2019-02-04 ENCOUNTER — HOSPITAL ENCOUNTER (OUTPATIENT)
Dept: HOSPITAL 47 - OR | Age: 59
Discharge: HOME | End: 2019-02-04
Payer: COMMERCIAL

## 2019-02-04 VITALS — DIASTOLIC BLOOD PRESSURE: 79 MMHG | SYSTOLIC BLOOD PRESSURE: 150 MMHG | HEART RATE: 79 BPM

## 2019-02-04 VITALS — RESPIRATION RATE: 20 BRPM | TEMPERATURE: 97.2 F

## 2019-02-04 VITALS — BODY MASS INDEX: 24.1 KG/M2

## 2019-02-04 DIAGNOSIS — I25.2: ICD-10-CM

## 2019-02-04 DIAGNOSIS — Z79.82: ICD-10-CM

## 2019-02-04 DIAGNOSIS — R63.5: ICD-10-CM

## 2019-02-04 DIAGNOSIS — Z87.891: ICD-10-CM

## 2019-02-04 DIAGNOSIS — K66.0: ICD-10-CM

## 2019-02-04 DIAGNOSIS — K43.0: Primary | ICD-10-CM

## 2019-02-04 DIAGNOSIS — Z87.19: ICD-10-CM

## 2019-02-04 DIAGNOSIS — Z85.038: ICD-10-CM

## 2019-02-04 DIAGNOSIS — Z90.49: ICD-10-CM

## 2019-02-04 DIAGNOSIS — Z79.899: ICD-10-CM

## 2019-02-04 DIAGNOSIS — Z95.5: ICD-10-CM

## 2019-02-04 DIAGNOSIS — I08.3: ICD-10-CM

## 2019-02-04 DIAGNOSIS — I11.9: ICD-10-CM

## 2019-02-04 DIAGNOSIS — Z79.02: ICD-10-CM

## 2019-02-04 DIAGNOSIS — I25.119: ICD-10-CM

## 2019-02-04 DIAGNOSIS — Z87.442: ICD-10-CM

## 2019-02-04 DIAGNOSIS — Z85.820: ICD-10-CM

## 2019-02-04 PROCEDURE — 49655: CPT

## 2019-02-04 PROCEDURE — 64488 TAP BLOCK BI INJECTION: CPT

## 2019-02-04 RX ADMIN — HYDROMORPHONE HYDROCHLORIDE PRN MG: 1 INJECTION, SOLUTION INTRAMUSCULAR; INTRAVENOUS; SUBCUTANEOUS at 13:54

## 2019-02-04 RX ADMIN — HYDROMORPHONE HYDROCHLORIDE PRN MG: 1 INJECTION, SOLUTION INTRAMUSCULAR; INTRAVENOUS; SUBCUTANEOUS at 13:45

## 2019-02-04 RX ADMIN — HYDROMORPHONE HYDROCHLORIDE PRN MG: 1 INJECTION, SOLUTION INTRAMUSCULAR; INTRAVENOUS; SUBCUTANEOUS at 13:32

## 2019-02-04 RX ADMIN — HYDROMORPHONE HYDROCHLORIDE PRN MG: 1 INJECTION, SOLUTION INTRAMUSCULAR; INTRAVENOUS; SUBCUTANEOUS at 13:26

## 2019-02-04 NOTE — P.OP
Date of Procedure: 02/04/19


Description of Procedure: 








SURGEON:  ELIZ DESAI MD


FIRST ASSISTANT: 


1. MEENA QUINTANILLA





PREOPERATIVE DIAGNOSES:  


1. Incisional ventral hernia


2. Previous history of colostomy reversal from large bowel obstruction due to 

sigmoid diverticulitis


3. Chronic antiplatelet therapy


4. History of myocardial infarction


5. Coronary artery disease with stent


6. Hypertensive heart disease


7. History of chronic iron anemia deficiency


8. History of recent moderate weight gain





POSTOPERATIVE DIAGNOSES:  


1. Multiple incisional ventral hernias with incarceration involving omentum 

without obstruction, 11 x 8 cm


2. Previous history of colostomy reversal from large bowel obstruction due to 

sigmoid diverticulitis


3. Chronic antiplatelet therapy


4. History of myocardial infarction


5. Coronary artery disease with stent


6. Hypertensive heart disease


7. History of chronic iron anemia deficiency


8. History of recent moderate weight gain





OPERATION:       


1.  Robotic-assisted da Lucien Xi laparoscopic repair of initial incarcerated 

incisional ventral hernia 11 x 8 cm with mesh, ventralight ST mesh  15 x 20 cm


2.  Robotic-assisted da Lucien Xi laparoscopic extensive lysis of adhesions





ANESTHESIA: General with local


ESTIMATED BLOOD LOSS:  5 mL.


SPECIMENS: None


COMPLICATIONS:  None. 





INDICATIONS: The patient is a 58-year-old male who presents incisional hernia 

following rapid weight gain and recent colostomy reversal 6 months ago. 

Surgical intervention with laparoscopic versus robotic and open techniques were 

reviewed. 


Placement of mesh was also reviewed. Benefits and risks were thoroughly 

described. Informed consent was obtained.  





DESCRIPTION OF PROCEDURE: The patient was brought into the operating 


room and laid in supine position. After general induction, the abdomen 


had been prepped and draped in standard sterile fashion. Ioban draping 


was also placed. Prior to incision, a timeout protocol was confirmed 


with surgical team regarding the patient's name including procedures to be 


performed. The robot was primed prior to the procedure.  





A field block using local anesthetic was placed along hernia site including the 

proposed port sites.





Initial incision was made with an  #11 blade along the left upper quadrant. A 0 

degree 5 mm laparoscopic trocar 


entry was performed and insufflated.  Diagnostic laparoscopy demonstrated 

severe midline adhesions occluding of the left lower quadrant from recent 

surgery.  The omentum was incarcerated along the midline consistent with an 

incarcerated incisional hernia at the umbilicus and along his previous ostomy 

site.





Two 8 mm ports were placed along the left lateral abdominal wall under direct 

localization. The 5-mm port was exchanged for an 12 mm robotic port.  

Placements of the ports were 15 cm from the target anatomy and 10 cm apart.  





The da Lucien Xi robot was previously primed, prepped and draped then docked 

along the left side of the patient.





I then sat at the robot Da Lucien Xi console where working arms of the robot 

including Bovie cautery connected to robotic scissors, vessel sealer, needle 

, and graspers placed by the assistant.





Extensive lysis of adhesions occurred well over half an hour using vessel 

sealer as well as scissors to address omental adhesions to the abdominal wall.  

No small bowel or large bowel adhesions were found of the abdominal wall.  Once 

cleaned, multiple fascial defects were identified including a 2 cm defect of 

the left lower abdomen from previous ostomy site.  An incarcerated umbilical 

incisional hernia over 5 cm found.  Additional swiss cheese defect of the lower 

midline 1 to 2 cm in size were found.  The entire defects combined with 11 cm 

length with 8 cm width. A ventralight ST mesh of 20 cm length and 15 cm width 

was selected. 





Fascial defect of 5 cm of the umbilicus was identified after cleaning the 

peritoneal fat of the abdominal wall and reducing an incarcerated omentum.  The 

umbilical defect was oversewn using 3 fascial imbrication of #1 Stratafix to 

reduce the size of the defect. Closure was performed under adjusted pressure of 

10 mmHg.  





A 12 mm port was placed along the left upper quadrant for placement of the mesh 

and for sutures.





Next, ventralight ST mesh 20 x 15 cm was placed with the rough side towards the 

abdominal wall. 2-0 VLOC 12 inch sutures were used to fixate the mesh. 





A final endoscopic imaging was obtained. 





All instruments and pneumoperitoneum were evacuated from the abdominal cavity.  





The da Lucien Xi robot was undocked from the patient. I re-scrubbed into the 

case for closure of incisions.  





The fascia of the 12-mm port was probed and less than 8-mm in size. The 

incisions were reapproximated using 4-0 Monocryl in an  interrupted 

subcuticular fashion. Liquid glue was applied to the skin after cleansing the 

skin with normal saline and dilute hydrogen peroxide.





An abdominal binder was placed. An umbilical dressing was placed prior.





At the end of the procedure, needle, sponge, and instrument count had 


been verified correct by surgical technician. The patient was taken to 


the postanesthesia care unit in stable condition.





FINDINGS: 


1.  Multiple fascial defects involving the midline of total 11 x 8 cm


2.  Incarcerated incisional hernia reduced


3.  Severe omental adhesions reduced and lysed 


4.  Console time 68 minutes





Plan - Discharge Summary


Discharge Rx Participant: No


New Discharge Prescriptions: 


New


   HYDROcodone/APAP 7.5-325MG [Norco 7.5-325] 1 tab PO Q4H PRN 3 Days #18 tab


     PRN Reason: Pain





No Action


   Nitroglycerin Sl Tabs [Nitrostat] 0.4 mg SUBLINGUAL Q5M PRN #25 tab


     PRN Reason: Chest Pain


   Metoprolol Tartrate [Lopressor] 25 mg PO QAM


   Atorvastatin [Lipitor] 80 mg PO QAM


   Aspirin [Adult Low Dose Aspirin EC] 81 mg PO DAILY


   Clopidogrel [Plavix] 75 mg PO DAILY


   Cholecalciferol (Vitamin D3) [Vitamin D3] 2,000 unit PO DAILY


Discharge Medication List





Nitroglycerin Sl Tabs [Nitrostat] 0.4 mg SUBLINGUAL Q5M PRN #25 tab 10/25/17 [Rx

]


Atorvastatin [Lipitor] 80 mg PO QAM 12/08/17 [History]


Metoprolol Tartrate [Lopressor] 25 mg PO QAM 12/08/17 [History]


Aspirin [Adult Low Dose Aspirin EC] 81 mg PO DAILY 08/31/18 [History]


Cholecalciferol (Vitamin D3) [Vitamin D3] 2,000 unit PO DAILY 01/31/19 [History]


Clopidogrel [Plavix] 75 mg PO DAILY 01/31/19 [History]


HYDROcodone/APAP 7.5-325MG [Norco 7.5-325] 1 tab PO Q4H PRN 3 Days #18 tab 02/04 /19 [Rx]








Follow up Appointment(s)/Referral(s): 


Eliz Desai MD [STAFF PHYSICIAN] - 02/12/19


Patient Instructions/Handouts:  Lysis of Abdominal Adhesions (DC), Abdominal 

Binder (DC), Incisional Hernia (DC), Ventral Hernia Repair (DC)


Activity/Diet/Wound Care/Special Instructions: 


No lifting over 4 pounds in 4 weeks.  May shower. No bathtub soaks for 1 week. 

KEEP DRESSING ON. Wear abdominal binder at all times except for showering


Discharge Disposition: HOME SELF-CARE

## 2019-02-04 NOTE — P.ONQ
Anesthesiology Proc Note - PNB





- Peripheral Nerve Block Performed


  ** Bilateral Transversus Abdominis Single


Time Out Performed: Yes


Procedure Start Time: 10:35


Procedure Stop Time: 10:40


Indication: Acute Post-Operative Pain, Analgesia, Requested by physician


Sedation Type: Sedate with meaningful contact maintained


Preparation: Sterile Prep


Position: Supine


Catheter: None


Needle Types: On-Q


Needle Size: 100mm (4")


Needle Gauge: 21


Technique: Ultrasound


Injectate: 0.5% Ropivacaine (see comment for volume) (0.375% 20cc each side)


Blood Aspirated: No


Pain Paresthesia on Injection Noted: No


Resistance on Injection: Normal


Events: Uneventful and Well Tolerated

## 2019-02-04 NOTE — P.GSHP
History of Present Illness


H&P Date: 02/04/19











CHIEF COMPLAINT: Incisional hernia





HISTORY OF PRESENT ILLNESS: The patient is a 58-year-old male who


presents with a history of swelling and pain along the abdomen from a hernia.  


Now he presents for surgical intervention.





PAST MEDICAL HISTORY: 


Please see list.





PAST SURGICAL HISTORY: 


Please see list.





MEDICATIONS: 


Please see list.





ALLERGIES:  Please see list. 





SOCIAL HISTORY: No illicit drug use





FAMILY HISTORY: No reports of Crohn disease or ulcerative colitis. 





REVIEW OF ORGAN SYSTEMS: 


CONSTITUTIONAL: No reports of fevers or chills. No reports of weight


loss despite prior attempts. 


GI:  Denies any blood in stools or constipation. 





PHYSICAL EXAM: 


VITAL SIGNS:  Stable


GENERAL: Well-developed pleasant male in no acute distress. 


HEENT: No scleral icterus. Extraocular movements grossly


intact. Moist buccal mucosa. 


NECK: Supple without lymphadenopathy. 


CHEST: Unlabored respirations. Equal bilateral excursions. 


CARDIOVASCULAR: Regular rate and rhythm. Distal 2+ pulses. 


ABDOMEN: Soft, nondistended.  Palpable defect of the abdomen left lower 

quadrant.  No peritoneal signs.


MUSCULOSKELETAL: No clubbing, cyanosis, or edema. 





ASSESSMENT: 


1.  Incisional hernia





PLAN: 


1.  Recommend proceeding with robotic ventral hernia repair with mesh.


2.  Benefits and risks of surgical intervention was discussed including 

possibility of open technique.


3.  DVT prophylaxis.


4.  Antibiotic prophylaxis.





Past Medical History


Past Medical History: Coronary Artery Disease (CAD), Cancer, Chest Pain / Angina

, GI Bleed, Myocardial Infarction (MI)


Additional Past Medical History / Comment(s): 2/13/18 GI BLEED WITH TRANSFUSION.

, HX OF MELANOMA SKIN CANCER, KIDNEY STONE., HX OF DIVERTICULITIS WITH 

COLOSTOMY & REVERSAL., INCISIONAL HERNIA.


Last Myocardial Infarction Date:: 10/23/17


History of Any Multi-Drug Resistant Organisms: None Reported


Past Surgical History: Bowel Resection, Heart Catheterization With Stent, 

Tonsillectomy


Additional Past Surgical History / Comment(s): 2/2018 EGD/colonoscopy, skin 

cancer ., HEART CATH X2 WITH STENTS (LAST STENTS DEC 2017)., BOWEL RESECTION 

WITH COLOSTOMY FOR DIVERTICULITS AND REVERSAL OF COLOSTOMY.


Past Anesthesia/Blood Transfusion Reactions: No Reported Reaction


Additional Past Anesthesia/Blood Transfusion Reaction / Comment(s): .


Date of Last Stent Placement:: 12/2017


Past Psychological History: No Psychological Hx Reported


Additional Psychological History / Comment(s): .


Smoking Status: Former smoker


Past Alcohol Use History: Daily


Additional Past Alcohol Use History / Comment(s): quit smoking 10/2017, smoked 

for 20 yrs, Smoked 2 PPD.  DRINKS 6 BEERS DAILY.


Past Drug Use History: None Reported





- Past Family History


  ** Mother


Family Medical History: Cancer, CVA/TIA


Additional Family Medical History / Comment(s): Breast Cancer





  ** Father


Family Medical History: Cancer


Additional Family Medical History / Comment(s): pancreatic





Medications and Allergies


 Home Medications











 Medication  Instructions  Recorded  Confirmed  Type


 


Nitroglycerin Sl Tabs [Nitrostat] 0.4 mg SUBLINGUAL Q5M PRN #25 tab 10/25/17 01/

31/19 Rx


 


Atorvastatin [Lipitor] 80 mg PO QAM 12/08/17 01/31/19 History


 


Metoprolol Tartrate [Lopressor] 25 mg PO QAM 12/08/17 01/31/19 History


 


Aspirin [Adult Low Dose Aspirin EC] 81 mg PO DAILY 08/31/18 01/31/19 History


 


Cholecalciferol (Vitamin D3) 2,000 unit PO DAILY 01/31/19 01/31/19 History





[Vitamin D3]    


 


Clopidogrel [Plavix] 75 mg PO DAILY 01/31/19 01/31/19 History











 Allergies











Allergy/AdvReac Type Severity Reaction Status Date / Time


 


No Known Allergies Allergy   Verified 01/31/19 09:10

## 2019-07-31 ENCOUNTER — HOSPITAL ENCOUNTER (OUTPATIENT)
Dept: HOSPITAL 47 - LABWHC1 | Age: 59
Discharge: HOME | End: 2019-07-31
Payer: COMMERCIAL

## 2019-07-31 DIAGNOSIS — K74.1: ICD-10-CM

## 2019-07-31 DIAGNOSIS — N19: ICD-10-CM

## 2019-07-31 DIAGNOSIS — K50.90: ICD-10-CM

## 2019-07-31 DIAGNOSIS — K90.89: ICD-10-CM

## 2019-07-31 DIAGNOSIS — D50.8: ICD-10-CM

## 2019-07-31 DIAGNOSIS — E66.01: ICD-10-CM

## 2019-07-31 DIAGNOSIS — E21.1: Primary | ICD-10-CM

## 2019-07-31 DIAGNOSIS — E55.9: ICD-10-CM

## 2019-07-31 LAB
ALBUMIN SERPL-MCNC: 4 G/DL (ref 3.8–4.9)
ALBUMIN/GLOB SERPL: 1.48 G/DL (ref 1.6–3.17)
ALP SERPL-CCNC: 75 U/L (ref 41–126)
ALT SERPL-CCNC: 95 U/L (ref 10–49)
ANION GAP SERPL CALC-SCNC: 10.6 MMOL/L (ref 4–12)
APTT BLD: 22.7 SEC (ref 22–30)
AST SERPL-CCNC: 112 U/L (ref 14–35)
BUN SERPL-SCNC: 9 MG/DL (ref 9–27)
BUN/CREAT SERPL: 10 RATIO (ref 12–20)
CALCIUM SPEC-MCNC: 9.1 MG/DL (ref 8.7–10.3)
CHLORIDE SERPL-SCNC: 106 MMOL/L (ref 96–109)
CHOLEST SERPL-MCNC: 153 MG/DL (ref 0–200)
CO2 SERPL-SCNC: 24.4 MMOL/L (ref 21.6–31.8)
ERYTHROCYTE [DISTWIDTH] IN BLOOD BY AUTOMATED COUNT: 4.52 M/UL (ref 4.3–5.9)
ERYTHROCYTE [DISTWIDTH] IN BLOOD: 14.2 % (ref 11.5–15.5)
FERRITIN SERPL-MCNC: 95.2 NG/ML (ref 22–322)
GLOBULIN SER CALC-MCNC: 2.7 G/DL (ref 1.6–3.3)
GLUCOSE SERPL-MCNC: 91 MG/DL (ref 70–110)
HBA1C MFR BLD: 6.1 % (ref 4–6)
HCT VFR BLD AUTO: 43.9 % (ref 39–53)
HDLC SERPL-MCNC: 57 MG/DL (ref 40–60)
HGB BLD-MCNC: 14 GM/DL (ref 13–17.5)
INR PPP: 0.9 (ref ?–1.2)
IRON SERPL-MCNC: 154 UG/DL (ref 65–175)
LDLC SERPL CALC-MCNC: 52.8 MG/DL (ref 0–131)
MAGNESIUM SPEC-SCNC: 1.8 MG/DL (ref 1.5–2.4)
MCH RBC QN AUTO: 31 PG (ref 25–35)
MCHC RBC AUTO-ENTMCNC: 31.9 G/DL (ref 31–37)
MCV RBC AUTO: 97.2 FL (ref 80–100)
PLATELET # BLD AUTO: 345 K/UL (ref 150–450)
POTASSIUM SERPL-SCNC: 4.4 MMOL/L (ref 3.5–5.5)
PREALB SERPL-MCNC: 34 MG/DL (ref 18–42)
PROT SERPL-MCNC: 6.7 G/DL (ref 6.2–8.2)
PT BLD: 9.8 SEC (ref 9–12)
SODIUM SERPL-SCNC: 141 MMOL/L (ref 135–145)
TIBC SERPL-MCNC: 278 UG/DL (ref 228–460)
TRIGL SERPL-MCNC: 216 MG/DL (ref 0–149)
VIT B12 SERPL-MCNC: 572 PG/ML (ref 200–944)
VLDLC SERPL CALC-MCNC: 43.2 MG/DL (ref 5–40)
WBC # BLD AUTO: 5.7 K/UL (ref 3.8–10.6)

## 2019-07-31 PROCEDURE — 83540 ASSAY OF IRON: CPT

## 2019-07-31 PROCEDURE — 84590 ASSAY OF VITAMIN A: CPT

## 2019-07-31 PROCEDURE — 82306 VITAMIN D 25 HYDROXY: CPT

## 2019-07-31 PROCEDURE — 85610 PROTHROMBIN TIME: CPT

## 2019-07-31 PROCEDURE — 82607 VITAMIN B-12: CPT

## 2019-07-31 PROCEDURE — 84134 ASSAY OF PREALBUMIN: CPT

## 2019-07-31 PROCEDURE — 83036 HEMOGLOBIN GLYCOSYLATED A1C: CPT

## 2019-07-31 PROCEDURE — 36415 COLL VENOUS BLD VENIPUNCTURE: CPT

## 2019-07-31 PROCEDURE — 80061 LIPID PANEL: CPT

## 2019-07-31 PROCEDURE — 83550 IRON BINDING TEST: CPT

## 2019-07-31 PROCEDURE — 82525 ASSAY OF COPPER: CPT

## 2019-07-31 PROCEDURE — 84100 ASSAY OF PHOSPHORUS: CPT

## 2019-07-31 PROCEDURE — 85027 COMPLETE CBC AUTOMATED: CPT

## 2019-07-31 PROCEDURE — 84425 ASSAY OF VITAMIN B-1: CPT

## 2019-07-31 PROCEDURE — 85730 THROMBOPLASTIN TIME PARTIAL: CPT

## 2019-07-31 PROCEDURE — 84630 ASSAY OF ZINC: CPT

## 2019-07-31 PROCEDURE — 82746 ASSAY OF FOLIC ACID SERUM: CPT

## 2019-07-31 PROCEDURE — 83735 ASSAY OF MAGNESIUM: CPT

## 2019-07-31 PROCEDURE — 82728 ASSAY OF FERRITIN: CPT

## 2019-07-31 PROCEDURE — 80053 COMPREHEN METABOLIC PANEL: CPT

## 2019-07-31 PROCEDURE — 83970 ASSAY OF PARATHORMONE: CPT

## 2019-07-31 PROCEDURE — 84255 ASSAY OF SELENIUM: CPT

## 2019-07-31 PROCEDURE — 84443 ASSAY THYROID STIM HORMONE: CPT

## 2019-08-01 LAB — ZINC SERPL-MCNC: 82 UG/DL (ref 60–130)

## 2019-08-02 LAB — VIT B1 BLD-MCNC: 86 UG/L (ref 38–122)

## 2019-08-28 ENCOUNTER — HOSPITAL ENCOUNTER (OUTPATIENT)
Dept: HOSPITAL 47 - RADUSMAIN | Age: 59
Discharge: HOME | End: 2019-08-28
Payer: COMMERCIAL

## 2019-08-28 DIAGNOSIS — R16.0: ICD-10-CM

## 2019-08-28 DIAGNOSIS — K76.0: Primary | ICD-10-CM

## 2019-08-28 PROCEDURE — 76705 ECHO EXAM OF ABDOMEN: CPT

## 2019-08-28 NOTE — US
EXAMINATION TYPE: US liver

 

DATE OF EXAM: 8/28/2019

 

COMPARISON: NONE

 

CLINICAL HISTORY: R94.5 elevated liver enzymes. Elevated liver enzymes

 

EXAM MEASUREMENTS:

 

Liver Length:  16.7 cm   

Gallbladder Wall:  0.1 cm   

CBD:  0.4 cm

Right Kidney:  9.2 x 4.0 x 4.4 cm

 

 

 

Pancreas:  Obscured by bowel gas

Liver:  Increased attenuation  

Gallbladder:  No stones seen

**Evidence for sonographic Hamm's sign:  No

CBD:  wnl 

Right Kidney:  wnl 

 

 

 

IMPRESSION: 

1. Hepatomegaly with mild to moderate fatty infiltration.

## 2019-10-17 ENCOUNTER — HOSPITAL ENCOUNTER (OUTPATIENT)
Dept: HOSPITAL 47 - LABPAT | Age: 59
End: 2019-10-17
Payer: COMMERCIAL

## 2019-10-17 DIAGNOSIS — Z01.812: Primary | ICD-10-CM

## 2019-10-17 LAB
ERYTHROCYTE [DISTWIDTH] IN BLOOD BY AUTOMATED COUNT: 4.41 M/UL (ref 4.3–5.9)
ERYTHROCYTE [DISTWIDTH] IN BLOOD: 13.1 % (ref 11.5–15.5)
HCT VFR BLD AUTO: 43.1 % (ref 39–53)
HGB BLD-MCNC: 14.3 GM/DL (ref 13–17.5)
MCH RBC QN AUTO: 32.5 PG (ref 25–35)
MCHC RBC AUTO-ENTMCNC: 33.3 G/DL (ref 31–37)
MCV RBC AUTO: 97.7 FL (ref 80–100)
PLATELET # BLD AUTO: 364 K/UL (ref 150–450)
WBC # BLD AUTO: 9.1 K/UL (ref 3.8–10.6)

## 2019-10-17 PROCEDURE — 36415 COLL VENOUS BLD VENIPUNCTURE: CPT

## 2019-10-17 PROCEDURE — 85027 COMPLETE CBC AUTOMATED: CPT

## 2019-10-18 ENCOUNTER — HOSPITAL ENCOUNTER (OUTPATIENT)
Dept: HOSPITAL 47 - OR | Age: 59
LOS: 1 days | Discharge: HOME | End: 2019-10-19
Payer: COMMERCIAL

## 2019-10-18 VITALS — BODY MASS INDEX: 23.6 KG/M2

## 2019-10-18 DIAGNOSIS — Z87.891: ICD-10-CM

## 2019-10-18 DIAGNOSIS — Z85.820: ICD-10-CM

## 2019-10-18 DIAGNOSIS — K57.32: ICD-10-CM

## 2019-10-18 DIAGNOSIS — Z87.442: ICD-10-CM

## 2019-10-18 DIAGNOSIS — Z90.49: ICD-10-CM

## 2019-10-18 DIAGNOSIS — K43.6: Primary | ICD-10-CM

## 2019-10-18 DIAGNOSIS — I25.119: ICD-10-CM

## 2019-10-18 DIAGNOSIS — D50.0: ICD-10-CM

## 2019-10-18 DIAGNOSIS — I25.2: ICD-10-CM

## 2019-10-18 DIAGNOSIS — Z79.82: ICD-10-CM

## 2019-10-18 DIAGNOSIS — I11.9: ICD-10-CM

## 2019-10-18 DIAGNOSIS — Z79.899: ICD-10-CM

## 2019-10-18 DIAGNOSIS — Z80.3: ICD-10-CM

## 2019-10-18 DIAGNOSIS — Z79.02: ICD-10-CM

## 2019-10-18 DIAGNOSIS — R63.5: ICD-10-CM

## 2019-10-18 DIAGNOSIS — Z82.3: ICD-10-CM

## 2019-10-18 DIAGNOSIS — Z80.0: ICD-10-CM

## 2019-10-18 DIAGNOSIS — Z95.5: ICD-10-CM

## 2019-10-18 PROCEDURE — 64488 TAP BLOCK BI INJECTION: CPT

## 2019-10-18 PROCEDURE — 49653: CPT

## 2019-10-18 RX ADMIN — DOCUSATE SODIUM SCH MG: 100 CAPSULE, LIQUID FILLED ORAL at 21:31

## 2019-10-18 RX ADMIN — HYDROCODONE BITARTRATE AND ACETAMINOPHEN PRN EACH: 5; 325 TABLET ORAL at 19:23

## 2019-10-18 RX ADMIN — CEFAZOLIN SCH MLS/HR: 330 INJECTION, POWDER, FOR SOLUTION INTRAMUSCULAR; INTRAVENOUS at 17:28

## 2019-10-18 RX ADMIN — POTASSIUM CHLORIDE SCH MLS: 14.9 INJECTION, SOLUTION INTRAVENOUS at 09:26

## 2019-10-18 RX ADMIN — HYDROCODONE BITARTRATE AND ACETAMINOPHEN PRN EACH: 5; 325 TABLET ORAL at 23:42

## 2019-10-18 NOTE — P.OP
Date of Procedure: 10/18/19


Description of Procedure: 








Date of Procedure: 10/18/19





SURGEON:  NUNU JENKINS MD


FIRST ASSISTANT: 


1. MEENA QUINTANILLA





PREOPERATIVE DIAGNOSES:  


1. Incisional ventral hernia, epigastrium


2. Previous history of colostomy reversal from large bowel obstruction due to 

sigmoid diverticulitis


3. Chronic antiplatelet therapy


4. History of myocardial infarction


5. Coronary artery disease with stent


6. Hypertensive heart disease


7. History of chronic iron anemia deficiency


8. History of recent moderate weight gain





POSTOPERATIVE DIAGNOSES:  


1. Multiple incisional epigastric ventral hernias with incarceration involving 

omentum without obstruction, 10 x 12 cm


2. Previous history of colostomy reversal from large bowel obstruction due to 

sigmoid diverticulitis


3. Chronic antiplatelet therapy


4. History of myocardial infarction


5. Coronary artery disease with stent


6. Hypertensive heart disease


7. History of chronic iron anemia deficiency


8. History of recent moderate weight gain





OPERATION:       


1.  Robotic-assisted da Lucien Xi laparoscopic extensive lysis of adhesions, over

1.5 hours


2.  Robotic-assisted da Lucien Xi laparoscopic repair of initial epigastric 

incarcerated incisional ventral hernia 10 x 12 cm with mesh using ventralight ST

mesh  15 x 20 cm





ANESTHESIA: General with local


ESTIMATED BLOOD LOSS:  5 mL.


SPECIMENS: None


COMPLICATIONS:  None. 





INDICATIONS: The patient is a 59-year-old male who presents new epigastric 

incisional hernia following rapid weight gain and colostomy reversal  from 

obstructive diverticulitis. Surgical intervention with laparoscopic versus 

robotic and open techniques were reviewed. 


Placement of mesh was also reviewed. Benefits and risks were thoroughly 

described. Informed consent was obtained.  





DESCRIPTION OF PROCEDURE: The patient was brought into the operating 


room and laid in supine position. After general induction, the abdomen 


had been prepped and draped in standard sterile fashion. Ioban draping 


was also placed. Prior to incision, a timeout protocol was confirmed 


with surgical team regarding the patient's name including procedures to be 


performed. The robot was primed prior to the procedure.  





A field block using local anesthetic was placed along hernia site including the 

proposed port sites.





Initial incision was made with an  #11 blade along the left upper quadrant. A 0 

degree 5 mm laparoscopic trocar 


entry was performed and insufflated.  Diagnostic laparoscopy demonstrated severe

midline adhesions.  The  transverse colon was incarcerated along the 

epigastrium/upper midline consistent with an incarcerated incisional hernia.  No

recurrent incisional hernias were identified of the left lower quadrant or lower

abdomen from his previous repair.





Three 8 mm ports were placed along the right lateral abdominal wall under direct

localization. The 5-mm port was exchanged for an 12 mm robotic port at the left 

upper quadrant.   Placements of the ports were 15 cm from the target anatomy and

10 cm apart.  





The da Lucien Xi robot was previously primed, prepped and draped then docked 

along the right side of the patient.





I then sat at the robot Da Lucien Xi console where working arms of the robot 

including Bovie cautery connected to robotic scissors, vessel sealer, needle 

, and graspers placed by the assistant.





Extensive lysis of adhesions occurred well 1.5 hrs using vessel sealer as well 

as scissors to address omental adhesions to the abdominal wall and careful 

dissection as it involved the mid transverse colon to the abdominal wall.  Once 

cleaned, multiple fascial defects were identified along the upper midline 

consistent with Swiss cheese defect with incarcerated transverse colon without 

obstruction. The entire defects combined were 10 cm length with 12 cm width. A 

ventralight ST mesh of 20 cm with and 15 cm length was selected. 





Moderate diastases recti over 6 cm was identified undisturbed whereby a mesh 

onlay was proposed. Next, ventralight ST mesh 20 x 15 cm was placed with the 

rough side towards the abdominal wall. 2-0 VLOC 12 inch sutures were used to 

fixate the mesh. 





A final endoscopic imaging was obtained. 





All instruments and pneumoperitoneum were evacuated from the abdominal cavity.  





The da Lucien Xi robot was undocked from the patient. I re-scrubbed into the case

for closure of incisions.  





The fascia of the 12-mm port was probed and less than 8-mm in size. The 

incisions were reapproximated using 4-0 Monocryl in an  interrupted subcuticular

fashion. Liquid glue was applied to the skin after cleansing the skin with 

normal saline and dilute hydrogen peroxide.





An abdominal binder was placed. 





At the end of the procedure, needle, sponge, and instrument count had 


been verified correct by surgical technician. The patient was taken to 


the postanesthesia care unit in stable condition.





FINDINGS: 


1.  Multiple fascial defects involving the  epigastrium/upper midline of 10 x 12

cm


2.  Incarcerated incisional hernia involving mid transverse colon reduced 


3.  Severe omental adhesions reduced and lysed over 1.5 hours 


4.  No recurrent incisional hernias of the lower abdomen and previous ostomy 

site as previously repaired

## 2019-10-18 NOTE — P.GSHP
History of Present Illness


H&P Date: 10/18/19














CHIEF COMPLAINT:  Ventral hernia





HISTORY OF PRESENT ILLNESS: The patient is a 59-year-old male who


presents with a history of swelling and pain along the abdomen from a hernia.  


Now he presents for surgical intervention.





PAST MEDICAL HISTORY: 


Please see list.





PAST SURGICAL HISTORY: 


Please see list.





MEDICATIONS: 


Please see list.





ALLERGIES:  Please see list. 





SOCIAL HISTORY: No illicit drug use





FAMILY HISTORY: No reports of Crohn disease or ulcerative colitis. 





REVIEW OF ORGAN SYSTEMS: 


CONSTITUTIONAL: No reports of fevers or chills. No reports of weight


loss despite prior attempts. 


GI:  Denies any blood in stools or constipation. 





PHYSICAL EXAM: 


VITAL SIGNS:  Stable


GENERAL: Well-developed pleasant male in no acute distress. 


HEENT: No scleral icterus. Extraocular movements grossly


intact. Moist buccal mucosa. 


NECK: Supple without lymphadenopathy. 


CHEST: Unlabored respirations. Equal bilateral excursions. 


CARDIOVASCULAR: Regular rate and rhythm. Distal 2+ pulses. 


ABDOMEN: Soft, nondistended.  Palpable defect of the abdomen.  No peritoneal 

signs.


MUSCULOSKELETAL: No clubbing, cyanosis, or edema. 





ASSESSMENT: 


1.  Ventral hernia





PLAN: 


1.  Recommend proceeding with robotic ventral hernia repair with mesh.


2.  Benefits and risks of surgical intervention was discussed including 

possibility of open technique.


3.  DVT prophylaxis.


4.  Antibiotic prophylaxis.





Past Medical History


Past Medical History: Coronary Artery Disease (CAD), Cancer, Chest Pain / 

Angina, GI Bleed, Myocardial Infarction (MI)


Additional Past Medical History / Comment(s): 2/13/18 GI BLEED WITH 

TRANSFUSION., HX OF MELANOMA SKIN CANCER, KIDNEY STONE., HX OF DIVERTICULITIS 

WITH COLOSTOMY & REVERSAL., INCISIONAL HERNIA.


Last Myocardial Infarction Date:: 10/23/17


History of Any Multi-Drug Resistant Organisms: None Reported


Past Surgical History: Bowel Resection, Heart Catheterization With Stent, 

Tonsillectomy


Additional Past Surgical History / Comment(s): 2/2018 EGD/colonoscopy, skin 

cancer ., HEART CATH X2 WITH STENTS (LAST STENTS DEC 2017)., BOWEL RESECTION 

WITH COLOSTOMY FOR DIVERTICULITS AND REVERSAL OF COLOSTOMY.


Past Anesthesia/Blood Transfusion Reactions: No Reported Reaction


Additional Past Anesthesia/Blood Transfusion Reaction / Comment(s): .


Date of Last Stent Placement:: 12/2017


Smoking Status: Former smoker





- Past Family History


  ** Mother


Family Medical History: Cancer, CVA/TIA


Additional Family Medical History / Comment(s): Breast Cancer





  ** Father


Family Medical History: Cancer


Additional Family Medical History / Comment(s): pancreatic





Medications and Allergies


                                Home Medications











 Medication  Instructions  Recorded  Confirmed  Type


 


Nitroglycerin Sl Tabs [Nitrostat] 0.4 mg SUBLINGUAL Q5M PRN #25 tab 10/25/17 

10/18/19 Rx


 


Atorvastatin [Lipitor] 80 mg PO QAM 12/08/17 10/18/19 History


 


Metoprolol Tartrate [Lopressor] 25 mg PO QAM 12/08/17 10/18/19 History


 


Aspirin [Adult Low Dose Aspirin EC] 81 mg PO DAILY 08/31/18 10/18/19 History


 


Cholecalciferol (Vitamin D3) 5,000 unit PO DAILY 01/31/19 10/18/19 History





[Vitamin D3]    


 


Clopidogrel [Plavix] 75 mg PO DAILY 01/31/19 10/18/19 History








                                    Allergies











Allergy/AdvReac Type Severity Reaction Status Date / Time


 


No Known Allergies Allergy   Verified 10/18/19 09:01

## 2019-10-19 VITALS
DIASTOLIC BLOOD PRESSURE: 83 MMHG | SYSTOLIC BLOOD PRESSURE: 143 MMHG | RESPIRATION RATE: 16 BRPM | TEMPERATURE: 98.1 F | HEART RATE: 72 BPM

## 2019-10-19 RX ADMIN — POTASSIUM CHLORIDE SCH: 14.9 INJECTION, SOLUTION INTRAVENOUS at 03:38

## 2019-10-19 RX ADMIN — DOCUSATE SODIUM SCH MG: 100 CAPSULE, LIQUID FILLED ORAL at 08:51

## 2019-10-19 RX ADMIN — CEFAZOLIN SCH MLS/HR: 330 INJECTION, POWDER, FOR SOLUTION INTRAMUSCULAR; INTRAVENOUS at 04:11

## 2019-10-19 RX ADMIN — HYDROCODONE BITARTRATE AND ACETAMINOPHEN PRN EACH: 5; 325 TABLET ORAL at 05:58

## 2019-10-19 NOTE — P.PN
Progress Note - Text


Progress Note Date: 10/19/19





The patient is resting comfortably in bed.  He has been and bleeding.





On exam his vital signs are stable.  His abdomen soft.  Incision site is clean 

dry tach.





Patient will be discharged home today.  He'll follow-up in one week with Dr. Flores.